# Patient Record
Sex: MALE | Race: ASIAN | NOT HISPANIC OR LATINO | Employment: UNEMPLOYED | URBAN - METROPOLITAN AREA
[De-identification: names, ages, dates, MRNs, and addresses within clinical notes are randomized per-mention and may not be internally consistent; named-entity substitution may affect disease eponyms.]

---

## 2018-08-30 LAB
CREAT SERPL-MCNC: 0.86 MG/DL (ref 0.7–1.3)
GFR SERPL CREATININE-BSD FRML MDRD: >60 ML/MIN
GLUCOSE SERPL-MCNC: 93 MG/DL (ref 74–106)
HBA1C MFR BLD: 6.3 % (ref 0–5.7)
POTASSIUM SERPL-SCNC: 4 MMOL/L (ref 3.5–5.1)

## 2018-09-06 ENCOUNTER — TRANSFERRED RECORDS (OUTPATIENT)
Dept: HEALTH INFORMATION MANAGEMENT | Facility: CLINIC | Age: 63
End: 2018-09-06

## 2019-07-03 ENCOUNTER — OFFICE VISIT (OUTPATIENT)
Dept: FAMILY MEDICINE | Facility: CLINIC | Age: 64
End: 2019-07-03
Payer: COMMERCIAL

## 2019-07-03 VITALS
SYSTOLIC BLOOD PRESSURE: 111 MMHG | TEMPERATURE: 97.3 F | BODY MASS INDEX: 30.37 KG/M2 | WEIGHT: 193.5 LBS | OXYGEN SATURATION: 97 % | HEART RATE: 79 BPM | DIASTOLIC BLOOD PRESSURE: 66 MMHG | HEIGHT: 67 IN

## 2019-07-03 DIAGNOSIS — M25.562 CHRONIC PAIN OF LEFT KNEE: Primary | ICD-10-CM

## 2019-07-03 DIAGNOSIS — I10 HYPERTENSION, UNSPECIFIED TYPE: ICD-10-CM

## 2019-07-03 DIAGNOSIS — E11.9 TYPE 2 DIABETES MELLITUS WITHOUT COMPLICATION, WITHOUT LONG-TERM CURRENT USE OF INSULIN (H): ICD-10-CM

## 2019-07-03 DIAGNOSIS — N40.1 BENIGN PROSTATIC HYPERPLASIA WITH URINARY OBSTRUCTION: ICD-10-CM

## 2019-07-03 DIAGNOSIS — R07.89 ATYPICAL CHEST PAIN: ICD-10-CM

## 2019-07-03 DIAGNOSIS — N13.8 BENIGN PROSTATIC HYPERPLASIA WITH URINARY OBSTRUCTION: ICD-10-CM

## 2019-07-03 DIAGNOSIS — G89.29 CHRONIC PAIN OF LEFT KNEE: Primary | ICD-10-CM

## 2019-07-03 DIAGNOSIS — E78.5 DYSLIPIDEMIA: ICD-10-CM

## 2019-07-03 DIAGNOSIS — Z12.11 SPECIAL SCREENING FOR MALIGNANT NEOPLASMS, COLON: ICD-10-CM

## 2019-07-03 PROBLEM — M25.572 CHRONIC PAIN OF LEFT ANKLE: Status: ACTIVE | Noted: 2019-07-03

## 2019-07-03 LAB
ALBUMIN SERPL-MCNC: 3.6 G/DL (ref 3.4–5)
ALP SERPL-CCNC: 59 U/L (ref 40–150)
ALT SERPL W P-5'-P-CCNC: 30 U/L (ref 0–70)
ANION GAP SERPL CALCULATED.3IONS-SCNC: 8 MMOL/L (ref 3–14)
AST SERPL W P-5'-P-CCNC: 22 U/L (ref 0–45)
BASOPHILS # BLD AUTO: 0 10E9/L (ref 0–0.2)
BASOPHILS NFR BLD AUTO: 0.5 %
BILIRUB SERPL-MCNC: 0.4 MG/DL (ref 0.2–1.3)
BUN SERPL-MCNC: 15 MG/DL (ref 7–30)
CALCIUM SERPL-MCNC: 8.8 MG/DL (ref 8.5–10.1)
CHLORIDE SERPL-SCNC: 108 MMOL/L (ref 94–109)
CHOLEST SERPL-MCNC: 158 MG/DL
CO2 SERPL-SCNC: 24 MMOL/L (ref 20–32)
CREAT SERPL-MCNC: 0.93 MG/DL (ref 0.66–1.25)
DIFFERENTIAL METHOD BLD: NORMAL
EOSINOPHIL # BLD AUTO: 0.2 10E9/L (ref 0–0.7)
EOSINOPHIL NFR BLD AUTO: 3.5 %
ERYTHROCYTE [DISTWIDTH] IN BLOOD BY AUTOMATED COUNT: 13.7 % (ref 10–15)
GFR SERPL CREATININE-BSD FRML MDRD: 87 ML/MIN/{1.73_M2}
GLUCOSE SERPL-MCNC: 116 MG/DL (ref 70–99)
HBA1C MFR BLD: 6.1 % (ref 0–5.6)
HCT VFR BLD AUTO: 40.1 % (ref 40–53)
HDLC SERPL-MCNC: 61 MG/DL
HGB BLD-MCNC: 13.4 G/DL (ref 13.3–17.7)
LDLC SERPL CALC-MCNC: 82 MG/DL
LYMPHOCYTES # BLD AUTO: 2.5 10E9/L (ref 0.8–5.3)
LYMPHOCYTES NFR BLD AUTO: 37.5 %
MCH RBC QN AUTO: 29.1 PG (ref 26.5–33)
MCHC RBC AUTO-ENTMCNC: 33.4 G/DL (ref 31.5–36.5)
MCV RBC AUTO: 87 FL (ref 78–100)
MONOCYTES # BLD AUTO: 0.6 10E9/L (ref 0–1.3)
MONOCYTES NFR BLD AUTO: 9.3 %
NEUTROPHILS # BLD AUTO: 3.3 10E9/L (ref 1.6–8.3)
NEUTROPHILS NFR BLD AUTO: 49.2 %
NONHDLC SERPL-MCNC: 97 MG/DL
PLATELET # BLD AUTO: 196 10E9/L (ref 150–450)
POTASSIUM SERPL-SCNC: 3.7 MMOL/L (ref 3.4–5.3)
PROT SERPL-MCNC: 7.3 G/DL (ref 6.8–8.8)
RBC # BLD AUTO: 4.6 10E12/L (ref 4.4–5.9)
SODIUM SERPL-SCNC: 140 MMOL/L (ref 133–144)
TRIGL SERPL-MCNC: 75 MG/DL
WBC # BLD AUTO: 6.6 10E9/L (ref 4–11)

## 2019-07-03 PROCEDURE — 99386 PREV VISIT NEW AGE 40-64: CPT | Performed by: INTERNAL MEDICINE

## 2019-07-03 PROCEDURE — 80053 COMPREHEN METABOLIC PANEL: CPT | Performed by: INTERNAL MEDICINE

## 2019-07-03 PROCEDURE — 85025 COMPLETE CBC W/AUTO DIFF WBC: CPT | Performed by: INTERNAL MEDICINE

## 2019-07-03 PROCEDURE — 83036 HEMOGLOBIN GLYCOSYLATED A1C: CPT | Performed by: INTERNAL MEDICINE

## 2019-07-03 PROCEDURE — 84154 ASSAY OF PSA FREE: CPT | Mod: 90 | Performed by: INTERNAL MEDICINE

## 2019-07-03 PROCEDURE — 93000 ELECTROCARDIOGRAM COMPLETE: CPT | Performed by: INTERNAL MEDICINE

## 2019-07-03 PROCEDURE — 99214 OFFICE O/P EST MOD 30 MIN: CPT | Mod: 25 | Performed by: INTERNAL MEDICINE

## 2019-07-03 PROCEDURE — 80061 LIPID PANEL: CPT | Performed by: INTERNAL MEDICINE

## 2019-07-03 PROCEDURE — 84153 ASSAY OF PSA TOTAL: CPT | Mod: 90 | Performed by: INTERNAL MEDICINE

## 2019-07-03 PROCEDURE — 36415 COLL VENOUS BLD VENIPUNCTURE: CPT | Performed by: INTERNAL MEDICINE

## 2019-07-03 PROCEDURE — 99000 SPECIMEN HANDLING OFFICE-LAB: CPT | Performed by: INTERNAL MEDICINE

## 2019-07-03 RX ORDER — GLIMEPIRIDE 1 MG/1
1 TABLET ORAL
COMMUNITY
End: 2021-04-23

## 2019-07-03 RX ORDER — ROSUVASTATIN CALCIUM 20 MG/1
20 TABLET, COATED ORAL DAILY
Qty: 90 TABLET | Refills: 0 | Status: SHIPPED | OUTPATIENT
Start: 2019-07-03 | End: 2019-07-05

## 2019-07-03 RX ORDER — LISINOPRIL 10 MG/1
10 TABLET ORAL DAILY
COMMUNITY
End: 2019-08-30

## 2019-07-03 RX ORDER — TAMSULOSIN HYDROCHLORIDE 0.4 MG/1
0.4 CAPSULE ORAL
COMMUNITY
End: 2021-04-23

## 2019-07-03 SDOH — HEALTH STABILITY: MENTAL HEALTH: HOW OFTEN DO YOU HAVE A DRINK CONTAINING ALCOHOL?: 2-3 TIMES A WEEK

## 2019-07-03 ASSESSMENT — MIFFLIN-ST. JEOR: SCORE: 1631.34

## 2019-07-03 NOTE — PROGRESS NOTES
SUBJECTIVE:   CC: Jordan Miller is an 64 year old male who presents for preventative health visit.     Healthy Habits:    Getting at least 3 servings of Calcium per day:  Yes    Bi-annual eye exam:  Yes    Dental care twice a year:  Yes    Sleep apnea or symptoms of sleep apnea:  None    Diet:  Regular (no restrictions)    Frequency of exercise:  6-7 days/week    Duration of exercise:  Greater than 60 minutes    Taking medications regularly:  Yes    Barriers to taking medications:  None    Medication side effects:  None    PHQ-2 Total Score:    Additional concerns today:  Yes    Patient presenting for a physical and discuss chronic multiple medical problems.  Patient is  physically very active he runs 20 min and swims 6 times for 30 minutes a week.  He describes occasional chest pains upper chest area  between the left upper and right upper chest last for a few minutes at a time can happen with exercise or rest.  He is known to have diabetes seems to be well controlled.  He had an eye exam last seen in 2018.  Which he also complains of some minor knee pain left more than right and left ankle pain  is chronic and intermittent as well he had a colonoscopy 9 years ago in, [place] denies any GI symptoms he is due for repeat.  He has weak void stream, he has been  taking Flomax he still has obstructive lower urinary tract symptoms; intermittency and hesitancy.  He wears glasses ; bifocals.  Denies any sinus issues or allergies.  Denies any shortness of breath.  Denies any back pain or other complaints.  He had a Td vaccine 2 Months Ago he stepped on a nail in [place] at 89,.  His dad is alive 100 years of age and his mom  of old . he has 3 sisters and 3 brothers one sister has diabetes and 2 brothers have diabetes and he was  being seen in [facility] he      Today's PHQ-2 Score:   PHQ-2 (  [employer]) 7/3/2019   Q1: Little interest or pleasure in doing things 0   Q2:  Feeling down, depressed or hopeless 0   PHQ-2 Score 0       Abuse: Current or Past(Physical, Sexual or Emotional)- No  Do you feel safe in your environment? Yes    Social History     Tobacco Use     Smoking status: Never Smoker     Smokeless tobacco: Never Used   Substance Use Topics     Alcohol use: Yes     Frequency: 2-3 times a week     Comment: 2-3 drinks a week hard liquor     If you drink alcohol do you typically have >3 drinks per day or >7 drinks per week? No    Alcohol Use 7/3/2019   Prescreen: >3 drinks/day or >7 drinks/week? No     Last PSA: No results found for: PSA    Reviewed orders with patient. Reviewed health maintenance and updated orders accordingly - Yes  Lab work is in process  Labs reviewed in EPIC  BP Readings from Last 3 Encounters:   07/03/19 111/66   07/03/13 140/70    Wt Readings from Last 3 Encounters:   07/03/19 87.8 kg (193 lb 8 oz)   07/03/13 90.3 kg (199 lb)                  Patient Active Problem List   Diagnosis     Chronic pain of left ankle     Left knee pain     Atypical chest pain     Type 2 diabetes mellitus without complication, without long-term current use of insulin (H)     Hypertension, unspecified type     Benign prostatic hyperplasia with urinary obstruction     Dyslipidemia     Past Surgical History:   Procedure Laterality Date     CHOLECYSTECTOMY      1994     HERNIA REPAIR, UMBILICAL      ?2013       Social History     Tobacco Use     Smoking status: Never Smoker     Smokeless tobacco: Never Used   Substance Use Topics     Alcohol use: Yes     Frequency: 2-3 times a week     Comment: 2-3 drinks a week hard liquor     History reviewed. No pertinent family history.      Current Outpatient Medications   Medication Sig Dispense Refill     ASPIRIN 81 PO        atenolol (TENORMIN) 25 MG tablet Take 25 mg by mouth daily.       Cholecalciferol (VITAMIN D PO) Take 5,000 Units by mouth       glimepiride (AMARYL) 1 MG tablet Take 1 mg by mouth every morning (before breakfast)        lisinopril (PRINIVIL/ZESTRIL) 10 MG tablet Take 10 mg by mouth daily       metFORMIN (GLUCOPHAGE) 1000 MG tablet Take 1,000 mg by mouth 2 times daily (with meals).       rosuvastatin (CRESTOR) 20 MG tablet Take 1 tablet (20 mg) by mouth daily 90 tablet 0     tamsulosin (FLOMAX) 0.4 MG capsule Take 0.4 mg by mouth daily       Allergies   Allergen Reactions     No Known Allergies      Recent Labs   Lab Test 07/03/19  0938   A1C 6.1*   LDL 82   HDL 61   TRIG 75   ALT 30   CR 0.93   GFRESTIMATED 87   GFRESTBLACK >90   POTASSIUM 3.7        Reviewed and updated as needed this visit by clinical staff  Tobacco  Allergies  Meds  Problems  Med Hx  Surg Hx  Fam Hx  Soc Hx          Reviewed and updated as needed this visit by Provider  Tobacco  Allergies  Meds  Problems  Med Hx  Surg Hx  Fam Hx  Soc Hx         History reviewed. No pertinent past medical history.   Past Surgical History:   Procedure Laterality Date     CHOLECYSTECTOMY      1994     HERNIA REPAIR, UMBILICAL      ?2013       Review of Systems  CONSTITUTIONAL: NEGATIVE for fever, chills, change in weight  INTEGUMENTARY/SKIN: NEGATIVE for worrisome rashes, moles or lesions he has a lesion on his left upper chest that he keeps scratching  EYES: NEGATIVE for vision changes or irritation.  Wears bifocals  ENT: NEGATIVE for ear, mouth and throat problems  RESP: NEGATIVE for significant cough or SOB  CV: NEGATIVE for palpitations or peripheral edema; positive for atypical chest symptoms chest pain left upper and right upper chest not related to exertion  GI: NEGATIVE for nausea, abdominal pain, heartburn, or change in bowel habits   male: negative for dysuria, hematuria, ; positive he is maintained on erectile dysfunction, urethral discharge Flomax for obstructive lower urinary tract symptoms including hesitancy and intermittency he has a history of BPH  MUSCULOSKELETAL: NEGATIVE for significant arthralgias or myalgia except for knee pain and left  "ankle pain chronic  NEURO: NEGATIVE for weakness, dizziness or paresthesias  ENDOCRINE: NEGATIVE for temperature intolerance, skin/hair changes  HEME/ALLERGY/IMMUNE: NEGATIVE for bleeding problems  PSYCHIATRIC: NEGATIVE for changes in mood or affect    OBJECTIVE:   /66   Pulse 79   Temp 97.3  F (36.3  C) (Oral)   Ht 1.702 m (5' 7\")   Wt 87.8 kg (193 lb 8 oz)   SpO2 97%   BMI 30.31 kg/m      Physical Exam  GENERAL: healthy, alert and no distress  EYES: Eyes grossly normal to inspection, PERRL and conjunctivae and sclerae normal  HENT: ear canals and TM's normal, nose and mouth without ulcers or lesions  NECK: no adenopathy, no asymmetry, masses, or scars and thyroid normal to palpation  RESP: lungs clear to auscultation - no rales, rhonchi or wheezes  CV: regular rate and rhythm, normal S1 S2, no S3 or S4, no murmur, click or rub, no peripheral edema and peripheral pulses strong  ABDOMEN: soft, nontender, no hepatosplenomegaly, no masses and bowel sounds normal  MS: no gross musculoskeletal defects noted, no edema.  He has crepitus with full flexion extension bilateral knees left more than right some arthritic knee deformities, no joint line tenderness.  Full range of motion of the left ankle  SKIN: no suspicious lesions or rashes, red macule nonscaly none elicited no other symptoms on the left upper chest and pleasant  NEURO: Normal strength and tone, mentation intact and speech normal  PSYCH: mentation appears normal, affect normal/bright    Diagnostic Test Results:  Labs reviewed in Epic    ASSESSMENT/PLAN:   Jordan was seen today for physical.    Diagnoses and all orders for this visit:    Chronic pain of left knee  -     ORTHO  REFERRAL    Atypical chest pain  -     CBC with platelets and differential  -     Hemoglobin A1c    Type 2 diabetes mellitus without complication, without long-term current use of insulin (H)  -     Lipid panel reflex to direct LDL Fasting  -     Comprehensive " "metabolic panel  -     CBC with platelets and differential  -     DIABETES EDUCATOR REFERRAL    Hypertension, unspecified type  -     CARDIOLOGY EVAL ADULT REFERRAL  -     EKG 12-lead complete w/read - Clinics  -     Comprehensive metabolic panel    Benign prostatic hyperplasia with urinary obstruction  -     PSA, total and free  -     UROLOGY ADULT REFERRAL    Dyslipidemia  -     rosuvastatin (CRESTOR) 20 MG tablet; Take 1 tablet (20 mg) by mouth daily    Special screening for malignant neoplasms, colon  -     GASTROENTEROLOGY ADULT REF CONSULT ONLY        COUNSELING:   Reviewed preventive health counseling, as reflected in patient instructions       Regular exercise       Healthy diet/nutrition       Colon cancer screening       Osteoporosis Prevention/Bone Health    Estimated body mass index is 30.31 kg/m  as calculated from the following:    Height as of this encounter: 1.702 m (5' 7\").    Weight as of this encounter: 87.8 kg (193 lb 8 oz).     Weight management plan: Discussed healthy diet and exercise guidelines     reports that he has never smoked. He has never used smokeless tobacco.    Time spent face-to-face was 45 minutes with more than 50% counseling, review of records and addressing multiple health problems as listed in Assessment Plan in addition to physical.  Counseling Resources:  ATP IV Guidelines  Pooled Cohorts Equation Calculator  FRAX Risk Assessment  ICSI Preventive Guidelines  Dietary Guidelines for Americans, 2010  USDA's MyPlate  ASA Prophylaxis  Lung CA Screening    Kristi Wise MD  [facility]  "

## 2019-07-04 PROBLEM — N13.8 BENIGN PROSTATIC HYPERPLASIA WITH URINARY OBSTRUCTION: Status: ACTIVE | Noted: 2019-07-04

## 2019-07-04 PROBLEM — I10 HYPERTENSION, UNSPECIFIED TYPE: Status: ACTIVE | Noted: 2019-07-04

## 2019-07-04 PROBLEM — N40.1 BENIGN PROSTATIC HYPERPLASIA WITH URINARY OBSTRUCTION: Status: ACTIVE | Noted: 2019-07-04

## 2019-07-04 PROBLEM — E78.5 DYSLIPIDEMIA: Status: ACTIVE | Noted: 2019-07-04

## 2019-07-04 PROBLEM — R07.89 ATYPICAL CHEST PAIN: Status: ACTIVE | Noted: 2019-07-04

## 2019-07-04 LAB
PSA FREE MFR SERPL: 29 %
PSA FREE SERPL-MCNC: 0.2 NG/ML
PSA SERPL-MCNC: 0.7 NG/ML (ref 0–4)

## 2019-07-05 DIAGNOSIS — E78.5 DYSLIPIDEMIA: ICD-10-CM

## 2019-07-05 RX ORDER — ROSUVASTATIN CALCIUM 10 MG/1
10 TABLET, COATED ORAL DAILY
Qty: 90 TABLET | Refills: 3 | Status: SHIPPED | OUTPATIENT
Start: 2019-07-05 | End: 2019-08-30

## 2019-07-05 NOTE — RESULT ENCOUNTER NOTE
Labs reviewed please advise advised that PSA level is within normal range.  Advise HbA1c is 6.1 keep on same medications of metformin and glimepiride no change of dose..  Advise to exercise brisk walking 30 minutes 5 times a week, low-fat low carbs diet.  his lipid panel looks fine HDL is 61 which is athero-protective to the heart advise to take only half tablet of the Crestor 20 mg dose that is only 10 mg once daily [half of the 20 mg dose]  Kidney electrolytes liver enzymes and CBC are all normal, please reassure; normal white count, normal hemoglobin and hematocrit; that is no anemia and normal platelet count.  Any further questions I am happy to address.  Thank you for follow-up

## 2019-07-05 NOTE — LETTER
[facility]  [address] [facility]  [address]  [place]  [address]  Tel: [phone]    July 5, 2019    Jordan Paul  [address] [facility] [address]        Dear Mesfin Miller,    The results of your recent labs were within normal limits.     Labs reviewed:  PSA level is within normal range. HbA1c is 6.1 keep on same medications of metformin and glimepiride and no change of dose. Exercise brisk walking 30 minutes 5 times a week, low-fat low carbs diet.     Lipid panel looks fine HDL is 61 which is athero-protective to the heart advise to take only half tablet of the Crestor 20 mg dose that is only 10 mg once daily [half of the 20 mg dose]- This has been sent to your pharmacy ([facility]).     Kidney electrolytes liver enzymes and CBC are all normal. Normal white count, normal hemoglobin and hematocrit; that is no anemia and normal platelet count.     Any further questions I am happy to address.  Thank you for follow-up    If you have any further questions or problems, please contact our office.      Sincerely,    Kristi Wise MD/Ariana BUCK RN            Enclosure: Lab Results    Results for orders placed or performed in visit on 07/03/19   Lipid panel reflex to direct LDL Fasting   Result Value Ref Range    Cholesterol 158 <200 mg/dL    Triglycerides 75 <150 mg/dL    HDL Cholesterol 61 >39 mg/dL    LDL Cholesterol Calculated 82 <100 mg/dL    Non HDL Cholesterol 97 <130 mg/dL   Comprehensive metabolic panel   Result Value Ref Range    Sodium 140 133 - 144 mmol/L    Potassium 3.7 3.4 - 5.3 mmol/L    Chloride 108 94 - 109 mmol/L    Carbon Dioxide 24 20 - 32 mmol/L    Anion Gap 8 3 - 14 mmol/L    Glucose 116 (H) 70 - 99 mg/dL    Urea Nitrogen 15 7 - 30 mg/dL    Creatinine 0.93 0.66 - 1.25 mg/dL    GFR Estimate 87 >60 mL/min/[1.73_m2]    GFR Estimate If Black >90 >60 mL/min/[1.73_m2]    Calcium 8.8 8.5 - 10.1 mg/dL    Bilirubin Total 0.4 0.2 - 1.3 mg/dL    Albumin 3.6 3.4 - 5.0 g/dL    Protein  Total 7.3 6.8 - 8.8 g/dL    Alkaline Phosphatase 59 40 - 150 U/L    ALT 30 0 - 70 U/L    AST 22 0 - 45 U/L   CBC with platelets and differential   Result Value Ref Range    WBC 6.6 4.0 - 11.0 10e9/L    RBC Count 4.60 4.4 - 5.9 10e12/L    Hemoglobin 13.4 13.3 - 17.7 g/dL    Hematocrit 40.1 40.0 - 53.0 %    MCV 87 78 - 100 fl    MCH 29.1 26.5 - 33.0 pg    MCHC 33.4 31.5 - 36.5 g/dL    RDW 13.7 10.0 - 15.0 %    Platelet Count 196 150 - 450 10e9/L    % Neutrophils 49.2 %    % Lymphocytes 37.5 %    % Monocytes 9.3 %    % Eosinophils 3.5 %    % Basophils 0.5 %    Absolute Neutrophil 3.3 1.6 - 8.3 10e9/L    Absolute Lymphocytes 2.5 0.8 - 5.3 10e9/L    Absolute Monocytes 0.6 0.0 - 1.3 10e9/L    Absolute Eosinophils 0.2 0.0 - 0.7 10e9/L    Absolute Basophils 0.0 0.0 - 0.2 10e9/L    Diff Method Automated Method    PSA, total and free   Result Value Ref Range    PSA Free 0.2 ng/mL    Prostate Specific Antigen 0.7 0.0 - 4.0 ng/mL    PSAPCT 29 %   Hemoglobin A1c   Result Value Ref Range    Hemoglobin A1C 6.1 (H) 0 - 5.6 %

## 2019-07-05 NOTE — TELEPHONE ENCOUNTER
Called patient: No answer, left VM to return call to clinic    To PCP:   Spoke with pharmacy- pt has not picked up rosuvastatin 20mg tabs. Can we send new RX for 10mg? Pended.     Sent Letter to Patient as pharmacy will advise that dose reduction has occurred (note on RX to explain to pt on )     Notes recorded by Kristi Wise MD on 7/4/2019 at 10:15 PM CDT  Labs reviewed please advise advised that PSA level is within normal range.  Advise HbA1c is 6.1 keep on same medications of metformin and glimepiride no change of dose..  Advise to exercise brisk walking 30 minutes 5 times a week, low-fat low carbs diet.  his lipid panel looks fine HDL is 61 which is athero-protective to the heart advise to take only half tablet of the Crestor 20 mg dose that is only 10 mg once daily [half of the 20 mg dose]  Kidney electrolytes liver enzymes and CBC are all normal, please reassure; normal white count, normal hemoglobin and hematocrit; that is no anemia and normal platelet count.  Any further questions I am happy to address.  Thank you for follow-up

## 2019-07-05 NOTE — TELEPHONE ENCOUNTER
Spoke to patient and he is out of town of town until Monday; agrees/understands to begin 10 mg daily Crestor.    To PCP to sign.  Thank you,  Shelli Schwartz RN

## 2019-07-08 ENCOUNTER — TELEPHONE (OUTPATIENT)
Dept: FAMILY MEDICINE | Facility: CLINIC | Age: 64
End: 2019-07-08

## 2019-07-08 NOTE — TELEPHONE ENCOUNTER
Diabetes Education Scheduling Outreach #1:    Call to patient to schedule. Patient declined to schedule. Patient stated that he knows everything and doesn't want to waste his time or ours.    Emily Carter OnCall  Diabetes and Nutrition Scheduling

## 2019-07-08 NOTE — PROGRESS NOTES
[facility]   Clinic Visit s Jul 15, 2019    PCP: Kristi Wise [place]bernard      Jordan is a 64 year old male who is seen as self referral for   Chief Complaint   Patient presents with     Left Knee - Pain     Right Knee - Pain     Ankle Pain       Injury: Reports insidious onset without acute precipitating event.     Location of Pain: bilateral knee anterior , nonradiating   Duration of Pain: 1 month(s)  Rating of Pain at worst: 6/10  Rating of Pain Currently: 1/10  Pain is better with: activity avoidance   Pain is worse with: stair climbing  Treatment so far consists of: no treatment tried to date  Associated symptoms: no distal numbness or tingling; denies swelling or warmth  Recent imaging completed: No recent imaging completed.  Prior History of related problems: none    The also complains of diffuse left ankle pain. He denies any injury or mechanism to problem. He has not tried any treatment. He notices pain worsen with prolonged walking and better with.     Social History: retired       Patient Active Problem List    Diagnosis Date Noted     Atypical chest pain 07/04/2019     Priority: Medium     Type 2 diabetes mellitus without complication, without long-term current use of insulin (H) 07/04/2019     Priority: Medium     Hypertension, unspecified type 07/04/2019     Priority: Medium     Benign prostatic hyperplasia with urinary obstruction 07/04/2019     Priority: Medium     Dyslipidemia 07/04/2019     Priority: Medium     Chronic pain of left ankle 07/03/2019     Priority: Medium     Left knee pain 07/03/2019     Priority: Medium     FMHX denies sig illnesses.      Social History     Socioeconomic History     Marital status:      Spouse name: Not on file     Number of children: Not on file     Years of education: Not on file     Highest education level: Not on file   Occupational History     Not on file   Social Needs     Financial resource strain: Not on file     Food  "insecurity:     Worry: Not on file     Inability: Not on file     Transportation needs:     Medical: Not on file     Non-medical: Not on file   Tobacco Use     Smoking status: Never Smoker     Smokeless tobacco: Never Used   Substance and Sexual Activity     Alcohol use: Yes     Frequency: 2-3 times a week     Comment: 2-3 drinks a week hard liquor       Past Surgical History:   Procedure Laterality Date     CHOLECYSTECTOMY      1994     HERNIA REPAIR, UMBILICAL      ?2013           Review of Systems   Musculoskeletal: Positive for joint pain.   All other systems reviewed and are negative.        Physical Exam   Musculoskeletal:        Right knee: He exhibits no effusion.        Left knee: He exhibits no effusion.     /62   Ht 1.702 m (5' 7\")   Wt 87.5 kg (193 lb)   BMI 30.23 kg/m    Constitutional:well-developed, well-nourished, and in no distress.   Cardiovascular: Intact distal pulses.    Neurological: alert. Gait Normal:   Gait, station, stance, and balance appear normal for age  Skin: Skin is warm and dry.   Psychiatric: Mood and affect normal.   Respiratory: unlabored, speaks in full sentences  Lymph: no LAD, no lymphangitis          Left Ankle Exam     Tenderness   Left ankle tenderness location: Distal calcaneal region at Achilles insertion.     Range of Motion   Dorsiflexion: 10   Plantar flexion: normal   Eversion: normal   Inversion: normal     Muscle Strength   Dorsiflexion:  5/5   Plantar flexion:  5/5   Anterior tibial:  5/5   Posterior tibial:  5/5  Gastrocsoleus:  5/5  Peroneal muscle:  5/5    Other   Erythema: absent  Scars: absent  Sensation: normal  Pulse: present    Comments:  Achilles intact      Right Knee Exam     Tenderness   The patient is experiencing tenderness in the medial joint line.    Range of Motion   Extension: normal   Flexion: normal     Tests   Gary:  Medial - negative Lateral - negative  Varus: negative Valgus: negative  Drawer:  Anterior - negative    Posterior - " negative  Patellar apprehension: negative    Other   Erythema: absent  Scars: absent  Sensation: normal  Pulse: present  Swelling: none  Effusion: no effusion present    Comments:  Crepitus with range of motion      Left Knee Exam     Tenderness   The patient is experiencing tenderness in the medial joint line.    Range of Motion   Extension: normal   Flexion: normal     Tests   Gary:  Medial - negative Lateral - negative  Varus: negative Valgus: negative  Drawer:  Anterior - negative     Posterior - negative  Patellar apprehension: negative    Other   Erythema: absent  Scars: absent  Sensation: normal  Pulse: present  Swelling: none  Effusion: no effusion present    Comments:  Crepitus with range of motion               X-ray images Ordered and independently reviewed by me in the office today with the patient. X-ray shows:     Recent Results (from the past 744 hour(s))   XR Knee Bilateral 3 Views    Narrative    7/15/2019    Degenerative changes of the bilateral knees are noted.  There is tibial   spine spurring bilaterally, mild medial compartment narrowing bilaterally,   marked patellofemoral joint space narrowing and lateral patellar   osteophyte formation and corresponding femoral bony overgrowth.  Traction   spurs noted at the superior aspect of the patella at the region of the   quadriceps tendon insertion bilaterally as well.   XR Ankle Left G/E 3 Views    Narrative    7/15/2019    Mild diffuse joint space narrowing of the mortise, no unusual widening.    Posterior calcaneal and plantar calcaneal spurring noted.  No acute   fractures.       ASSESSMENT/PLAN    ICD-10-CM    1. Chronic pain of left ankle M25.572 XR Ankle Left G/E 3 Views    G89.29    2. Chronic pain of both knees M25.561 XR Knee Bilateral 3 Views    M25.562     G89.29    3. Primary osteoarthritis of both knees M17.0    4. Achilles tendinosis of left lower extremity M76.62 order for DME     Reviewed nature of degenerative arthritis,  discussed options including joint protection strategies and symptom management, including NSAIDS,  acetaminophen on a scheduled and/or as needed basis, joint injection with cortisone or hyaluronic acid derivatives, bracing, glucosamine, maintenance of overall knee stability through strengthening through physical therapy.  Pt opts for  symptom treatment and activity modification/rest    Insertional Achilles tendinopathy, will treat with gentle stretching and use of a heel lift for daily use.  Physical therapy is an option if desired though he declined today.

## 2019-07-15 ENCOUNTER — ANCILLARY PROCEDURE (OUTPATIENT)
Dept: GENERAL RADIOLOGY | Facility: CLINIC | Age: 64
End: 2019-07-15
Attending: FAMILY MEDICINE
Payer: COMMERCIAL

## 2019-07-15 ENCOUNTER — OFFICE VISIT (OUTPATIENT)
Dept: ORTHOPEDICS | Facility: CLINIC | Age: 64
End: 2019-07-15
Payer: COMMERCIAL

## 2019-07-15 VITALS
WEIGHT: 193 LBS | BODY MASS INDEX: 30.29 KG/M2 | DIASTOLIC BLOOD PRESSURE: 62 MMHG | SYSTOLIC BLOOD PRESSURE: 105 MMHG | HEIGHT: 67 IN

## 2019-07-15 DIAGNOSIS — M25.572 CHRONIC PAIN OF LEFT ANKLE: Primary | ICD-10-CM

## 2019-07-15 DIAGNOSIS — M25.561 CHRONIC PAIN OF BOTH KNEES: ICD-10-CM

## 2019-07-15 DIAGNOSIS — G89.29 CHRONIC PAIN OF BOTH KNEES: ICD-10-CM

## 2019-07-15 DIAGNOSIS — M17.0 PRIMARY OSTEOARTHRITIS OF BOTH KNEES: ICD-10-CM

## 2019-07-15 DIAGNOSIS — G89.29 CHRONIC PAIN OF LEFT ANKLE: ICD-10-CM

## 2019-07-15 DIAGNOSIS — G89.29 CHRONIC PAIN OF LEFT ANKLE: Primary | ICD-10-CM

## 2019-07-15 DIAGNOSIS — M25.562 CHRONIC PAIN OF BOTH KNEES: ICD-10-CM

## 2019-07-15 DIAGNOSIS — M67.88 ACHILLES TENDINOSIS OF LEFT LOWER EXTREMITY: ICD-10-CM

## 2019-07-15 DIAGNOSIS — M25.572 CHRONIC PAIN OF LEFT ANKLE: ICD-10-CM

## 2019-07-15 PROCEDURE — 73610 X-RAY EXAM OF ANKLE: CPT | Mod: LT | Performed by: FAMILY MEDICINE

## 2019-07-15 PROCEDURE — 99204 OFFICE O/P NEW MOD 45 MIN: CPT | Performed by: FAMILY MEDICINE

## 2019-07-15 PROCEDURE — 73562 X-RAY EXAM OF KNEE 3: CPT | Mod: RT | Performed by: FAMILY MEDICINE

## 2019-07-15 ASSESSMENT — MIFFLIN-ST. JEOR: SCORE: 1629.07

## 2019-07-15 NOTE — LETTER
7/15/2019         RE: Jordan Miller  [address] [facility] [place], [place] [address]        Dear Colleague,    Thank you for referring your patient, Jordan Miller, to the  SPORTS MEDICINE. Please see a copy of my visit note below.    [facility]   Clinic Visit s Jul 15, 2019    PCP: Kristi Wise      Jordan is a 64 year old male who is seen as self referral for   Chief Complaint   Patient presents with     Left Knee - Pain     Right Knee - Pain     Ankle Pain       Injury: Reports insidious onset without acute precipitating event.     Location of Pain: bilateral knee anterior , nonradiating   Duration of Pain: 1 month(s)  Rating of Pain at worst: 6/10  Rating of Pain Currently: 1/10  Pain is better with: activity avoidance   Pain is worse with: stair climbing  Treatment so far consists of: no treatment tried to date  Associated symptoms: no distal numbness or tingling; denies swelling or warmth  Recent imaging completed: No recent imaging completed.  Prior History of related problems: none    The also complains of diffuse left ankle pain. He denies any injury or mechanism to problem. He has not tried any treatment. He notices pain worsen with prolonged walking and better with.     Social History: retired       Patient Active Problem List    Diagnosis Date Noted     Atypical chest pain 07/04/2019     Priority: Medium     Type 2 diabetes mellitus without complication, without long-term current use of insulin (H) 07/04/2019     Priority: Medium     Hypertension, unspecified type 07/04/2019     Priority: Medium     Benign prostatic hyperplasia with urinary obstruction 07/04/2019     Priority: Medium     Dyslipidemia 07/04/2019     Priority: Medium     Chronic pain of left ankle 07/03/2019     Priority: Medium     Left knee pain 07/03/2019     Priority: Medium     FMHX denies sig illnesses.      Social History     Socioeconomic History     Marital status:      Spouse name: Not  "on file     Number of children: Not on file     Years of education: Not on file     Highest education level: Not on file   Occupational History     Not on file   Social Needs     Financial resource strain: Not on file     Food insecurity:     Worry: Not on file     Inability: Not on file     Transportation needs:     Medical: Not on file     Non-medical: Not on file   Tobacco Use     Smoking status: Never Smoker     Smokeless tobacco: Never Used   Substance and Sexual Activity     Alcohol use: Yes     Frequency: 2-3 times a week     Comment: 2-3 drinks a week hard liquor       Past Surgical History:   Procedure Laterality Date     CHOLECYSTECTOMY      1994     HERNIA REPAIR, UMBILICAL      ?2013           Review of Systems   Musculoskeletal: Positive for joint pain.   All other systems reviewed and are negative.        Physical Exam   Musculoskeletal:        Right knee: He exhibits no effusion.        Left knee: He exhibits no effusion.     /62   Ht 1.702 m (5' 7\")   Wt 87.5 kg (193 lb)   BMI 30.23 kg/m     Constitutional:well-developed, well-nourished, and in no distress.   Cardiovascular: Intact distal pulses.    Neurological: alert. Gait Normal:   Gait, station, stance, and balance appear normal for age  Skin: Skin is warm and dry.   Psychiatric: Mood and affect normal.   Respiratory: unlabored, speaks in full sentences  Lymph: no LAD, no lymphangitis          Left Ankle Exam     Tenderness   Left ankle tenderness location: Distal calcaneal region at Achilles insertion.     Range of Motion   Dorsiflexion: 10   Plantar flexion: normal   Eversion: normal   Inversion: normal     Muscle Strength   Dorsiflexion:  5/5   Plantar flexion:  5/5   Anterior tibial:  5/5   Posterior tibial:  5/5  Gastrocsoleus:  5/5  Peroneal muscle:  5/5    Other   Erythema: absent  Scars: absent  Sensation: normal  Pulse: present    Comments:  Achilles intact      Right Knee Exam     Tenderness   The patient is experiencing " tenderness in the medial joint line.    Range of Motion   Extension: normal   Flexion: normal     Tests   Gary:  Medial - negative Lateral - negative  Varus: negative Valgus: negative  Drawer:  Anterior - negative    Posterior - negative  Patellar apprehension: negative    Other   Erythema: absent  Scars: absent  Sensation: normal  Pulse: present  Swelling: none  Effusion: no effusion present    Comments:  Crepitus with range of motion      Left Knee Exam     Tenderness   The patient is experiencing tenderness in the medial joint line.    Range of Motion   Extension: normal   Flexion: normal     Tests   Gary:  Medial - negative Lateral - negative  Varus: negative Valgus: negative  Drawer:  Anterior - negative     Posterior - negative  Patellar apprehension: negative    Other   Erythema: absent  Scars: absent  Sensation: normal  Pulse: present  Swelling: none  Effusion: no effusion present    Comments:  Crepitus with range of motion               X-ray images Ordered and independently reviewed by me in the office today with the patient. X-ray shows:     Recent Results (from the past 744 hour(s))   XR Knee Bilateral 3 Views    Narrative    7/15/2019    Degenerative changes of the bilateral knees are noted.  There is tibial   spine spurring bilaterally, mild medial compartment narrowing bilaterally,   marked patellofemoral joint space narrowing and lateral patellar   osteophyte formation and corresponding femoral bony overgrowth.  Traction   spurs noted at the superior aspect of the patella at the region of the   quadriceps tendon insertion bilaterally as well.   XR Ankle Left G/E 3 Views    Narrative    7/15/2019    Mild diffuse joint space narrowing of the mortise, no unusual widening.    Posterior calcaneal and plantar calcaneal spurring noted.  No acute   fractures.       ASSESSMENT/PLAN    ICD-10-CM    1. Chronic pain of left ankle M25.572 XR Ankle Left G/E 3 Views    G89.29    2. Chronic pain of both  knees M25.561 XR Knee Bilateral 3 Views    M25.562     G89.29    3. Primary osteoarthritis of both knees M17.0    4. Achilles tendinosis of left lower extremity M76.62 order for DME     Reviewed nature of degenerative arthritis, discussed options including joint protection strategies and symptom management, including NSAIDS,  acetaminophen on a scheduled and/or as needed basis, joint injection with cortisone or hyaluronic acid derivatives, bracing, glucosamine, maintenance of overall knee stability through strengthening through physical therapy.  Pt opts for  symptom treatment and activity modification/rest    Insertional Achilles tendinopathy, will treat with gentle stretching and use of a heel lift for daily use.  Physical therapy is an option if desired though he declined today.      Again, thank you for allowing me to participate in the care of your patient.        Sincerely,        Masood Benitez MD

## 2019-07-26 ENCOUNTER — TRANSFERRED RECORDS (OUTPATIENT)
Dept: HEALTH INFORMATION MANAGEMENT | Facility: CLINIC | Age: 64
End: 2019-07-26

## 2019-07-31 ENCOUNTER — TELEPHONE (OUTPATIENT)
Dept: FAMILY MEDICINE | Facility: CLINIC | Age: 64
End: 2019-07-31

## 2019-07-31 NOTE — TELEPHONE ENCOUNTER
Reason for Call:  Form, our goal is to have forms completed with 72 hours, however, some forms may require a visit or additional information.    Type of letter, form or note:  DMV    Who is the form from?: Patient    Where did the form come from: Patient or family brought in       What clinic location was the form placed at?: [facility]    Where the form was placed: physicians folder Box/Folder    What number is listed as a contact on the form?: [phone]       Additional comments: please call patient to let him know when this has been sent    Call taken on 7/31/2019 at 10:39 AM by Rehana Segura

## 2019-08-06 ENCOUNTER — TELEPHONE (OUTPATIENT)
Dept: FAMILY MEDICINE | Facility: CLINIC | Age: 64
End: 2019-08-06

## 2019-08-06 NOTE — TELEPHONE ENCOUNTER
We received a document from Disability Parking Certificate.  Please fill out the requested form, sign and place in the braulio bin.     Miranda Kern MA

## 2019-08-06 NOTE — TELEPHONE ENCOUNTER
Reason for Call:  Form, our goal is to have forms completed with 72 hours, however, some forms may require a visit or additional information.    Type of letter, form or note:  disability  Parking form    Who is the form from?: Patient    Where did the form come from: Patient or family brought in       What clinic location was the form placed at?: [facility]    Where the form was placed: [employer] Forms bin    What number is listed as a contact on the form?: [phone]       Additional comments: Please call the patient to come and  the form   [phone] (home)       Call taken on 8/6/2019 at 9:48 AM by Shruthi Moran

## 2019-08-06 NOTE — TELEPHONE ENCOUNTER
"From my assessment of patient he does not qualify for disability parking ,     As per my notes \"Patient is  physically very active he runs 20 min and swims 6 times for 30 minutes a week.\"    He will need to get the Form filled by ortho if they recommend him to get a disability parking         "

## 2019-08-07 ENCOUNTER — TELEPHONE (OUTPATIENT)
Dept: FAMILY MEDICINE | Facility: CLINIC | Age: 64
End: 2019-08-07

## 2019-08-07 NOTE — TELEPHONE ENCOUNTER
Informed patient that Dr. Benitez will not be filling the form out form.     Patient would like to  form at the .     Form is available at the [place] 1st floor .

## 2019-08-07 NOTE — TELEPHONE ENCOUNTER
Reason for Call:  Form, our goal is to have forms completed with 72 hours, however, some forms may require a visit or additional information.    Type of letter, form or note:  Department of Public Safety    Who is the form from?: Patient    Where did the form come from: Patient or family brought in       What clinic location was the form placed at?: [facility]    Where the form was placed: Dr Benitez Mail box    What number is listed as a contact on the form?: [phone]       Additional comments: Dr Wise wants Dr Benitez to fill out the form since he saw the patient for pain     Then call the patient to  the form    Call taken on 8/7/2019 at 8:18 AM by Shruthi Moran

## 2019-08-30 ENCOUNTER — OFFICE VISIT (OUTPATIENT)
Dept: CARDIOLOGY | Facility: CLINIC | Age: 64
End: 2019-08-30
Attending: INTERNAL MEDICINE
Payer: COMMERCIAL

## 2019-08-30 VITALS
DIASTOLIC BLOOD PRESSURE: 66 MMHG | WEIGHT: 199.6 LBS | BODY MASS INDEX: 31.33 KG/M2 | HEIGHT: 67 IN | SYSTOLIC BLOOD PRESSURE: 120 MMHG | HEART RATE: 68 BPM

## 2019-08-30 DIAGNOSIS — R07.89 OTHER CHEST PAIN: ICD-10-CM

## 2019-08-30 DIAGNOSIS — I10 HYPERTENSION, UNSPECIFIED TYPE: Primary | ICD-10-CM

## 2019-08-30 DIAGNOSIS — E78.5 DYSLIPIDEMIA: ICD-10-CM

## 2019-08-30 PROCEDURE — 99204 OFFICE O/P NEW MOD 45 MIN: CPT | Performed by: INTERNAL MEDICINE

## 2019-08-30 RX ORDER — ASPIRIN 81 MG/1
81 TABLET ORAL DAILY
Qty: 90 TABLET | Refills: 3 | Status: SHIPPED | OUTPATIENT
Start: 2019-08-30 | End: 2021-06-17

## 2019-08-30 RX ORDER — ROSUVASTATIN CALCIUM 10 MG/1
10 TABLET, COATED ORAL DAILY
Qty: 90 TABLET | Refills: 3 | Status: SHIPPED | OUTPATIENT
Start: 2019-08-30 | End: 2021-04-23

## 2019-08-30 RX ORDER — LISINOPRIL 10 MG/1
10 TABLET ORAL DAILY
Qty: 90 TABLET | Refills: 3 | Status: SHIPPED | OUTPATIENT
Start: 2019-08-30 | End: 2021-04-23

## 2019-08-30 RX ORDER — ATENOLOL 25 MG/1
25 TABLET ORAL DAILY
Qty: 90 TABLET | Refills: 3 | Status: SHIPPED | OUTPATIENT
Start: 2019-08-30 | End: 2021-04-23

## 2019-08-30 RX ORDER — ASPIRIN 81 MG/1
81 TABLET ORAL DAILY
COMMUNITY
End: 2019-08-30

## 2019-08-30 ASSESSMENT — MIFFLIN-ST. JEOR: SCORE: 1659.01

## 2019-08-30 NOTE — LETTER
8/30/2019    Kristi Wise MD  [address]  [facility] [address]    RE: Jordan Miller       Dear Colleague,    I had the pleasure of seeing Jordan Miller in the [facility].    HPI and Plan:   See dictation    Orders Placed This Encounter   Procedures     NM Exercise stress test (nuc card)       Orders Placed This Encounter   Medications     atenolol (TENORMIN) 25 MG tablet     Sig: Take 1 tablet (25 mg) by mouth daily     Dispense:  90 tablet     Refill:  3     lisinopril (PRINIVIL/ZESTRIL) 10 MG tablet     Sig: Take 1 tablet (10 mg) by mouth daily     Dispense:  90 tablet     Refill:  3     rosuvastatin (CRESTOR) 10 MG tablet     Sig: Take 1 tablet (10 mg) by mouth daily     Dispense:  90 tablet     Refill:  3     DISCONTD: aspirin 81 MG EC tablet     Sig: Take 81 mg by mouth daily     aspirin 81 MG EC tablet     Sig: Take 1 tablet (81 mg) by mouth daily     Dispense:  90 tablet     Refill:  3       Encounter Diagnoses   Name Primary?     Hypertension, unspecified type Yes     Dyslipidemia      Other chest pain        CURRENT MEDICATIONS:  Current Outpatient Medications   Medication Sig Dispense Refill     aspirin 81 MG EC tablet Take 1 tablet (81 mg) by mouth daily 90 tablet 3     atenolol (TENORMIN) 25 MG tablet Take 1 tablet (25 mg) by mouth daily 90 tablet 3     Cholecalciferol (VITAMIN D PO) Take 5,000 Units by mouth       glimepiride (AMARYL) 1 MG tablet Take 1 mg by mouth every morning (before breakfast)       lisinopril (PRINIVIL/ZESTRIL) 10 MG tablet Take 1 tablet (10 mg) by mouth daily 90 tablet 3     metFORMIN (GLUCOPHAGE) 1000 MG tablet Take 1,000 mg by mouth 2 times daily (with meals).       order for DME Equipment being ordered: heel lift, md 1 Device 0     rosuvastatin (CRESTOR) 10 MG tablet Take 1 tablet (10 mg) by mouth daily 90 tablet 3     tamsulosin (FLOMAX) 0.4 MG capsule Take 0.4 mg by mouth 2 times daily          ALLERGIES     Allergies   Allergen  Reactions     No Known Allergies        PAST MEDICAL HISTORY:  History reviewed. No pertinent past medical history.    PAST SURGICAL HISTORY:  Past Surgical History:   Procedure Laterality Date     CHOLECYSTECTOMY      1994     HERNIA REPAIR, UMBILICAL      ?2013       FAMILY HISTORY:  History reviewed. No pertinent family history.    SOCIAL HISTORY:  Social History     Socioeconomic History     Marital status:      Spouse name: None     Number of children: None     Years of education: None     Highest education level: None   Occupational History     None   Social Needs     Financial resource strain: None     Food insecurity:     Worry: None     Inability: None     Transportation needs:     Medical: None     Non-medical: None   Tobacco Use     Smoking status: Never Smoker     Smokeless tobacco: Never Used   Substance and Sexual Activity     Alcohol use: Yes     Frequency: 2-3 times a week     Comment: 2-3 drinks a week hard liquor     Drug use: Never     Sexual activity: None   Lifestyle     Physical activity:     Days per week: None     Minutes per session: None     Stress: None   Relationships     Social connections:     Talks on phone: None     Gets together: None     Attends [employer] service: None     Active member of club or organization: None     Attends meetings of clubs or organizations: None     Relationship status: None     Intimate partner violence:     Fear of current or ex partner: None     Emotionally abused: None     Physically abused: None     Forced sexual activity: None   Other Topics Concern     None   Social History Narrative     None       Review of Systems:  Skin:  Negative     Eyes:  Positive for glasses  ENT:  Negative    Respiratory:  Negative    Cardiovascular:    chest pain;Positive for  Gastroenterology: Negative    Genitourinary:  Negative    Musculoskeletal:  Negative    Neurologic:  Positive for numbness or tingling of hands  Psychiatric:  Negative    Heme/Lymph/Imm:  Negative   "  Endocrine:  Positive for diabetes    Physical Exam:  Vitals: /66   Pulse 68   Ht 1.702 m (5' 7\")   Wt 90.5 kg (199 lb 9.6 oz)   BMI 31.26 kg/m       Constitutional:  cooperative, alert and oriented, well developed, well nourished, in no acute distress        Skin:  warm and dry to the touch, no apparent skin lesions or masses noted          Head:  normocephalic, no masses or lesions        Eyes:  pupils equal and round, conjunctivae and lids unremarkable, sclera white, no xanthalasma, EOMS intact, no nystagmus        Lymph:No Cervical lymphadenopathy present     ENT:  no pallor or cyanosis, dentition good        Neck:  carotid pulses are full and equal bilaterally, JVP normal, no carotid bruit        Respiratory:  normal breath sounds, clear to auscultation, normal A-P diameter, normal symmetry, normal respiratory excursion, no use of accessory muscles         Cardiac: regular rhythm, normal S1/S2, no S3 or S4, apical impulse not displaced, no murmurs, gallops or rubs                pulses full and equal, no bruits auscultated                                        GI:  abdomen soft, non-tender, BS normoactive, no mass, no HSM, no bruits        Extremities and Muscular Skeletal:  no deformities, clubbing, cyanosis, erythema observed              Neurological:  no gross motor deficits        Psych:  Alert and Oriented x 3        Recent Lab Results:  LIPID RESULTS:  Lab Results   Component Value Date    CHOL 158 07/03/2019    HDL 61 07/03/2019    LDL 82 07/03/2019    TRIG 75 07/03/2019       LIVER ENZYME RESULTS:  Lab Results   Component Value Date    AST 22 07/03/2019    ALT 30 07/03/2019       CBC RESULTS:  Lab Results   Component Value Date    WBC 6.6 07/03/2019    RBC 4.60 07/03/2019    HGB 13.4 07/03/2019    HCT 40.1 07/03/2019    MCV 87 07/03/2019    MCH 29.1 07/03/2019    MCHC 33.4 07/03/2019    RDW 13.7 07/03/2019     07/03/2019       BMP RESULTS:  Lab Results   Component Value Date    NA " 140 07/03/2019    POTASSIUM 3.7 07/03/2019    CHLORIDE 108 07/03/2019    CO2 24 07/03/2019    ANIONGAP 8 07/03/2019     (H) 07/03/2019    BUN 15 07/03/2019    CR 0.93 07/03/2019    GFRESTIMATED 87 07/03/2019    GFRESTBLACK >90 07/03/2019    BRENDAN 8.8 07/03/2019        A1C RESULTS:  Lab Results   Component Value Date    A1C 6.1 (H) 07/03/2019       INR RESULTS:  No results found for: INR        CC  Kristi Wise MD  [address]  [place] [address]                  Thank you for allowing me to participate in the care of your patient.      Sincerely,     DR KIERA LARA MD     [facility]    cc:   Kristi Wise MD  [address]  [place] [address]

## 2019-08-30 NOTE — PROGRESS NOTES
HPI and Plan:   See dictation    Orders Placed This Encounter   Procedures     NM Exercise stress test (nuc card)       Orders Placed This Encounter   Medications     atenolol (TENORMIN) 25 MG tablet     Sig: Take 1 tablet (25 mg) by mouth daily     Dispense:  90 tablet     Refill:  3     lisinopril (PRINIVIL/ZESTRIL) 10 MG tablet     Sig: Take 1 tablet (10 mg) by mouth daily     Dispense:  90 tablet     Refill:  3     rosuvastatin (CRESTOR) 10 MG tablet     Sig: Take 1 tablet (10 mg) by mouth daily     Dispense:  90 tablet     Refill:  3     DISCONTD: aspirin 81 MG EC tablet     Sig: Take 81 mg by mouth daily     aspirin 81 MG EC tablet     Sig: Take 1 tablet (81 mg) by mouth daily     Dispense:  90 tablet     Refill:  3       Encounter Diagnoses   Name Primary?     Hypertension, unspecified type Yes     Dyslipidemia      Other chest pain        CURRENT MEDICATIONS:  Current Outpatient Medications   Medication Sig Dispense Refill     aspirin 81 MG EC tablet Take 1 tablet (81 mg) by mouth daily 90 tablet 3     atenolol (TENORMIN) 25 MG tablet Take 1 tablet (25 mg) by mouth daily 90 tablet 3     Cholecalciferol (VITAMIN D PO) Take 5,000 Units by mouth       glimepiride (AMARYL) 1 MG tablet Take 1 mg by mouth every morning (before breakfast)       lisinopril (PRINIVIL/ZESTRIL) 10 MG tablet Take 1 tablet (10 mg) by mouth daily 90 tablet 3     metFORMIN (GLUCOPHAGE) 1000 MG tablet Take 1,000 mg by mouth 2 times daily (with meals).       order for DME Equipment being ordered: heel lift, md 1 Device 0     rosuvastatin (CRESTOR) 10 MG tablet Take 1 tablet (10 mg) by mouth daily 90 tablet 3     tamsulosin (FLOMAX) 0.4 MG capsule Take 0.4 mg by mouth 2 times daily          ALLERGIES     Allergies   Allergen Reactions     No Known Allergies        PAST MEDICAL HISTORY:  History reviewed. No pertinent past medical history.    PAST SURGICAL HISTORY:  Past Surgical History:   Procedure Laterality Date     CHOLECYSTECTOMY       "1994     HERNIA REPAIR, UMBILICAL      ?2013       FAMILY HISTORY:  History reviewed. No pertinent family history.    SOCIAL HISTORY:  Social History     Socioeconomic History     Marital status:      Spouse name: None     Number of children: None     Years of education: None     Highest education level: None   Occupational History     None   Social Needs     Financial resource strain: None     Food insecurity:     Worry: None     Inability: None     Transportation needs:     Medical: None     Non-medical: None   Tobacco Use     Smoking status: Never Smoker     Smokeless tobacco: Never Used   Substance and Sexual Activity     Alcohol use: Yes     Frequency: 2-3 times a week     Comment: 2-3 drinks a week hard liquor     Drug use: Never     Sexual activity: None   Lifestyle     Physical activity:     Days per week: None     Minutes per session: None     Stress: None   Relationships     Social connections:     Talks on phone: None     Gets together: None     Attends Mandaen service: None     Active member of club or organization: None     Attends meetings of clubs or organizations: None     Relationship status: None     Intimate partner violence:     Fear of current or ex partner: None     Emotionally abused: None     Physically abused: None     Forced sexual activity: None   Other Topics Concern     None   Social History Narrative     None       Review of Systems:  Skin:  Negative     Eyes:  Positive for glasses  ENT:  Negative    Respiratory:  Negative    Cardiovascular:    chest pain;Positive for  Gastroenterology: Negative    Genitourinary:  Negative    Musculoskeletal:  Negative    Neurologic:  Positive for numbness or tingling of hands  Psychiatric:  Negative    Heme/Lymph/Imm:  Negative    Endocrine:  Positive for diabetes    Physical Exam:  Vitals: /66   Pulse 68   Ht 1.702 m (5' 7\")   Wt 90.5 kg (199 lb 9.6 oz)   BMI 31.26 kg/m      Constitutional:  cooperative, alert and oriented, well " developed, well nourished, in no acute distress        Skin:  warm and dry to the touch, no apparent skin lesions or masses noted          Head:  normocephalic, no masses or lesions        Eyes:  pupils equal and round, conjunctivae and lids unremarkable, sclera white, no xanthalasma, EOMS intact, no nystagmus        Lymph:No Cervical lymphadenopathy present     ENT:  no pallor or cyanosis, dentition good        Neck:  carotid pulses are full and equal bilaterally, JVP normal, no carotid bruit        Respiratory:  normal breath sounds, clear to auscultation, normal A-P diameter, normal symmetry, normal respiratory excursion, no use of accessory muscles         Cardiac: regular rhythm, normal S1/S2, no S3 or S4, apical impulse not displaced, no murmurs, gallops or rubs                pulses full and equal, no bruits auscultated                                        GI:  abdomen soft, non-tender, BS normoactive, no mass, no HSM, no bruits        Extremities and Muscular Skeletal:  no deformities, clubbing, cyanosis, erythema observed              Neurological:  no gross motor deficits        Psych:  Alert and Oriented x 3        Recent Lab Results:  LIPID RESULTS:  Lab Results   Component Value Date    CHOL 158 07/03/2019    HDL 61 07/03/2019    LDL 82 07/03/2019    TRIG 75 07/03/2019       LIVER ENZYME RESULTS:  Lab Results   Component Value Date    AST 22 07/03/2019    ALT 30 07/03/2019       CBC RESULTS:  Lab Results   Component Value Date    WBC 6.6 07/03/2019    RBC 4.60 07/03/2019    HGB 13.4 07/03/2019    HCT 40.1 07/03/2019    MCV 87 07/03/2019    MCH 29.1 07/03/2019    MCHC 33.4 07/03/2019    RDW 13.7 07/03/2019     07/03/2019       BMP RESULTS:  Lab Results   Component Value Date     07/03/2019    POTASSIUM 3.7 07/03/2019    CHLORIDE 108 07/03/2019    CO2 24 07/03/2019    ANIONGAP 8 07/03/2019     (H) 07/03/2019    BUN 15 07/03/2019    CR 0.93 07/03/2019    GFRESTIMATED 87 07/03/2019     GFRESTBLACK >90 07/03/2019    BRENDAN 8.8 07/03/2019        A1C RESULTS:  Lab Results   Component Value Date    A1C 6.1 (H) 07/03/2019       INR RESULTS:  No results found for: INR        CC  Kristi Wise MD  [address] [place] [address]  [place] [address]

## 2019-08-30 NOTE — LETTER
8/30/2019      Kristi Wise MD  [address] [facility] [address]  [facility] [address]      RE: Jordan Miller       Dear Colleague,    I had the pleasure of seeing Jordan Miller in the [facility].    Service Date: 08/30/2019      HISTORY OF PRESENT ILLNESS:  I had the opportunity of seeing Mr. Miller today at the request of his primary physician, Dr. Kristi Wise.  I believe Dr. Wise referred him for management of hypertension.  However, the patient was quite normotensive in our offices today, and I do see from his blood pressure readings of 105/62 and 111/66, both in 07/2019, that he is normotensive.  He is tolerating atenolol and lisinopril well.  I do think his blood pressure is well controlled for somebody who is a diabetic.  I will defer further management of this condition in the capable hands of Dr. Wise.      This patient tells me he wants an angiogram.  When he lived in [place], [place], he had an angiogram.  He tells me that he did not have chest pains at the time.  He had a stress test, and after that his cardiologist recommended coronary angiography.  After this procedure, he was told he did not have any significant obstructive coronary artery disease.  He does not smoke, drink or abuse drugs.  He tells me his family history is negative for premature atherosclerotic disease.  He does have chest pains.  He tells me that on occasion, it is on the left side of his chest and on occasion on the right side of his chest.  He does not know any specific provoking factors.  He does not know how long the pain lasts for.  He denies associated nausea, vomiting, diaphoresis or shortness of breath.  He runs 20 minutes a few times a week on a treadmill, and he swims as well.  He does not get such pains during these activities.  His cardiac examination is unremarkable.  His baseline ECG from July showed no changes of ischemia.      IMPRESSION:   1.  Well-controlled hypertension.   2.  Dyslipidemia.   He is on rosuvastatin 10 mg, and the numbers are very good with LDL in the 80s and HDL in the 60s.   3.  Long-standing diabetes mellitus of 20 years' duration.  The patient denies complications.   4.  Atypical chest discomfort.  Unlikely cardiac in origin.      This gentleman has very atypical chest discomfort without EKG changes.  I do not think an angiogram is justified at this time, but I think a stress test is definitely just fine.  I will request one.  I am not sure what sort of abnormality he had years ago.  I will personally review this test and will consider angiography if necessary based on his results.  Conversely, if it is normal, I do not think he needs further cardiac workup or followup.         KIERA LARA MD, [facility]             D: 2019   T: 2019   MT: joo      Name:     KINZA LOZANO   MRN:      [id number]        Account:      [id number]   :      1955           Service Date: 2019      Document: [id number]         Outpatient Encounter Medications as of 2019   Medication Sig Dispense Refill     aspirin 81 MG EC tablet Take 1 tablet (81 mg) by mouth daily 90 tablet 3     atenolol (TENORMIN) 25 MG tablet Take 1 tablet (25 mg) by mouth daily 90 tablet 3     Cholecalciferol (VITAMIN D PO) Take 5,000 Units by mouth       glimepiride (AMARYL) 1 MG tablet Take 1 mg by mouth every morning (before breakfast)       lisinopril (PRINIVIL/ZESTRIL) 10 MG tablet Take 1 tablet (10 mg) by mouth daily 90 tablet 3     metFORMIN (GLUCOPHAGE) 1000 MG tablet Take 1,000 mg by mouth 2 times daily (with meals).       order for DME Equipment being ordered: heel lift, md 1 Device 0     rosuvastatin (CRESTOR) 10 MG tablet Take 1 tablet (10 mg) by mouth daily 90 tablet 3     tamsulosin (FLOMAX) 0.4 MG capsule Take 0.4 mg by mouth 2 times daily        [DISCONTINUED] aspirin 81 MG EC tablet Take 81 mg by mouth daily       [DISCONTINUED] ASPIRIN 81 PO        [DISCONTINUED] atenolol (TENORMIN) 25 MG  tablet Take 25 mg by mouth daily.       [DISCONTINUED] lisinopril (PRINIVIL/ZESTRIL) 10 MG tablet Take 10 mg by mouth daily       [DISCONTINUED] rosuvastatin (CRESTOR) 10 MG tablet Take 1 tablet (10 mg) by mouth daily 90 tablet 3     No facility-administered encounter medications on file as of 8/30/2019.        Again, thank you for allowing me to participate in the care of your patient.      Sincerely,    DR KIERA LARA MD     [facility]

## 2019-08-30 NOTE — PROGRESS NOTES
Service Date: 08/30/2019      HISTORY OF PRESENT ILLNESS:  I had the opportunity of seeing Mr. Miller today at the request of his primary physician, Dr. Kristi Wise.  I believe Dr. Wise referred him for management of hypertension.  However, the patient was quite normotensive in our offices today, and I do see from his blood pressure readings of 105/62 and 111/66, both in 07/2019, that he is normotensive.  He is tolerating atenolol and lisinopril well.  I do think his blood pressure is well controlled for somebody who is a diabetic.  I will defer further management of this condition in the capable hands of Dr. Wise.      This patient tells me he wants an angiogram.  When he lived in [place], [place], he had an angiogram.  He tells me that he did not have chest pains at the time.  He had a stress test, and after that his cardiologist recommended coronary angiography.  After this procedure, he was told he did not have any significant obstructive coronary artery disease.  He does not smoke, drink or abuse drugs.  He tells me his family history is negative for premature atherosclerotic disease.  He does have chest pains.  He tells me that on occasion, it is on the left side of his chest and on occasion on the right side of his chest.  He does not know any specific provoking factors.  He does not know how long the pain lasts for.  He denies associated nausea, vomiting, diaphoresis or shortness of breath.  He runs 20 minutes a few times a week on a treadmill, and he swims as well.  He does not get such pains during these activities.  His cardiac examination is unremarkable.  His baseline ECG from July showed no changes of ischemia.      IMPRESSION:   1.  Well-controlled hypertension.   2.  Dyslipidemia.  He is on rosuvastatin 10 mg, and the numbers are very good with LDL in the 80s and HDL in the 60s.   3.  Long-standing diabetes mellitus of 20 years' duration.  The patient denies complications.   4.  Atypical chest  discomfort.  Unlikely cardiac in origin.      This gentleman has very atypical chest discomfort without EKG changes.  I do not think an angiogram is justified at this time, but I think a stress test is definitely just fine.  I will request one.  I am not sure what sort of abnormality he had years ago.  I will personally review this test and will consider angiography if necessary based on his results.  Conversely, if it is normal, I do not think he needs further cardiac workup or followup.         KIERA LARA MD, [facility]             D: 2019   T: 2019   MT: joo      Name:     KINZA LOZANO   MRN:      [id number]        Account:      [id number]   :      1955           Service Date: 2019      Document: [id number]

## 2019-09-03 ENCOUNTER — TELEPHONE (OUTPATIENT)
Dept: CARDIOLOGY | Facility: CLINIC | Age: 64
End: 2019-09-03

## 2019-09-03 ENCOUNTER — HOSPITAL ENCOUNTER (OUTPATIENT)
Dept: CARDIOLOGY | Facility: CLINIC | Age: 64
Discharge: HOME | End: 2019-09-03
Attending: INTERNAL MEDICINE | Admitting: INTERNAL MEDICINE
Payer: COMMERCIAL

## 2019-09-03 DIAGNOSIS — E78.5 DYSLIPIDEMIA: ICD-10-CM

## 2019-09-03 DIAGNOSIS — R07.89 OTHER CHEST PAIN: ICD-10-CM

## 2019-09-03 DIAGNOSIS — I10 HYPERTENSION, UNSPECIFIED TYPE: ICD-10-CM

## 2019-09-03 PROCEDURE — 78452 HT MUSCLE IMAGE SPECT MULT: CPT | Mod: 26 | Performed by: INTERNAL MEDICINE

## 2019-09-03 PROCEDURE — 34300033 ZZH RX 343: Performed by: INTERNAL MEDICINE

## 2019-09-03 PROCEDURE — A9502 TC99M TETROFOSMIN: HCPCS | Performed by: INTERNAL MEDICINE

## 2019-09-03 PROCEDURE — 93018 CV STRESS TEST I&R ONLY: CPT | Performed by: INTERNAL MEDICINE

## 2019-09-03 PROCEDURE — 78452 HT MUSCLE IMAGE SPECT MULT: CPT

## 2019-09-03 PROCEDURE — 93016 CV STRESS TEST SUPVJ ONLY: CPT | Performed by: INTERNAL MEDICINE

## 2019-09-03 RX ADMIN — TETROFOSMIN 8.99 MCI.: 1.38 INJECTION, POWDER, LYOPHILIZED, FOR SOLUTION INTRAVENOUS at 14:13

## 2019-09-03 RX ADMIN — TETROFOSMIN 3.4 MCI.: 1.38 INJECTION, POWDER, LYOPHILIZED, FOR SOLUTION INTRAVENOUS at 12:17

## 2019-09-03 NOTE — TELEPHONE ENCOUNTER
Call from check in patient stopped in and is requesting a refill of his diabetic medications.  He states the nurse who checked him in the exam room copied all his medications and stated that  would refill his diabetic medications.  He went to the pharmacy today and the diabetic medications were not there to be picked up.    Reviewed his cardiac medications and informed patient that Dr. Montano can only refill his cardiac meds.  He needs to contact Dr. Wise his primary for his other medications.  Vitamin D, Metformin etc.  He voiced his frustration as to why the nurse would say that if it isn't true?  Reviewed our policy for only refilling cardiac medications and reiterated that he needed to contact his PMD.  Patient verbalized understanding and agreed to plan of care.

## 2019-11-11 ENCOUNTER — OFFICE VISIT (OUTPATIENT)
Dept: ORTHOPEDICS | Facility: CLINIC | Age: 64
End: 2019-11-11
Payer: COMMERCIAL

## 2019-11-11 VITALS
BODY MASS INDEX: 31.23 KG/M2 | HEIGHT: 67 IN | SYSTOLIC BLOOD PRESSURE: 135 MMHG | WEIGHT: 199 LBS | DIASTOLIC BLOOD PRESSURE: 73 MMHG

## 2019-11-11 DIAGNOSIS — M67.88 ACHILLES TENDINOSIS OF LEFT LOWER EXTREMITY: Primary | ICD-10-CM

## 2019-11-11 PROCEDURE — 99213 OFFICE O/P EST LOW 20 MIN: CPT | Performed by: FAMILY MEDICINE

## 2019-11-11 ASSESSMENT — MIFFLIN-ST. JEOR: SCORE: 1656.29

## 2019-11-11 NOTE — LETTER
11/11/2019         RE: Jordan Miller  [address] [facility] [address]        Dear Colleague,    Thank you for referring your patient, Jordan Miller, to the  SPORTS MEDICINE. Please see a copy of my visit note below.    [facility]   Follow-up Visit s Nov 11, 2019    PCP: Kristi Wise      Subjective:  Jordan is a 64 year old male who is seen in follow up for evaluation of No chief complaint on file.    His last visit was on 7/15/2019.  Since that time, symptoms have been better than before. Jordan Miller is accompanied today by self.     Patient reports 25% improvement since last visit.  Patient has noticed improved symptoms with heel lift treatment.  More supportive shoes are helpful, but acknowledges not having been able to proceed with stretching or physical therapy.    Pain is located left heel, waxing and waning.  Patient is heel lift.    Patient denies any new injuries.    Patient's past medical, surgical, social and family histories are reviewed today.      Social History: retired       Patient Active Problem List    Diagnosis Date Noted     Primary osteoarthritis of both knees 07/15/2019     Priority: Medium     Achilles tendinosis of left lower extremity 07/15/2019     Priority: Medium     Atypical chest pain 07/04/2019     Priority: Medium     Type 2 diabetes mellitus without complication, without long-term current use of insulin (H) 07/04/2019     Priority: Medium     Hypertension, unspecified type 07/04/2019     Priority: Medium     Benign prostatic hyperplasia with urinary obstruction 07/04/2019     Priority: Medium     Dyslipidemia 07/04/2019     Priority: Medium     Chronic pain of left ankle 07/03/2019     Priority: Medium     Left knee pain 07/03/2019     Priority: Medium     FMHX denies sig illnesses.      Social History     Socioeconomic History     Marital status:      Spouse name: Not on file     Number of children: Not on file     Years of  "education: Not on file     Highest education level: Not on file   Occupational History     Not on file   Social Needs     Financial resource strain: Not on file     Food insecurity:     Worry: Not on file     Inability: Not on file     Transportation needs:     Medical: Not on file     Non-medical: Not on file   Tobacco Use     Smoking status: Never Smoker     Smokeless tobacco: Never Used   Substance and Sexual Activity     Alcohol use: Yes     Frequency: 2-3 times a week     Comment: 2-3 drinks a week hard liquor       Past Surgical History:   Procedure Laterality Date     CHOLECYSTECTOMY      1994     HERNIA REPAIR, UMBILICAL      ?2013           Review of Systems   Musculoskeletal: Positive for joint pain.   All other systems reviewed and are negative.        Physical Exam  /73   Ht 1.702 m (5' 7\")   Wt 90.3 kg (199 lb)   BMI 31.17 kg/m     Constitutional:well-developed, well-nourished, and in no distress.   Cardiovascular: Intact distal pulses.    Neurological: alert. Gait Normal:   Gait, station, stance, and balance appear normal for age  Skin: Skin is warm and dry.   Psychiatric: Mood and affect normal.   Respiratory: unlabored, speaks in full sentences  Lymph: no LAD, no lymphangitis      Left Ankle Exam     Tenderness   Left ankle tenderness location: Distal calcaneal region at Achilles insertion.     Range of Motion   Dorsiflexion: 10   Plantar flexion: normal   Eversion: normal   Inversion: normal     Muscle Strength   Dorsiflexion:  5/5   Plantar flexion:  5/5   Anterior tibial:  5/5   Posterior tibial:  5/5  Gastrocsoleus:  5/5  Peroneal muscle:  5/5    Other   Erythema: absent  Scars: absent  Sensation: normal  Pulse: present    Comments:  Achilles intact               ASSESSMENT/PLAN    ICD-10-CM    1. Achilles tendinosis of left lower extremity M67.88 [facility] REFERRAL     order for DME       Persistent insertional Achilles tendinopathy, partially improved with quarter " inch heel lift.  Will replace with a more substantial silicone heel wedge, and he is instructed on bend and straighten the calf stretching.  Additionally, will send to physical therapy for additional measures.      Again, thank you for allowing me to participate in the care of your patient.        Sincerely,        Masood Benitez MD

## 2019-11-11 NOTE — PROGRESS NOTES
[facility]   Follow-up Visit s Nov 11, 2019    PCP: Kristi Wise      Subjective:  Jordan is a 64 year old male who is seen in follow up for evaluation of No chief complaint on file.    His last visit was on 7/15/2019.  Since that time, symptoms have been better than before. Jordan Miller is accompanied today by self.     Patient reports 25% improvement since last visit.  Patient has noticed improved symptoms with heel lift treatment.  More supportive shoes are helpful, but acknowledges not having been able to proceed with stretching or physical therapy.    Pain is located left heel, waxing and waning.  Patient is heel lift.    Patient denies any new injuries.    Patient's past medical, surgical, social and family histories are reviewed today.      Social History: retired       Patient Active Problem List    Diagnosis Date Noted     Primary osteoarthritis of both knees 07/15/2019     Priority: Medium     Achilles tendinosis of left lower extremity 07/15/2019     Priority: Medium     Atypical chest pain 07/04/2019     Priority: Medium     Type 2 diabetes mellitus without complication, without long-term current use of insulin (H) 07/04/2019     Priority: Medium     Hypertension, unspecified type 07/04/2019     Priority: Medium     Benign prostatic hyperplasia with urinary obstruction 07/04/2019     Priority: Medium     Dyslipidemia 07/04/2019     Priority: Medium     Chronic pain of left ankle 07/03/2019     Priority: Medium     Left knee pain 07/03/2019     Priority: Medium     FMHX denies sig illnesses.      Social History     Socioeconomic History     Marital status:      Spouse name: Not on file     Number of children: Not on file     Years of education: Not on file     Highest education level: Not on file   Occupational History     Not on file   Social Needs     Financial resource strain: Not on file     Food insecurity:     Worry: Not on file     Inability: Not on file  "    Transportation needs:     Medical: Not on file     Non-medical: Not on file   Tobacco Use     Smoking status: Never Smoker     Smokeless tobacco: Never Used   Substance and Sexual Activity     Alcohol use: Yes     Frequency: 2-3 times a week     Comment: 2-3 drinks a week hard liquor       Past Surgical History:   Procedure Laterality Date     CHOLECYSTECTOMY      1994     HERNIA REPAIR, UMBILICAL      ?2013           Review of Systems   Musculoskeletal: Positive for joint pain.   All other systems reviewed and are negative.        Physical Exam  /73   Ht 1.702 m (5' 7\")   Wt 90.3 kg (199 lb)   BMI 31.17 kg/m    Constitutional:well-developed, well-nourished, and in no distress.   Cardiovascular: Intact distal pulses.    Neurological: alert. Gait Normal:   Gait, station, stance, and balance appear normal for age  Skin: Skin is warm and dry.   Psychiatric: Mood and affect normal.   Respiratory: unlabored, speaks in full sentences  Lymph: no LAD, no lymphangitis      Left Ankle Exam     Tenderness   Left ankle tenderness location: Distal calcaneal region at Achilles insertion.     Range of Motion   Dorsiflexion: 10   Plantar flexion: normal   Eversion: normal   Inversion: normal     Muscle Strength   Dorsiflexion:  5/5   Plantar flexion:  5/5   Anterior tibial:  5/5   Posterior tibial:  5/5  Gastrocsoleus:  5/5  Peroneal muscle:  5/5    Other   Erythema: absent  Scars: absent  Sensation: normal  Pulse: present    Comments:  Achilles intact               ASSESSMENT/PLAN    ICD-10-CM    1. Achilles tendinosis of left lower extremity M67.88 [facility], [facility]RA[facility] REFERRAL     order for DME       Persistent insertional Achilles tendinopathy, partially improved with quarter inch heel lift.  Will replace with a more substantial silicone heel wedge, and he is instructed on bend and straighten the calf stretching.  Additionally, will send to physical therapy for additional measures.  "

## 2019-11-13 ENCOUNTER — THERAPY VISIT (OUTPATIENT)
Dept: PHYSICAL THERAPY | Facility: CLINIC | Age: 64
End: 2019-11-13
Attending: FAMILY MEDICINE
Payer: COMMERCIAL

## 2019-11-13 DIAGNOSIS — M67.88 ACHILLES TENDINOSIS OF LEFT LOWER EXTREMITY: ICD-10-CM

## 2019-11-13 DIAGNOSIS — M25.572 CHRONIC PAIN OF LEFT ANKLE: Primary | ICD-10-CM

## 2019-11-13 DIAGNOSIS — G89.29 CHRONIC PAIN OF LEFT ANKLE: Primary | ICD-10-CM

## 2019-11-13 PROCEDURE — 97110 THERAPEUTIC EXERCISES: CPT | Mod: GP | Performed by: PHYSICAL THERAPIST

## 2019-11-13 PROCEDURE — 97161 PT EVAL LOW COMPLEX 20 MIN: CPT | Mod: GP | Performed by: PHYSICAL THERAPIST

## 2019-11-13 NOTE — PROGRESS NOTES
[facility] Initial Evaluation  Subjective:  Pt is a pleasant 65 y/o male presenting to PT with left achilles pain. Pt reports that reports that his achilles on the left side has been hurting for 6 months now without known cause. [place] that [place] in [place] first 6 months or the year and wore older shoes while walking more frequently. He recently started wearing newer shoes and was given a heel lift which is reducing his pain. His chief complaint is pain with prolonged walking and certain weight bearing activity. He denies any vague symptoms. He went to the doctor and was diagnosed with achilles tendonitis. He works out regularly and would like to decrease the pain in the achilles.     The history is provided by the patient. No  was used.   Jordan Miller being seen for Left achilles pain.   Date of Onset: 6 months ago. Where condition occurred: for unknown reasons.Problem occurred: unsure, walking  and reported as 4/10 on pain scale.  Pertinent medical history includes:  High blood pressure, diabetes and overweight.   Other medical allergies details: none.  Surgeries include:  Orthopedic surgery. Other surgery history details: gallbladder.  Current medications:  High blood pressure medication.     Pain is described as aching and is intermittent. Pain timing: not consistent. Since onset symptoms are gradually improving. Special tests:  X-ray (negative).     Patient is self employed . Restrictions include:  Working in normal job without restrictions.    Barriers include:  None as reported by patient.  Red flags:  None as reported by patient.  Type of problem:  Left ankle (left achilles region)   Condition occurred with:  Repetition/overuse and insidious onset. This is a chronic condition    Patient reports pain:  Posterior (achilles tendon). Radiates to:  No radiation. Associated symptoms:  Loss of motion/stiffness. Symptoms are exacerbated by walking, weight bearing,  certain positions, activity, ascending stairs, descending stairs, running and bending/squatting Relieved by: shoe change, orthotics.                      Objective:  Standing Alignment:                Ankle/Foot:  Pes planus L and pes planus R    Gait:  Slight limp    Gait Type:  Antalgic   Assistive Devices:  None  Deviations:  General Deviations:  Toe in L    Flexibility/Screens:   Negative screens: Hip or Knee     Lower Extremity:  Decreased left lower extremity flexibility:Gastroc and Soleus            Ankle/Foot Evaluation  ROM:    AROM:    Dorsiflexion:  Left:   4 tight  Right:   8 tight  Plantarflexion:  Left:  40 slight pain    Right:  40  Inversion:  Left:  25     Right:  25  Eversion:  15     Right:  15  Great toe flexion:  Left:  Full     Right:  Full  Great Toe Extension:  Left:  Full     Right: full  PROM:    Dorsiflexion:  Left:    8 tight     Right:   10   Plantarflexion:  Left:    45     Right:  45   Inversion:  Left:      30     Right:   30  Eversion: Left:   20     Right:  20  Great Toe Flexion:  Left:  Full     Right:  Full    Great Toe Extension:  Left:    Full     Right: full      Strength:    Dorsiflexion:  Left: 5/5   Strong/pain free    Pain:   Right: 5/5   Strong/pain free  Pain:  Plantarflexion: Left:  4+/5   Strong/painful  Pain:+   Right: 5/5  Strong/pain free  Pain:  Inversion:Left: 5-/5  Strong/pain free  Pain:     Right: 5/5  Strong/pain free  Pain:  Eversion:Left: 5/5  Strong/pain free  Pain:  Right: 5/5  Strong/pain free  Pain:  Flexion Great Toe:Left: 5/5  Strong/pain free  Pain:  Right: 5/5   Strong/pain free  Pain:  Extension Great Toe:Left: 5/5  Strong/pain free  Pain:  Right: 5/5  Strong/pain free  Pain:              LIGAMENT TESTING: normal  Anterior Drawer (ATF) Left: neg   Anterior Drawer (ATF) Right: neg  Posterior Drawer (PTF) Left: neg   Posterior Drawer (PTF) Right: neg  Varus Stress (Calc Fib) Left: neg    Varus Stress (Calc Fib) Right: neg  Valgus Stress (Deltoid) Left:  neg    Valgus Stress (Deltoid) Right: neg  Rotation (Deltoid) Left: neg    Rotation (Deltoid) Right: neg  External Rotation (High Ankle) Left: neg     External Rotation (High Ankle) Right: neg  SPECIAL TESTS: normal    Left ankle negative for the following special tests:  Alicea sign    Right ankle negative for the following special tests:  Alicea sign  PALPATION:   Left ankle tenderness present at:  gastroc/soleus and achilles tendon  Left ankle tenderness not present at:   anterior tibialis; posterior tibialis; deltoid ligament; plantar fascia; navicular; medial calcaneal; peroneals; anterior talofibular ligament; posterior talofibular ligament; calcaneofibular ligament; medial malleolus or lateral malleolus    Right ankle tenderness not present at:  gastroc/soleus; achilles tendon; anterior tibialis; posterior tibialis; deltoid ligament; plantar fascia; medial calcaneal; anterior talofibular ligament; posterior talofibular ligament; calcaneofibular ligament; medial malleolus or lateral malleolus  EDEMA: Edema ankle: noticable thickening of left achilles tendon compared to the right.          MOBILITY TESTING:   Tib-Fib Proximal Left: normal    Tib-Fib Proximal Right: normal  Tib-Fib Distal Left: normal    Tib-Fib Distal Right: normal  Talocrural Left: hypomobile    Talocrural Right: normal  Subtalar Left: hypomobile    Subtalar Right: normal  Midtarsal Left: normal    Midtarsal Right: normal  First Ray Left: normal    First Ray Right: normal  FUNCTIONAL TESTS: Functional test ankle: fair functional squat, good SLS aryan.                                                   Hip Evaluation  HIP AROM:  AROM:    Left Hip:     Normal    Right Hip:   Normal                               Knee Evaluation:  ROM:  AROM: normal                              General     ROS    Assessment/Plan:    Patient is a 64 year old male with left side ankle complaints.    Patient has the following significant findings with corresponding  treatment plan.                Diagnosis 1:  Left achilles/ankle pain, achilles tendonopathy  Pain -  manual therapy, education and home program  Decreased ROM/flexibility - manual therapy and therapeutic exercise  Decreased joint mobility - manual therapy and therapeutic exercise  Decreased strength - therapeutic exercise and therapeutic activities  Impaired gait - gait training  Impaired muscle performance - neuro re-education  Decreased function - therapeutic activities  Impaired posture - neuro re-education    Therapy Evaluation Codes:   1) History comprised of:   Personal factors that impact the plan of care:      Past/current experiences and Time since onset of symptoms.    Comorbidity factors that impact the plan of care are:      Diabetes, High blood pressure and Overweight.     Medications impacting care: High blood pressure.  2) Examination of Body Systems comprised of:   Body structures and functions that impact the plan of care:      Ankle.   Activity limitations that impact the plan of care are:      Jumping, Running, Squatting/kneeling, Stairs, Standing and Walking.  3) Clinical presentation characteristics are:   Stable/Uncomplicated.  4) Decision-Making    Low complexity using standardized patient assessment instrument and/or measureable assessment of functional outcome.  Cumulative Therapy Evaluation is: Low complexity.    Previous and current functional limitations:  (See Goal Flow Sheet for this information)    Short term and Long term goals: (See Goal Flow Sheet for this information)     Communication ability:  Patient appears to be able to clearly communicate and understand verbal and written communication and follow directions correctly.  Treatment Explanation - The following has been discussed with the patient:   RX ordered/plan of care  Anticipated outcomes  Possible risks and side effects  This patient would benefit from PT intervention to resume normal activities.   Rehab potential is  excellent.    Frequency:  1 X week, once daily  Duration:  for 6 weeks  Discharge Plan:  Achieve all LTG.  Independent in home treatment program.  Reach maximal therapeutic benefit.    Please refer to the daily flowsheet for treatment today, total treatment time and time spent performing 1:1 timed codes.

## 2019-11-20 ENCOUNTER — THERAPY VISIT (OUTPATIENT)
Dept: PHYSICAL THERAPY | Facility: CLINIC | Age: 64
End: 2019-11-20
Payer: COMMERCIAL

## 2019-11-20 DIAGNOSIS — M67.88 ACHILLES TENDINOSIS OF LEFT LOWER EXTREMITY: Primary | ICD-10-CM

## 2019-11-20 DIAGNOSIS — M25.572 CHRONIC PAIN OF LEFT ANKLE: ICD-10-CM

## 2019-11-20 DIAGNOSIS — G89.29 CHRONIC PAIN OF LEFT ANKLE: ICD-10-CM

## 2019-11-20 PROCEDURE — 97140 MANUAL THERAPY 1/> REGIONS: CPT | Mod: GP | Performed by: PHYSICAL THERAPIST

## 2019-11-20 PROCEDURE — 97110 THERAPEUTIC EXERCISES: CPT | Mod: GP | Performed by: PHYSICAL THERAPIST

## 2019-11-27 ENCOUNTER — THERAPY VISIT (OUTPATIENT)
Dept: PHYSICAL THERAPY | Facility: CLINIC | Age: 64
End: 2019-11-27
Payer: COMMERCIAL

## 2019-11-27 DIAGNOSIS — M25.572 CHRONIC PAIN OF LEFT ANKLE: ICD-10-CM

## 2019-11-27 DIAGNOSIS — M67.88 ACHILLES TENDINOSIS OF LEFT LOWER EXTREMITY: Primary | ICD-10-CM

## 2019-11-27 DIAGNOSIS — G89.29 CHRONIC PAIN OF LEFT ANKLE: ICD-10-CM

## 2019-11-27 PROCEDURE — 97140 MANUAL THERAPY 1/> REGIONS: CPT | Mod: GP | Performed by: PHYSICAL THERAPIST

## 2019-11-27 PROCEDURE — 97110 THERAPEUTIC EXERCISES: CPT | Mod: GP | Performed by: PHYSICAL THERAPIST

## 2019-12-05 ENCOUNTER — THERAPY VISIT (OUTPATIENT)
Dept: PHYSICAL THERAPY | Facility: CLINIC | Age: 64
End: 2019-12-05
Payer: COMMERCIAL

## 2019-12-05 DIAGNOSIS — M67.88 ACHILLES TENDINOSIS OF LEFT LOWER EXTREMITY: Primary | ICD-10-CM

## 2019-12-05 DIAGNOSIS — M25.572 CHRONIC PAIN OF LEFT ANKLE: ICD-10-CM

## 2019-12-05 DIAGNOSIS — G89.29 CHRONIC PAIN OF LEFT ANKLE: ICD-10-CM

## 2019-12-05 PROCEDURE — 97110 THERAPEUTIC EXERCISES: CPT | Mod: GP | Performed by: PHYSICAL THERAPIST

## 2019-12-05 PROCEDURE — 97140 MANUAL THERAPY 1/> REGIONS: CPT | Mod: GP | Performed by: PHYSICAL THERAPIST

## 2020-01-09 ENCOUNTER — THERAPY VISIT (OUTPATIENT)
Dept: PHYSICAL THERAPY | Facility: CLINIC | Age: 65
End: 2020-01-09
Payer: COMMERCIAL

## 2020-01-09 DIAGNOSIS — G89.29 CHRONIC PAIN OF LEFT ANKLE: Primary | ICD-10-CM

## 2020-01-09 DIAGNOSIS — M25.572 CHRONIC PAIN OF LEFT ANKLE: Primary | ICD-10-CM

## 2020-01-09 PROCEDURE — 97140 MANUAL THERAPY 1/> REGIONS: CPT | Mod: GP | Performed by: PHYSICAL THERAPIST

## 2020-01-09 PROCEDURE — 97110 THERAPEUTIC EXERCISES: CPT | Mod: GP | Performed by: PHYSICAL THERAPIST

## 2020-01-13 ENCOUNTER — THERAPY VISIT (OUTPATIENT)
Dept: PHYSICAL THERAPY | Facility: CLINIC | Age: 65
End: 2020-01-13
Payer: COMMERCIAL

## 2020-01-13 DIAGNOSIS — M25.572 PAIN IN JOINT, ANKLE AND FOOT, LEFT: Primary | ICD-10-CM

## 2020-01-13 PROCEDURE — 97140 MANUAL THERAPY 1/> REGIONS: CPT | Mod: GP | Performed by: PHYSICAL THERAPIST

## 2020-01-13 PROCEDURE — 97110 THERAPEUTIC EXERCISES: CPT | Mod: GP | Performed by: PHYSICAL THERAPIST

## 2020-01-16 ENCOUNTER — THERAPY VISIT (OUTPATIENT)
Dept: PHYSICAL THERAPY | Facility: CLINIC | Age: 65
End: 2020-01-16
Payer: COMMERCIAL

## 2020-01-16 DIAGNOSIS — M67.88 ACHILLES TENDINOSIS OF LEFT LOWER EXTREMITY: ICD-10-CM

## 2020-01-16 DIAGNOSIS — M25.572 CHRONIC PAIN OF LEFT ANKLE: Primary | ICD-10-CM

## 2020-01-16 DIAGNOSIS — G89.29 CHRONIC PAIN OF LEFT ANKLE: Primary | ICD-10-CM

## 2020-01-16 PROCEDURE — 97110 THERAPEUTIC EXERCISES: CPT | Mod: GP | Performed by: PHYSICAL THERAPIST

## 2020-01-16 PROCEDURE — 97140 MANUAL THERAPY 1/> REGIONS: CPT | Mod: GP | Performed by: PHYSICAL THERAPIST

## 2020-01-22 ENCOUNTER — THERAPY VISIT (OUTPATIENT)
Dept: PHYSICAL THERAPY | Facility: CLINIC | Age: 65
End: 2020-01-22
Payer: COMMERCIAL

## 2020-01-22 DIAGNOSIS — S86.012A RUPTURE OF ACHILLES TENDON, LEFT, INITIAL ENCOUNTER: Primary | ICD-10-CM

## 2020-01-22 PROCEDURE — 97140 MANUAL THERAPY 1/> REGIONS: CPT | Mod: GP | Performed by: PHYSICAL THERAPIST

## 2020-01-22 PROCEDURE — 97110 THERAPEUTIC EXERCISES: CPT | Mod: GP | Performed by: PHYSICAL THERAPIST

## 2020-01-22 NOTE — PROGRESS NOTES
Subjective:  HPI                    Objective:  System    Physical Exam    General     ROS    Assessment/Plan:    DISCHARGE REPORT    Progress reporting period is from start of care to 1/22/2020.       SUBJECTIVE  Subjective changes noted by patient:   Feeling better in general achilles and LB.  Preparing for travel to [place] next week.     Current Pain level: 1/10.     Initial Pain level: 4/10.   Changes in function:  Yes (See Goal flowsheet attached for changes in current functional level)  Adverse reaction to treatment or activity: None    OBJECTIVE  Changes noted in objective findings:  The objective findings below are from DOS 1/22/2020.  Objective: Continues to improve in lumbar ROM with new HEP. Ankle AROM WNL B.  Lumbar extension and B rotation mildly limited. Good control with bridges and SLS.     ASSESSMENT/PLAN  STG/LTGs have been met or progress has been made towards goals:  Yes (See Goal flow sheet completed today.)  Assessment of Progress: The patient's condition is improving.  Self Management Plans:  Patient is independent in a home treatment program.  Patient is independent in self management of symptoms.    Recommendations:  This patient is ready to be discharged from therapy and continue their home treatment program.    Please refer to the daily flowsheet for treatment today, total treatment time and time spent performing 1:1 timed codes.

## 2020-01-27 ENCOUNTER — THERAPY VISIT (OUTPATIENT)
Dept: PHYSICAL THERAPY | Facility: CLINIC | Age: 65
End: 2020-01-27
Payer: COMMERCIAL

## 2020-01-27 DIAGNOSIS — M67.88 ACHILLES TENDINOSIS OF LEFT LOWER EXTREMITY: Primary | ICD-10-CM

## 2020-01-27 PROCEDURE — 97110 THERAPEUTIC EXERCISES: CPT | Mod: GP | Performed by: PHYSICAL THERAPIST

## 2020-01-27 PROCEDURE — 97140 MANUAL THERAPY 1/> REGIONS: CPT | Mod: GP | Performed by: PHYSICAL THERAPIST

## 2020-01-27 NOTE — PROGRESS NOTES
DISCHARGE REPORT    Progress reporting period is from 11/13/19 to 1/27/20 (9 visits).       SUBJECTIVE  Subjective changes noted by patient:  Notes that B gastroc/Achilles and low back are now doing well.  He did want to have this last visit before he traveled to [facility] for the next few months. .       Current pain level is 0/10  .     Initial Pain level: 4/10.   Changes in function:  Yes (See Goal flowsheet attached for changes in current functional level)  Adverse reaction to treatment or activity: None    OBJECTIVE  Changes noted in objective findings:  Yes,   No pain on DL concentric or eccentric calf raise.   SLS for 30 sec level eyes open.   No notable pain/tenderness with palpation in gastroc/achilles.  Mild tightness R medial head gastroc yet.         ASSESSMENT/PLAN  Updated problem list and treatment plan: Diagnosis 1:  L achilles tendinopathy    Will continue HEP independently for any remaining deficits.   STG/LTGs have been met or progress has been made towards goals:  Yes (See Goal flow sheet completed today.)  Assessment of Progress: The patient's condition is improving.  Self Management Plans:  Patient has been instructed in a home treatment program.  I have re-evaluated this patient and find that the nature, scope, duration and intensity of the therapy is appropriate for the medical condition of the patient.  Jordan continues to require the following intervention to meet STG and LTG's:  PT intervention is no longer required to meet STG/LTG.    Recommendations:  This patient is ready to be discharged from therapy and continue their home treatment program.    Please refer to the daily flowsheet for treatment today, total treatment time and time spent performing 1:1 timed codes.

## 2021-04-21 ENCOUNTER — TRANSFERRED RECORDS (OUTPATIENT)
Dept: MULTI SPECIALTY CLINIC | Facility: CLINIC | Age: 66
End: 2021-04-21

## 2021-04-21 LAB — RETINOPATHY: NORMAL

## 2021-04-22 ENCOUNTER — TRANSFERRED RECORDS (OUTPATIENT)
Dept: HEALTH INFORMATION MANAGEMENT | Facility: CLINIC | Age: 66
End: 2021-04-22

## 2021-04-22 LAB — RETINOPATHY: NORMAL

## 2021-04-23 ENCOUNTER — ANCILLARY PROCEDURE (OUTPATIENT)
Dept: GENERAL RADIOLOGY | Facility: CLINIC | Age: 66
End: 2021-04-23
Attending: INTERNAL MEDICINE
Payer: MEDICAID

## 2021-04-23 ENCOUNTER — TELEPHONE (OUTPATIENT)
Dept: FAMILY MEDICINE | Facility: CLINIC | Age: 66
End: 2021-04-23

## 2021-04-23 ENCOUNTER — OFFICE VISIT (OUTPATIENT)
Dept: FAMILY MEDICINE | Facility: CLINIC | Age: 66
End: 2021-04-23
Payer: MEDICARE

## 2021-04-23 ENCOUNTER — NURSE TRIAGE (OUTPATIENT)
Dept: NURSING | Facility: CLINIC | Age: 66
End: 2021-04-23

## 2021-04-23 VITALS
HEART RATE: 96 BPM | SYSTOLIC BLOOD PRESSURE: 130 MMHG | BODY MASS INDEX: 26.37 KG/M2 | OXYGEN SATURATION: 99 % | WEIGHT: 168 LBS | DIASTOLIC BLOOD PRESSURE: 78 MMHG | TEMPERATURE: 98 F | HEIGHT: 67 IN

## 2021-04-23 DIAGNOSIS — H91.92 HEARING LOSS OF LEFT EAR, UNSPECIFIED HEARING LOSS TYPE: ICD-10-CM

## 2021-04-23 DIAGNOSIS — E11.9 TYPE 2 DIABETES MELLITUS WITHOUT COMPLICATION, WITHOUT LONG-TERM CURRENT USE OF INSULIN (H): ICD-10-CM

## 2021-04-23 DIAGNOSIS — E78.5 DYSLIPIDEMIA: ICD-10-CM

## 2021-04-23 DIAGNOSIS — R05.3 CHRONIC COUGH: Primary | ICD-10-CM

## 2021-04-23 DIAGNOSIS — I10 HYPERTENSION, UNSPECIFIED TYPE: ICD-10-CM

## 2021-04-23 DIAGNOSIS — Z12.5 SCREENING FOR PROSTATE CANCER: ICD-10-CM
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
## 2021-04-23 DIAGNOSIS — N13.8 BENIGN PROSTATIC HYPERPLASIA WITH URINARY OBSTRUCTION: ICD-10-CM

## 2021-04-23 DIAGNOSIS — R07.89 OTHER CHEST PAIN: ICD-10-CM

## 2021-04-23 DIAGNOSIS — N40.1 BENIGN PROSTATIC HYPERPLASIA WITH URINARY OBSTRUCTION: ICD-10-CM

## 2021-04-23 DIAGNOSIS — Z12.11 COLON CANCER SCREENING: ICD-10-CM

## 2021-04-23 DIAGNOSIS — R00.0 TACHYCARDIA: ICD-10-CM

## 2021-04-23 DIAGNOSIS — R05.3 CHRONIC COUGH: ICD-10-CM

## 2021-04-23 LAB
ALBUMIN SERPL-MCNC: 3.5 G/DL (ref 3.4–5)
ALP SERPL-CCNC: 53 U/L (ref 40–150)
ALT SERPL W P-5'-P-CCNC: 24 U/L (ref 0–70)
ANION GAP SERPL CALCULATED.3IONS-SCNC: 4 MMOL/L (ref 3–14)
AST SERPL W P-5'-P-CCNC: 12 U/L (ref 0–45)
BILIRUB SERPL-MCNC: 0.6 MG/DL (ref 0.2–1.3)
BUN SERPL-MCNC: 20 MG/DL (ref 7–30)
CALCIUM SERPL-MCNC: 8.7 MG/DL (ref 8.5–10.1)
CHLORIDE SERPL-SCNC: 106 MMOL/L (ref 94–109)
CHOLEST SERPL-MCNC: 188 MG/DL
CO2 SERPL-SCNC: 25 MMOL/L (ref 20–32)
CREAT SERPL-MCNC: 0.92 MG/DL (ref 0.66–1.25)
ERYTHROCYTE [DISTWIDTH] IN BLOOD BY AUTOMATED COUNT: 14.1 % (ref 10–15)
GFR SERPL CREATININE-BSD FRML MDRD: 87 ML/MIN/{1.73_M2}
GLUCOSE SERPL-MCNC: 111 MG/DL (ref 70–99)
HBA1C MFR BLD: 6.5 % (ref 0–5.6)
HCT VFR BLD AUTO: 38.9 % (ref 40–53)
HDLC SERPL-MCNC: 71 MG/DL
HGB BLD-MCNC: 13.3 G/DL (ref 13.3–17.7)
LDLC SERPL CALC-MCNC: 106 MG/DL
MCH RBC QN AUTO: 29.8 PG (ref 26.5–33)
MCHC RBC AUTO-ENTMCNC: 34.2 G/DL (ref 31.5–36.5)
MCV RBC AUTO: 87 FL (ref 78–100)
NONHDLC SERPL-MCNC: 117 MG/DL
PLATELET # BLD AUTO: 207 10E9/L (ref 150–450)
POTASSIUM SERPL-SCNC: 3.8 MMOL/L (ref 3.4–5.3)
PROT SERPL-MCNC: 7.5 G/DL (ref 6.8–8.8)
RBC # BLD AUTO: 4.46 10E12/L (ref 4.4–5.9)
SODIUM SERPL-SCNC: 135 MMOL/L (ref 133–144)
TRIGL SERPL-MCNC: 56 MG/DL
WBC # BLD AUTO: 6.1 10E9/L (ref 4–11)

## 2021-04-23 PROCEDURE — 80053 COMPREHEN METABOLIC PANEL: CPT | Performed by: INTERNAL MEDICINE

## 2021-04-23 PROCEDURE — 36415 COLL VENOUS BLD VENIPUNCTURE: CPT | Performed by: INTERNAL MEDICINE

## 2021-04-23 PROCEDURE — 93000 ELECTROCARDIOGRAM COMPLETE: CPT | Performed by: INTERNAL MEDICINE

## 2021-04-23 PROCEDURE — 99214 OFFICE O/P EST MOD 30 MIN: CPT | Performed by: INTERNAL MEDICINE

## 2021-04-23 PROCEDURE — 80061 LIPID PANEL: CPT | Performed by: INTERNAL MEDICINE

## 2021-04-23 PROCEDURE — 82043 UR ALBUMIN QUANTITATIVE: CPT | Performed by: INTERNAL MEDICINE

## 2021-04-23 PROCEDURE — 85027 COMPLETE CBC AUTOMATED: CPT | Performed by: INTERNAL MEDICINE

## 2021-04-23 PROCEDURE — G0103 PSA SCREENING: HCPCS | Performed by: INTERNAL MEDICINE

## 2021-04-23 PROCEDURE — 71046 X-RAY EXAM CHEST 2 VIEWS: CPT | Performed by: RADIOLOGY

## 2021-04-23 PROCEDURE — 83036 HEMOGLOBIN GLYCOSYLATED A1C: CPT | Performed by: INTERNAL MEDICINE

## 2021-04-23 RX ORDER — ROSUVASTATIN CALCIUM 10 MG/1
10 TABLET, COATED ORAL DAILY
Qty: 90 TABLET | Refills: 3 | Status: SHIPPED | OUTPATIENT
Start: 2021-04-23 | End: 2021-06-17

## 2021-04-23 RX ORDER — BLOOD-GLUCOSE METER
EACH MISCELLANEOUS
Qty: 1 KIT | Refills: 0 | Status: SHIPPED | OUTPATIENT
Start: 2021-04-23 | End: 2021-05-20

## 2021-04-23 RX ORDER — GUAIFENESIN 600 MG/1
1200 TABLET, EXTENDED RELEASE ORAL 2 TIMES DAILY
Qty: 28 TABLET | Refills: 0 | Status: SHIPPED | OUTPATIENT
Start: 2021-04-23 | End: 2021-05-20

## 2021-04-23 RX ORDER — TAMSULOSIN HYDROCHLORIDE 0.4 MG/1
0.4 CAPSULE ORAL
Qty: 180 CAPSULE | Refills: 0 | Status: SHIPPED | OUTPATIENT
Start: 2021-04-23 | End: 2021-12-22

## 2021-04-23 RX ORDER — LISINOPRIL 10 MG/1
10 TABLET ORAL DAILY
Qty: 90 TABLET | Refills: 1 | Status: SHIPPED | OUTPATIENT
Start: 2021-04-23 | End: 2022-05-12

## 2021-04-23 RX ORDER — GLIMEPIRIDE 1 MG/1
1 TABLET ORAL
Qty: 90 TABLET | Refills: 0 | Status: SHIPPED | OUTPATIENT
Start: 2021-04-23 | End: 2022-12-13

## 2021-04-23 RX ORDER — BENZONATATE 100 MG/1
100 CAPSULE ORAL 3 TIMES DAILY PRN
Qty: 60 CAPSULE | Refills: 0 | Status: SHIPPED | OUTPATIENT
Start: 2021-04-23 | End: 2021-06-17

## 2021-04-23 RX ORDER — ATENOLOL 25 MG/1
25 TABLET ORAL DAILY
Qty: 90 TABLET | Refills: 1 | Status: SHIPPED | OUTPATIENT
Start: 2021-04-23 | End: 2021-12-22

## 2021-04-23 ASSESSMENT — MIFFLIN-ST. JEOR: SCORE: 1505.67

## 2021-04-23 NOTE — TELEPHONE ENCOUNTER
Reason for Call:  Medication or medication refill:    Do you use a [facility]?  Name of the pharmacy and phone number for the current request:  [facility] [place], [place] - [address] [address]    Name of the medication requested: alt to: benzonatate (TESSALON) 100 MG capsule     Other request: Pharma called stating Rx was not covered by insurance and Pt does not want to pay for it    Can we leave a detailed message on this number? YES    Phone number pharma can be reached at:  [phone]    Phone number patient can be reached at: [phone]    Best Time: any    Call taken on 4/23/2021 at 11:09 AM by Carley Dudley

## 2021-04-23 NOTE — Clinical Note
Please abstract the following data from this visit with this patient into the appropriate field in Epic:    Tests that can be patient reported without a hard copy:    Eye exam with ophthalmology on this date: 4- at [facility] in [place].

## 2021-04-23 NOTE — LETTER
April 26, 2021      Jordan Miller  [address] [facility] [address]        Dear ,    We are writing to inform you of your test results.           Jordan, please be reassured your chest x-ray is normal, heart size is normal.   Dr. Wise          Resulted Orders   XR Chest 2 Views    Narrative    CHEST TWO VIEWS  4/23/2021 9:57 AM     HISTORY: 66-year-old patient with chronic cough.    COMPARISON: None.       Impression    IMPRESSION: Heart size is normal. No pleural effusion, pneumothorax,  or abnormal area of consolidation.     KIERA BRAND MD       If you have any questions or concerns, please call the clinic at the number listed above.       Sincerely,      Kristi Wise MD

## 2021-04-23 NOTE — LETTER
April 26, 2021      Jordan Paul  [address] [facility] [address]        Dear ,    We are writing to inform you of your test results.    PSA which is prostate-specific antigen is 0.5 which is within normal   Urine microalbumin is normal which means there is no spilling protein in the urine   Chemistry shows normal electrolytes, normal kidney function test, Normal calcium level, normal total protein albumin,  and normal liver enzymes.   Your lipid panel shows HDL which is good cholesterol at goal, LDL slightly increased at 106 and normal triglyceride 56.   Continue with lifestyle changes, low-fat low-cholesterol diet, increase exercise activity as tolerated; brisk walking 30 minutes 5 times a week.   Please continue on rosuvastatin cholesterol medication 10 mg 1 tablet once daily at bedtime   Test for diabetes HbA1c slight increase to 6.5, please continue on Metformin you may consider increasing the dose to 1 tablet twice a day with meals, are you currently taking Metformin half tablet twice a day?  Please if you could clarify, please continue with low carbs diet.     Please reassure EKG shows normal sinus rhythm, EKG looks fine for age.  Heart rate slightly elevated at 95 bpm   Any further questions please let me know   Dr. Wise     Resulted Orders   Lipid panel reflex to direct LDL Fasting   Result Value Ref Range    Cholesterol 188 <200 mg/dL    Triglycerides 56 <150 mg/dL      Comment:      Fasting specimen    HDL Cholesterol 71 >39 mg/dL    LDL Cholesterol Calculated 106 (H) <100 mg/dL      Comment:      Above desirable:  100-129 mg/dl  Borderline High:  130-159 mg/dL  High:             160-189 mg/dL  Very high:       >189 mg/dl      Non HDL Cholesterol 117 <130 mg/dL   Comprehensive metabolic panel (BMP + Alb, Alk Phos, ALT, AST, Total. Bili, TP)   Result Value Ref Range    Sodium 135 133 - 144 mmol/L    Potassium 3.8 3.4 - 5.3 mmol/L    Chloride 106 94 - 109 mmol/L    Carbon Dioxide 25 20 -  32 mmol/L    Anion Gap 4 3 - 14 mmol/L    Glucose 111 (H) 70 - 99 mg/dL      Comment:      Fasting specimen    Urea Nitrogen 20 7 - 30 mg/dL    Creatinine 0.92 0.66 - 1.25 mg/dL    GFR Estimate 87 >60 mL/min/[1.73_m2]      Comment:      Non  GFR Calc  Starting 12/18/2018, serum creatinine based estimated GFR (eGFR) will be   calculated using the Chronic Kidney Disease Epidemiology Collaboration   (CKD-EPI) equation.      GFR Estimate If Black >90 >60 mL/min/[1.73_m2]      Comment:       GFR Calc  Starting 12/18/2018, serum creatinine based estimated GFR (eGFR) will be   calculated using the Chronic Kidney Disease Epidemiology Collaboration   (CKD-EPI) equation.      Calcium 8.7 8.5 - 10.1 mg/dL    Bilirubin Total 0.6 0.2 - 1.3 mg/dL    Albumin 3.5 3.4 - 5.0 g/dL    Protein Total 7.5 6.8 - 8.8 g/dL    Alkaline Phosphatase 53 40 - 150 U/L    ALT 24 0 - 70 U/L    AST 12 0 - 45 U/L   Hemoglobin A1c   Result Value Ref Range    Hemoglobin A1C 6.5 (H) 0 - 5.6 %      Comment:      Normal <5.7% Prediabetes 5.7-6.4%  Diabetes 6.5% or higher - adopted from ADA   consensus guidelines.     CBC with platelets   Result Value Ref Range    WBC 6.1 4.0 - 11.0 10e9/L    RBC Count 4.46 4.4 - 5.9 10e12/L    Hemoglobin 13.3 13.3 - 17.7 g/dL    Hematocrit 38.9 (L) 40.0 - 53.0 %    MCV 87 78 - 100 fl    MCH 29.8 26.5 - 33.0 pg    MCHC 34.2 31.5 - 36.5 g/dL    RDW 14.1 10.0 - 15.0 %    Platelet Count 207 150 - 450 10e9/L   PSA, screen   Result Value Ref Range    PSA 0.50 0 - 4 ug/L      Comment:      Assay Method:  Chemiluminescence using Siemens Vista analyzer   Albumin Random Urine Quantitative with Creat Ratio   Result Value Ref Range    Creatinine Urine 163 mg/dL    Albumin Urine mg/L 17 mg/L    Albumin Urine mg/g Cr 10.43 0 - 17 mg/g Cr       If you have any questions or concerns, please call the clinic at the number listed above.       Sincerely,      Kristi Wise MD

## 2021-04-23 NOTE — PROGRESS NOTES
Assessment & Plan   Problem List Items Addressed This Visit        Endocrine    Type 2 diabetes mellitus without complication, without long-term current use of insulin (H)    Relevant Medications    blood glucose monitoring (ACCU-CHEK ANAMARIA PLUS) meter device kit    glimepiride (AMARYL) 1 MG tablet    metFORMIN (GLUCOPHAGE) 1000 MG tablet    Other Relevant Orders    Hemoglobin A1c    EYE ADULT REFERRAL    Albumin Random Urine Quantitative with Creat Ratio    Dyslipidemia    Relevant Medications    rosuvastatin (CRESTOR) 10 MG tablet    Other Relevant Orders    Lipid panel reflex to direct LDL Fasting       Circulatory    Hypertension, unspecified type    Relevant Medications    atenolol (TENORMIN) 25 MG tablet    lisinopril (ZESTRIL) 10 MG tablet    Other Relevant Orders    Comprehensive metabolic panel (BMP + Alb, Alk Phos, ALT, AST, Total. Bili, TP)       Urinary    Benign prostatic hyperplasia with urinary obstruction    Relevant Medications    tamsulosin (FLOMAX) 0.4 MG capsule      Other Visit Diagnoses     Chronic cough    -  Primary    Relevant Medications    benzonatate (TESSALON) 100 MG capsule    Other Relevant Orders    CBC with platelets    COVID-19 Vimal RBD Jasmin & Titer Reflex    XR Chest 2 Views    Colon cancer screening        Relevant Orders    Fecal colorectal cancer screen (FIT)    Screening for prostate cancer        Relevant Orders    PSA, screen    Other chest pain        Relevant Medications    lisinopril (ZESTRIL) 10 MG tablet    Tachycardia        Relevant Medications    atenolol (TENORMIN) 25 MG tablet    Other Relevant Orders    EKG 12-lead complete w/read - Clinics (Completed)    Fall, initial encounter        soreness at right buttoock, will observe, take prn tylenol,          Will start on tesalosan pearls 1 capsule 3 times a day for cough.  His pulse ox is 99% on room air.  He is not in any distress.  Slightly tachycardic advised him to restart atenolol in addition to lisinopril we  "will check labs first we will check an EKG baseline.  He could have had Covid anemia and is why he has persistent cough or could be a reaction to the lisinopril.  We can check a chest x-ray for him today also.  We will do basic labs lipid HbA1c screen refill of his medications.  As far as the pain in his buttocks area he has full range of motion of the head slight tenderness on deep palpation of the right buttocks.  We will continue closely monitor can take Tylenol as needed.  Continue on the same rest of medications on Crestor glimepiride Metformin baby aspirin daily.  Also he is on Flomax twice a day for BPH.  We will check a PSA level.  Advised him to do the fit test.  He states he had done a colonoscopy back in [place] 10 years ago and was normal as per patient no records available.  He has been doing a lot of walking also continue with lifestyle changes walking brisk walking 30 minutes 5 times a week and as tolerated.  We will check I will send to lab eye exam for diabetic eye exam we will send a glucometer kit also.  If the diabetes number is uncontrolled or elevated will refer him to diabetic educator.  All questions answered.         BMI:   Estimated body mass index is 26.31 kg/m  as calculated from the following:    Height as of this encounter: 1.702 m (5' 7\").    Weight as of this encounter: 76.2 kg (168 lb).   Weight management plan: Discussed healthy diet and exercise guidelines    CONSULTATION/REFERRAL to ophthalmology for diabetic eye exam.  Work on weight loss  Regular exercise  See Patient Instructions    No follow-ups on file.    Kristi Wise MD  [facility] [place] is a 66 year old who presents for the following health issues   HPI       Dry cough for 2 months, tested for COVID In April  15. Was negative    Has subsided,     Had COVID vaccine on 4/21    No SOB, no CP, no fever or chills,     No tingling or numbness    Had fallen from chair recently on his buttocks, " "no radiculopathy    Review of Systems   Constitutional, HEENT, cardiovascular, pulmonary, gi and gu systems are negative, except as otherwise noted.      Objective    /78   Pulse 96   Temp 98  F (36.7  C) (Temporal)   Ht 1.702 m (5' 7\")   Wt 76.2 kg (168 lb)   SpO2 99%   BMI 26.31 kg/m    Body mass index is 26.31 kg/m .  Physical Exam   GENERAL: healthy, alert and no distress  EYES: Eyes grossly normal to inspection, PERRL and conjunctivae and sclerae normal  NECK: no adenopathy, no asymmetry, masses, or scars and thyroid normal to palpation  RESP: lungs clear to auscultation - no rales, rhonchi or wheezes  CV: regular rate and rhythm, normal S1 S2, no S3 or S4, no murmur, click or rub, no peripheral edema and peripheral pulses strong  ABDOMEN: soft, nontender, no hepatosplenomegaly, no masses and bowel sounds normal  MS: no gross musculoskeletal defects noted, no edema  SKIN: no suspicious lesions or rashes  NEURO: Normal strength and tone, mentation intact and speech normal  PSYCH: mentation appears normal, affect normal/bright    Office Visit on 07/03/2019   Component Date Value Ref Range Status     Cholesterol 07/03/2019 158  <200 mg/dL Final     Triglycerides 07/03/2019 75  <150 mg/dL Final     HDL Cholesterol 07/03/2019 61  >39 mg/dL Final     LDL Cholesterol Calculated 07/03/2019 82  <100 mg/dL Final    Desirable:       <100 mg/dl     Non HDL Cholesterol 07/03/2019 97  <130 mg/dL Final     Sodium 07/03/2019 140  133 - 144 mmol/L Final     Potassium 07/03/2019 3.7  3.4 - 5.3 mmol/L Final     Chloride 07/03/2019 108  94 - 109 mmol/L Final     Carbon Dioxide 07/03/2019 24  20 - 32 mmol/L Final     Anion Gap 07/03/2019 8  3 - 14 mmol/L Final     Glucose 07/03/2019 116* 70 - 99 mg/dL Final     Urea Nitrogen 07/03/2019 15  7 - 30 mg/dL Final     Creatinine 07/03/2019 0.93  0.66 - 1.25 mg/dL Final     GFR Estimate 07/03/2019 87  >60 mL/min/[1.73_m2] Final    Comment: Non  GFR " Calc  Starting 12/18/2018, serum creatinine based estimated GFR (eGFR) will be   calculated using the Chronic Kidney Disease Epidemiology Collaboration   (CKD-EPI) equation.       GFR Estimate If Black 07/03/2019 >90  >60 mL/min/[1.73_m2] Final    Comment:  GFR Calc  Starting 12/18/2018, serum creatinine based estimated GFR (eGFR) will be   calculated using the Chronic Kidney Disease Epidemiology Collaboration   (CKD-EPI) equation.       Calcium 07/03/2019 8.8  8.5 - 10.1 mg/dL Final     Bilirubin Total 07/03/2019 0.4  0.2 - 1.3 mg/dL Final     Albumin 07/03/2019 3.6  3.4 - 5.0 g/dL Final     Protein Total 07/03/2019 7.3  6.8 - 8.8 g/dL Final     Alkaline Phosphatase 07/03/2019 59  40 - 150 U/L Final     ALT 07/03/2019 30  0 - 70 U/L Final     AST 07/03/2019 22  0 - 45 U/L Final     WBC 07/03/2019 6.6  4.0 - 11.0 10e9/L Final     RBC Count 07/03/2019 4.60  4.4 - 5.9 10e12/L Final     Hemoglobin 07/03/2019 13.4  13.3 - 17.7 g/dL Final     Hematocrit 07/03/2019 40.1  40.0 - 53.0 % Final     MCV 07/03/2019 87  78 - 100 fl Final     MCH 07/03/2019 29.1  26.5 - 33.0 pg Final     MCHC 07/03/2019 33.4  31.5 - 36.5 g/dL Final     RDW 07/03/2019 13.7  10.0 - 15.0 % Final     Platelet Count 07/03/2019 196  150 - 450 10e9/L Final     % Neutrophils 07/03/2019 49.2  % Final     % Lymphocytes 07/03/2019 37.5  % Final     % Monocytes 07/03/2019 9.3  % Final     % Eosinophils 07/03/2019 3.5  % Final     % Basophils 07/03/2019 0.5  % Final     Absolute Neutrophil 07/03/2019 3.3  1.6 - 8.3 10e9/L Final     Absolute Lymphocytes 07/03/2019 2.5  0.8 - 5.3 10e9/L Final     Absolute Monocytes 07/03/2019 0.6  0.0 - 1.3 10e9/L Final     Absolute Eosinophils 07/03/2019 0.2  0.0 - 0.7 10e9/L Final     Absolute Basophils 07/03/2019 0.0  0.0 - 0.2 10e9/L Final     Diff Method 07/03/2019 Automated Method   Final     PSA Free 07/03/2019 0.2  ng/mL Final     Prostate Specific Antigen 07/03/2019 0.7  0.0 - 4.0 ng/mL Final     Comment: (Note)  INTERPRETIVE INFORMATION: Prostate Specific Antigen  The Roche PSA electrochemiluminescent immunoassay was used.   Results obtained with different test methods or kits cannot   be used interchangeably. The Roche PSA method is approved   for use as an aid in the detection of prostate cancer when   used in conjunction with a digital rectal exam in men age   50 and older. The Roche PSA method is also indicated for   the serial measurement of PSA to aid in the prognosis and   management of prostate cancer patients. Elevated PSA   concentrations can only suggest the presence of prostate   cancer until biopsy is performed. PSA concentrations can   also be elevated in benign prostatic hyperplasia or   inflammatory conditions of the prostate. PSA is generally   not elevated in healthy men or men with non-prostatic   carcinoma.       PSAPCT 07/03/2019 29  % Final    Comment: (Note)  INTERPRETIVE INFORMATION: Prostate Specific Antigen, Free   Percentage  [facility] uses the Roche Free PSA electrochemiluminescent   immunoassay method in conjunction with the Roche PSA   electrochemiluminescent immunoassay method to determine the   free PSA percentage. Values obtained with different assay   methods should not be used interchangeably. The free PSA   percentage is an aid in distinguishing prostate cancer from   benign prostatic conditions in men age 50 and older with a   total PSA between 3 and 10 ng/mL and negative digital   rectal examination findings. Prostatic biopsy is required   for the diagnosis of cancer.  In patients with total PSA concentrations of 4-10 ng/mL,   the probability of finding prostate cancer on needle biopsy   by age in years is:  %fPSA               50-59    60-69    70 or older  0  - 10%            49%      58%      65%  11 - 18%            27%      34%      41%  19 - 25%            18%      24%      30%  Greater than 25%     9%      12%                                 16%  Other factors may  help determine the actual risk of   prostate cancer in individual patients.  Performed by [facility],  [address] [place] [address] [phone]  [email or web address], Jaden Izaguirre MD, Lab. Director       Hemoglobin A1C 07/03/2019 6.1* 0 - 5.6 % Final    Comment: Normal <5.7% Prediabetes 5.7-6.4%  Diabetes 6.5% or higher - adopted from ADA   consensus guidelines.

## 2021-04-23 NOTE — TELEPHONE ENCOUNTER
Patient is calling again requesting an alternative to the Tessalon 100 mg capsule. Patient states his insurance did not cover the medication prescribed. Preferred pharmacy: [facility] [place], [place] - [address] [facility].    Alejandrina Alves, RN/[facility]

## 2021-04-23 NOTE — TELEPHONE ENCOUNTER
Patient calling again regarding his medication request from earlier today. See previous telephone encounter.     Alejandrina Alves RN/[facility]

## 2021-04-24 DIAGNOSIS — M25.562 CHRONIC PAIN OF LEFT KNEE: Primary | ICD-10-CM

## 2021-04-24 DIAGNOSIS — G89.29 CHRONIC PAIN OF LEFT KNEE: Primary | ICD-10-CM

## 2021-04-24 LAB
CREAT UR-MCNC: 163 MG/DL
MICROALBUMIN UR-MCNC: 17 MG/L
MICROALBUMIN/CREAT UR: 10.43 MG/G CR (ref 0–17)
PSA SERPL-ACNC: 0.5 UG/L (ref 0–4)

## 2021-04-25 NOTE — RESULT ENCOUNTER NOTE
Jordan, reviewed your labs  PSA which is prostate-specific antigen is 0.5 which is within normal  Urine microalbumin is normal which means there is no spilling protein in the urine  Chemistry shows normal electrolytes, normal kidney function test, Normal calcium level, normal total protein albumin,  and normal liver enzymes.  Your lipid panel shows HDL which is good cholesterol at goal, LDL slightly increased at 106 and normal triglyceride 56.  Continue with lifestyle changes, low-fat low-cholesterol diet, increase exercise activity as tolerated; brisk walking 30 minutes 5 times a week.  Please continue on rosuvastatin cholesterol medication 10 mg 1 tablet once daily at bedtime  Test for diabetes HbA1c slight increase to 6.5, please continue on Metformin you may consider increasing the dose to 1 tablet twice a day with meals, are you currently taking Metformin half tablet twice a day?  Please if you could clarify, please continue with low carbs diet.  Any further questions please let me know  Dr. Wise

## 2021-04-26 ENCOUNTER — TELEPHONE (OUTPATIENT)
Dept: FAMILY MEDICINE | Facility: CLINIC | Age: 66
End: 2021-04-26

## 2021-04-26 ENCOUNTER — TRANSFERRED RECORDS (OUTPATIENT)
Dept: HEALTH INFORMATION MANAGEMENT | Facility: CLINIC | Age: 66
End: 2021-04-26

## 2021-04-26 DIAGNOSIS — Z12.11 COLON CANCER SCREENING: ICD-10-CM

## 2021-04-26 DIAGNOSIS — M17.0 PRIMARY OSTEOARTHRITIS OF BOTH KNEES: Primary | ICD-10-CM

## 2021-04-26 PROCEDURE — 82274 ASSAY TEST FOR BLOOD FECAL: CPT | Performed by: INTERNAL MEDICINE

## 2021-04-26 RX ORDER — CHOLECALCIFEROL (VITAMIN D3) 125 MCG
5000 CAPSULE ORAL
OUTPATIENT
Start: 2021-04-26

## 2021-04-26 RX ORDER — ACETAMINOPHEN 500 MG
500-1000 TABLET ORAL EVERY 6 HOURS PRN
Status: CANCELLED | OUTPATIENT
Start: 2021-04-26

## 2021-04-26 RX ORDER — ACETAMINOPHEN 325 MG/1
TABLET ORAL
Qty: 90 TABLET | Refills: 0 | Status: SHIPPED | OUTPATIENT
Start: 2021-04-26 | End: 2021-05-20

## 2021-04-26 RX ORDER — CHOLECALCIFEROL (VITAMIN D3) 50 MCG
1 TABLET ORAL DAILY
Qty: 90 TABLET | Refills: 1 | Status: SHIPPED | OUTPATIENT
Start: 2021-04-26 | End: 2021-06-15

## 2021-04-26 NOTE — TELEPHONE ENCOUNTER
Routing refill request to provider for review/approval because:  Drug not active on patient's medication list  Deepa Garces RN

## 2021-04-26 NOTE — TELEPHONE ENCOUNTER
During conversation with patient, he requested refill of medications.     Patient requests to have both rx for tylenol 650 mg and 500 mg. Routing to CS refill to review.

## 2021-04-26 NOTE — TELEPHONE ENCOUNTER
Patient Contact    Patient called back. RN relayed results below. He confirms he is taking metformin 500 mg BID. States they come in 500 mg tablets, not 1000 mg tablets.     Patient requests letter be sent to home or  in clinic. Per chart review, it was already sent to his home. Patient okay with this plan.

## 2021-04-26 NOTE — RESULT ENCOUNTER NOTE
Please reassure EKG shows normal sinus rhythm, EKG looks fine for age.  Heart rate slightly elevated at 95 bpm  Dr. Wise

## 2021-04-26 NOTE — TELEPHONE ENCOUNTER
Dr. Wise refilled Vit D at 2000  International unit(s) instead of 5000 international unit(s).   Pt to call clinic if has questions.     Acetaminophen also sent in.

## 2021-04-26 NOTE — TELEPHONE ENCOUNTER
----- Message from Kristi Wise MD sent at 4/24/2021 11:56 PM CDT -----  Jordan, reviewed your labs  PSA which is prostate-specific antigen is 0.5 which is within normal  Urine microalbumin is normal which means there is no spilling protein in the urine  Chemistry shows normal electrolytes, normal kidney function test, Normal calcium level, normal total protein albumin,  and normal liver enzymes.  Your lipid panel shows HDL which is good cholesterol at goal, LDL slightly increased at 106 and normal triglyceride 56.  Continue with lifestyle changes, low-fat low-cholesterol diet, increase exercise activity as tolerated; brisk walking 30 minutes 5 times a week.  Please continue on rosuvastatin cholesterol medication 10 mg 1 tablet once daily at bedtime  Test for diabetes HbA1c slight increase to 6.5, please continue on Metformin you may consider increasing the dose to 1 tablet twice a day with meals, are you currently taking Metformin half tablet twice a day?  Please if you could clarify, please continue with low carbs diet.  Any further questions please let me know  Dr. Wise    Please reassure EKG shows normal sinus rhythm, EKG looks fine for age.  Heart rate slightly elevated at 95 bpm   Dr. Wise     **Result letter has been mailed to patient**           Jordan, please be reassured your chest x-ray is normal, heart size is normal.   Dr. Wise

## 2021-04-26 NOTE — TELEPHONE ENCOUNTER
Patient Contact    Attempt # 1    Was call answered?  No - Left message asking patient to call back     Emily WILCOX RN

## 2021-04-26 NOTE — TELEPHONE ENCOUNTER
Maintenance dose of vitamin D3 is 1000 to 2000 units daily.  Vitamin D3 5000 units dose is not indicated unless patient has a vitamin D deficiency, we do not have any labs for vitamin D level.  If patient requestS higher dose of vitamin D3, he will need to do labs for vitamin D level and he would need to check with insurance if lab is covered.  High vitamin D dose can cause vitamin D toxicity, so high-dose vitamin D3 is not indicated unless vitamin D deficiency is documented by lab

## 2021-04-26 NOTE — LETTER
April 29, 2021      Jordan Miller  [address] [facility] [address]        Dear ,    We are writing to inform you of your test results.    Stool FIT test is negative, means no blood in stools, please reassure, recommendation for repeat in one year.   Dr Wise     Resulted Orders   Fecal colorectal cancer screen (FIT)   Result Value Ref Range    Occult Blood Scn FIT Negative NEG^Negative       If you have any questions or concerns, please call the clinic at the number listed above.       Sincerely,      Kristi Wise MD

## 2021-04-28 LAB — HEMOCCULT STL QL IA: NEGATIVE

## 2021-04-29 NOTE — RESULT ENCOUNTER NOTE
Stool FIT test is negative, means no blood in stools, please reassure, recommendation for repeat in one year.   Dr Wise

## 2021-05-20 ENCOUNTER — TRANSFERRED RECORDS (OUTPATIENT)
Dept: HEALTH INFORMATION MANAGEMENT | Facility: CLINIC | Age: 66
End: 2021-05-20

## 2021-05-20 LAB — RETINOPATHY: NEGATIVE

## 2021-05-27 ENCOUNTER — TELEPHONE (OUTPATIENT)
Dept: FAMILY MEDICINE | Facility: CLINIC | Age: 66
End: 2021-05-27

## 2021-06-11 ENCOUNTER — OFFICE VISIT (OUTPATIENT)
Dept: FAMILY MEDICINE | Facility: CLINIC | Age: 66
End: 2021-06-11
Payer: MEDICARE

## 2021-06-11 VITALS
HEART RATE: 69 BPM | TEMPERATURE: 97.4 F | SYSTOLIC BLOOD PRESSURE: 113 MMHG | DIASTOLIC BLOOD PRESSURE: 62 MMHG | HEIGHT: 67 IN | OXYGEN SATURATION: 96 % | WEIGHT: 175 LBS | BODY MASS INDEX: 27.47 KG/M2

## 2021-06-11 DIAGNOSIS — E55.9 VITAMIN D DEFICIENCY: ICD-10-CM

## 2021-06-11 DIAGNOSIS — L29.9 SCALP ITCH: ICD-10-CM

## 2021-06-11 DIAGNOSIS — I10 HYPERTENSION, UNSPECIFIED TYPE: ICD-10-CM

## 2021-06-11 DIAGNOSIS — R41.3 MEMORY DIFFICULTIES: Primary | ICD-10-CM

## 2021-06-11 LAB
HBA1C MFR BLD: 6.4 % (ref 0–5.6)
TSH SERPL DL<=0.005 MIU/L-ACNC: 0.77 MU/L (ref 0.4–4)
VIT B12 SERPL-MCNC: 247 PG/ML (ref 193–986)

## 2021-06-11 PROCEDURE — 83036 HEMOGLOBIN GLYCOSYLATED A1C: CPT | Performed by: INTERNAL MEDICINE

## 2021-06-11 PROCEDURE — 82607 VITAMIN B-12: CPT | Performed by: INTERNAL MEDICINE

## 2021-06-11 PROCEDURE — 36415 COLL VENOUS BLD VENIPUNCTURE: CPT | Performed by: INTERNAL MEDICINE

## 2021-06-11 PROCEDURE — 99214 OFFICE O/P EST MOD 30 MIN: CPT | Performed by: INTERNAL MEDICINE

## 2021-06-11 PROCEDURE — 84443 ASSAY THYROID STIM HORMONE: CPT | Performed by: INTERNAL MEDICINE

## 2021-06-11 PROCEDURE — 82306 VITAMIN D 25 HYDROXY: CPT | Performed by: INTERNAL MEDICINE

## 2021-06-11 ASSESSMENT — MIFFLIN-ST. JEOR: SCORE: 1537.42

## 2021-06-11 NOTE — PROGRESS NOTES
"    Assessment & Plan   Problem List Items Addressed This Visit        Circulatory    Hypertension, unspecified type    Relevant Orders    Home Blood Pressure Monitor Order for DME - ONLY FOR DME      Other Visit Diagnoses     Memory difficulties    -  Primary    Relevant Orders    Vitamin B12 (Completed)    Hemoglobin A1c (Completed)    TSH with free T4 reflex (Completed)    MEMORY CLINIC REFERRAL    Scalp itch        Relevant Orders    ADULT DERMATOLOGY REFERRAL    Vitamin D deficiency        Relevant Orders    Vitamin D Deficiency (Completed)      Jordan has multiple concerns.  He has some scalp itching, advised to rinse his hair after he washes very well with water, use minimal shampoo change shampoo he is using.    He describes that he cannot sweat much especially if he exercises.  We will check thyroid test.    He has some memory lapse, especially when he puts the phone or TV he forgets where he putts. Reports such memory lapses has become a problem for him, we will have him see memory clinic.  We will check TSH, B12 level ,advised him on measures to do; keep a log of where he puts things,  \"increase exercise activities, make sure getting adequate sleep hygiene, Eat Less Added Sugar.,Try a Fish Oil Supplement. .,Make Time for Meditation. ...Maintain a Healthy Weight. Practice Mindfulness. Train Your Brain, cut on alcohol intake..\"    He describes some snoring and nocturia twice denies feeling tired next day or somnolent.  Not known to have JAIRO in the past.    He also describes that he occasionally has some dizziness spells ,advised him to monitor his blood pressure; gave DME prescription for blood pressure machine    We will check B12 and vitamin D level.    Patient in agreement to see memory clinic and dermatology.    Patient states he uses Flomax 2 tablets; 1 in the morning 1 at night; advised that can cause orthostatic dizziness as well.  He wants to stay on same dose for now.    He follows with " "endocrinology for diabetes management.  He reports his sugars are in the 120s.  Denies any low sugar readings.    He does a lot of walking 4000-- 5000 steps a day.  Denies any dyspnea or chest pain at rest or with exertion.  Wants to know if he can do physical exertion above average, advised him to try to increase his activities as much as tolerated.         CONSULTATION/REFERRAL to patient dermatology and memory clinic.    Work on weight loss  Regular exercise  See Patient Instructions    No follow-ups on file.    Kristi Wise MD  [facility] [place] is a 66 year old who presents for the following health issues   HPI     Patient presenting to discuss multiple concerns mainly memory lapses over the last couple months.  Also scalp itching, inability to sweat when he exercises  Occasional dizziness,      Review of Systems   Constitutional, HEENT, cardiovascular, pulmonary, gi and gu systems are negative, except as otherwise noted.      Objective    /62   Pulse 69   Temp 97.4  F (36.3  C) (Temporal)   Ht 1.702 m (5' 7\")   Wt 79.4 kg (175 lb)   SpO2 96%   BMI 27.41 kg/m    Body mass index is 27.41 kg/m .  Physical Exam   General appearance not in acute distress, alert oriented x3, intact cranial nerves  Mucosa pink and moist  Neck supple, no mass  Lung clear to auscultation  Heart regular rate and rhythm, no murmur  Abdomen, soft  Neurologic grossly intact, no focal deficit, no tremor  Negative Romberg test.      Orders Only on 04/26/2021   Component Date Value Ref Range Status     Occult Blood Scn FIT 04/26/2021 Negative  NEG^Negative Final               "

## 2021-06-13 NOTE — RESULT ENCOUNTER NOTE
Jordan, reviewed your labs  Vitamin B12 is 247 on the low side.  Please take vitamin B12 over-the-counter 1000 mcg 1 tablet or capsule every other day.  Also advised that you take fish oil omega-3 fatty acid 1 to 2 capsules daily that may help with memory lapses.  Thyroid test called TSH is normal.  Diabetes test called HbA1c 6.4 stable.  Continue follow-up with endocrinology.  Vitamin D level is still pending  Please schedule follow-up with memory specialist as advised in the clinic.  Dr. Wise

## 2021-06-14 ENCOUNTER — TELEPHONE (OUTPATIENT)
Dept: FAMILY MEDICINE | Facility: CLINIC | Age: 66
End: 2021-06-14

## 2021-06-14 LAB — DEPRECATED CALCIDIOL+CALCIFEROL SERPL-MC: 37 UG/L (ref 20–75)

## 2021-06-14 NOTE — TELEPHONE ENCOUNTER
Kristi Wise MD  [facility]             Jordan, reviewed your labs   Vitamin B12 is 247 on the low side.  Please take vitamin B12 over-the-counter 1000 mcg 1 tablet or capsule every other day.   Also advised that you take fish oil omega-3 fatty acid 1 to 2 capsules daily that may help with memory lapses.   Thyroid test called TSH is normal.   Diabetes test called HbA1c 6.4 stable.  Continue follow-up with endocrinology.   Vitamin D level is still pending   Please schedule follow-up with memory specialist as advised in the clinic.   Dr. Wise

## 2021-06-15 ENCOUNTER — TELEPHONE (OUTPATIENT)
Dept: FAMILY MEDICINE | Facility: CLINIC | Age: 66
End: 2021-06-15

## 2021-06-15 DIAGNOSIS — E11.9 TYPE 2 DIABETES MELLITUS WITHOUT COMPLICATION, WITHOUT LONG-TERM CURRENT USE OF INSULIN (H): Primary | ICD-10-CM

## 2021-06-15 DIAGNOSIS — M17.0 PRIMARY OSTEOARTHRITIS OF BOTH KNEES: ICD-10-CM

## 2021-06-15 DIAGNOSIS — R79.89 LOW VITAMIN D LEVEL: ICD-10-CM

## 2021-06-15 DIAGNOSIS — E78.5 DYSLIPIDEMIA: ICD-10-CM

## 2021-06-15 DIAGNOSIS — R79.89 LOW VITAMIN B12 LEVEL: ICD-10-CM

## 2021-06-15 RX ORDER — METAPROTERENOL SULFATE 10 MG
500-1000 TABLET ORAL DAILY
Qty: 180 CAPSULE | Refills: 1 | Status: SHIPPED | OUTPATIENT
Start: 2021-06-15 | End: 2022-05-12

## 2021-06-15 RX ORDER — LANOLIN ALCOHOL/MO/W.PET/CERES
1000 CREAM (GRAM) TOPICAL EVERY OTHER DAY
Qty: 90 TABLET | Refills: 0 | Status: SHIPPED | OUTPATIENT
Start: 2021-06-15 | End: 2021-09-24

## 2021-06-15 RX ORDER — CHOLECALCIFEROL (VITAMIN D3) 50 MCG
2 TABLET ORAL DAILY
Qty: 180 TABLET | Refills: 0 | Status: SHIPPED | OUTPATIENT
Start: 2021-06-15 | End: 2021-11-19

## 2021-06-15 NOTE — TELEPHONE ENCOUNTER
Printed patients requested lab. I placed lab results in to be mail bin.       Dutch Yin CMA on 6/15/2021 at 2:18 PM

## 2021-06-15 NOTE — TELEPHONE ENCOUNTER
Patient called and requested that a copy of his 6/11/21 lab results be mailed to his home address. Verified that home address is current. Please mail.

## 2021-06-15 NOTE — TELEPHONE ENCOUNTER
Called patient and discussed lab results     Pt asking if PCP can order the recommended supplements to pharmacy (pended)     Also states he checks the blood sugars and it seems to be inconsistent/all over the place - can he meet with diabetes educator to make sure he is checking correctly? Would need referral   Checked now 54  Recheck 79, 152   Recheck 80, 144      [facility] in [place] is pharmacy     Okay to leave a detailed message if no answer on call back     Kristi Wise MD   6/15/2021  9:17 AM CDT      Please notify patient of the following lab results   Jordan,  your vitamin D level is on the low side at 37, please take vitamin D 3 supplements over-the-counter 4000 units once daily, that is 2 of the 2000 units capsules.  Kristi Velez MD   6/13/2021  5:14 PM CDT      Jordan, reviewed your labs  Vitamin B12 is 247 on the low side.  Please take vitamin B12 over-the-counter 1000 mcg 1 tablet or capsule every other day.  Also advised that you take fish oil omega-3 fatty acid 1 to 2 capsules daily that may help with memory lapses.  Thyroid test called TSH is normal.  Diabetes test called HbA1c 6.4 stable.  Continue follow-up with endocrinology.  Vitamin D level is still pending  Please schedule follow-up with memory specialist as advised in the clinic.  Dr. Wise

## 2021-06-15 NOTE — RESULT ENCOUNTER NOTE
Please notify patient of the following lab results   Jordan,  your vitamin D level is on the low side at 37, please take vitamin D 3 supplements over-the-counter 4000 units once daily, that is 2 of the 2000 units capsules.  Dr. Wise

## 2021-06-17 ENCOUNTER — TELEPHONE (OUTPATIENT)
Dept: FAMILY MEDICINE | Facility: CLINIC | Age: 66
End: 2021-06-17

## 2021-06-17 ENCOUNTER — RECORDS - HEALTHEAST (OUTPATIENT)
Dept: ADMINISTRATIVE | Facility: OTHER | Age: 66
End: 2021-06-17

## 2021-06-17 ENCOUNTER — VIRTUAL VISIT (OUTPATIENT)
Dept: FAMILY MEDICINE | Facility: CLINIC | Age: 66
End: 2021-06-17
Payer: MEDICAID

## 2021-06-17 DIAGNOSIS — R41.3 MEMORY LOSS: Primary | ICD-10-CM

## 2021-06-17 PROCEDURE — 99443 PR PHYSICIAN TELEPHONE EVALUATION 21-30 MIN: CPT | Performed by: INTERNAL MEDICINE

## 2021-06-17 NOTE — PROGRESS NOTES
"Jordan is a 66 year old who is being evaluated via a billable telephone visit.      What phone number would you like to be contacted at? [phone]  How would you like to obtain your AVS? Mail a copy; completed    MEMORY EVALUATION CLINIC - INITIAL EVALUATION    HPI: Mr. Jordan Miller is a 66yoM participating in this telephone initial consultation independently today. He was \"stuck\" in [place] during COVID-19 pandemic and just returned to [place] in late April. Was with his 105 yo father during this time as wife remained in [place]. When he returned his daughter voiced concerns with his memory which he'd thought was otherwise maybe d/t normal changes with aging. He agreed to investigate this further. He describes memory loss of misplacing things, forgetting people's names, difficulty remembering passwords. He does remember that PCP diagnosed borderline-low Vitamin B12 recently and he hasn't yet picked up supplement as it was just prescribed. We talk about further work up with cognitive testing and MRI and he doesn't feel he needs family to accompany him as \"its just the early stages\".    He self-stopped his aspirin and Crestor as feels given A1c under control and his exercise routine that he wouldn't need them. I suggested he discuss with PCP. He is walking several miles per day but sweating minimally even in hot days (does sweat some though) and doesn't know why. Also prides himself in stretches and yoga daily.      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 Not currently working during pandemic. Exercises daily with walks up to 6 miles and yoga/stretching.  PMH: Reviewed. Most notable for: DM2, HTN, HLP  Is there a h/o delirium, CVA/TIA, TBI, substances, parkinsonism?: None  Family Hx: None; father still living at 105 years old! Mother " lived til she was 95 years old.  Advanced directive: Not assessed today    ADLs: Independent  Driving: Denies difficulty  Finances: Independent  Shopping/housekeeping/meal prep: Not assessed today  Medications: Independent  Home situation: With family  Support: Family  Behaviors/Hallucinations/Delusions: Not assessed today      INVESTIGATIONS: Normal TSH; recently slightly low B12 at 247 on Metformin therapy and Rx B12 supplement. No head imaging on file.    PRIOR TESTING: None    COGNITIVE MEDICATIONS: None      ASSESSMENT/PLAN: Mr. Jordan Miller is a 66yoM with some mild memory changes concerning to him and his family willing to have further evaluation. Discussed starting with brain MRI (he says had MRI in the distant past (?location) but no known claustrophobia or metal/shrapnel) and occupational therapy cognitive evaluation followed by in person clinic visit to further discuss results and next steps. He will also be starting B12 supplement. He was in agreement with plan and AVS mailed to him per his request.    Patient Instructions   1) It was nice speaking with you on the phone today    2) Please call to schedule your MRI of the brain at [facility]: [phone]    3) Please call to schedule baseline cognitive testing through [facility] occupational therapy department (several metro locations):  [phone]     4) Please follow up with me after the above is completed to review.  Call to schedule your follow up appointment: # [phone]  [facility]  [address]  [address]  [place] [address]      5) Please start the vitamin supplements Dr. Wise recommended. I did take the aspirin and Crestor off of your medication list since you had discontinued them but please do discuss with Dr. Wise at your next visit as he may want you to consider continuing to take these medications.      Phone call duration: 36 minutes (1:28 PM - 2:04 PM)      Thank you for this  consultation!      47 minutes spent on the date of the encounter doing chart review, history, counseling, test ordering, documentation and further activities as noted above      Marline Plummer [facility]

## 2021-06-17 NOTE — Clinical Note
NOMI, thanks for the referral! Unfortunately he stopped taking his ASA and Crestor and I urged him to reconsider but he seemed set on not taking them. I told him to discuss with you in further follow up.

## 2021-06-17 NOTE — PATIENT INSTRUCTIONS
1) It was nice speaking with you on the phone today    2) Please call to schedule your MRI of the brain at [facility]: [phone]    3) Please call to schedule baseline cognitive testing through [facility] occupational therapy department ([facility]):  [phone]     4) Please follow up with me after the above is completed to review.  Call to schedule your follow up appointment: # [phone]  [facility]  [address]  [address]  [place] [address]      5) Please start the vitamin supplements Dr. Wise recommended. I did take the aspirin and Crestor off of your medication list since you had discontinued them but please do discuss with Dr. Wise at your next visit as he may want you to consider continuing to take these medications.

## 2021-06-20 ENCOUNTER — TELEPHONE (OUTPATIENT)
Dept: FAMILY MEDICINE | Facility: CLINIC | Age: 66
End: 2021-06-20

## 2021-06-20 DIAGNOSIS — E78.5 DYSLIPIDEMIA: Primary | ICD-10-CM

## 2021-06-20 NOTE — TELEPHONE ENCOUNTER
Preet Wise, patient has been given a prescription for fish oil 500mg bid. Patient's insurance only covers 1000mg capsules. Patient is requesting a new rx to get his medication covered. Thank you!    Michael Newman  Pharmacy Technician   [facility]  Phone Number: [phone]

## 2021-06-21 RX ORDER — OMEGA-3S/DHA/EPA/FISH OIL/D3 300MG-1000
1 CAPSULE ORAL 2 TIMES DAILY
Qty: 180 CAPSULE | Refills: 0 | Status: SHIPPED | OUTPATIENT
Start: 2021-06-21 | End: 2022-12-13

## 2021-06-28 ENCOUNTER — HOSPITAL ENCOUNTER (OUTPATIENT)
Dept: MRI IMAGING | Facility: CLINIC | Age: 66
Discharge: HOME | End: 2021-06-28
Payer: MEDICAID

## 2021-06-28 DIAGNOSIS — R41.3 MEMORY LOSS: ICD-10-CM

## 2021-07-08 ENCOUNTER — PATIENT OUTREACH (OUTPATIENT)
Dept: EDUCATION SERVICES | Facility: CLINIC | Age: 66
End: 2021-07-08
Attending: INTERNAL MEDICINE
Payer: MEDICAID

## 2021-07-08 ENCOUNTER — HOSPITAL ENCOUNTER (OUTPATIENT)
Dept: OCCUPATIONAL THERAPY | Facility: CLINIC | Age: 66
Setting detail: THERAPIES SERIES
End: 2021-07-08
Attending: INTERNAL MEDICINE
Payer: MEDICARE

## 2021-07-08 DIAGNOSIS — R41.3 MEMORY LOSS: ICD-10-CM

## 2021-07-08 DIAGNOSIS — E11.9 TYPE 2 DIABETES MELLITUS WITHOUT COMPLICATION, WITHOUT LONG-TERM CURRENT USE OF INSULIN (H): ICD-10-CM

## 2021-07-08 PROCEDURE — 98967 PH1 ASSMT&MGMT NQHP 11-20: CPT | Mod: 95

## 2021-07-08 PROCEDURE — 96125 COGNITIVE TEST BY HC PRO: CPT | Mod: GO | Performed by: OCCUPATIONAL THERAPIST

## 2021-07-08 NOTE — PROGRESS NOTES
"Standardized Cognitive Performance Testing  Cognitive Performance Test and MOCA    SUMMARY OF TEST:    The Cognitive Performance Test (CPT) is a standardized performance-based assessment to measure working memory/executive function processing capacities that underlie functional performance. Subtasks include common basic and instrumental activities of daily living (ADL/IADL) which are rated based on the manner in which patients respond to task demands of varying complexity. The total CPT score describes a level of functioning that indicates how information is processed, implications for functional activities, potential safety risks and a recommended level of supervision or assist based on cognitive function. The highest total score on this test is 5.6.    DATE OF TESTIN21    Occupational Profile (including diagnosis and medical history):Pt is a 67 yo man referred by Marline Plummer DO for eval and treat  In OT and for MOCA +/- CPT on 21. He presents with changes in memory as noted by daughter when he returned from [facility] where he was visiting his 105 year father and was \"stuck\" during the Covid 19 pandemic for 1 year and 4 months.Per chart: \"He describes memory loss of misplacing things, forgetting people's names, difficulty remembering passwords.\" MRI was ordered by Marline Plummer and per review of results : \"Mild generalized atrophy without convincing evidence of a dementia-specific volume loss pattern.\"  PMHx: DM, HTN, OA of knees.  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 Not currently working during pandemic. Exercises daily with walks up to 6 miles and yoga/stretching.\"    Wears glasses  Previous cognitive screens completed in OT or by Physician and score: none seen in chart    Functional History " "Interview:    Informants: self; drove self to therapy today. Pt notes he speaks english as a second language and is concerned that he may not always fully understand things as he leanred \"[place] english while in [place] growing up.\"     Client s perception of memory/ thinking or functional difficulties: I may put my watch somewhere and then not find it until quite a while later. Notes he forgets names of his niece for example.      Living situation: Lives with dtr, wife and son-in-law and grandchild     Home maintenance activities: mows lawn     Meal preparation and grocery shopping: wife and dtr do meals and at times pt does cook also.  Sometimes in [place] on 2-3 occasions he noted he didn't shut off the stove when heating water and leaving the home.     Finances and paying bills: Does finances and denies problems     Medication management: two times per day meds and denies problems with remembering these, reports he uses a pillbox     Driving and transportation: Drives and notes no problems and denies accidents     Leisure and routine activities: denies hobbies but does exercise, walking     ADL/Mobility/Physical Functioning: Ind per report; no problems with falls per pt    Fall Risk Screen:   Has the patient fallen 2 or more times in the last year? No      Has the patient fallen and had an injury in the past year? No       Timed Up and Go Score: not indicated    Is the patient a fall risk? No    Acworth Cognitive Assessment (MOCA): 18/30  (only area of cultural bias may have been with rhinocerous that pt states he would not know the name of-stated \"bull\" which would give score of 18/29) Visuospatial/Executive: errors with trailmaking putting all numbers on clock and labeling hands on clock; Attention: error on reverse 3 digit recall stating it forward first; 1 error with serial 7 subtraction; Language: 0/2 on sentence repetition; Abstraction: 0/2; Delayed recall 2/5     RESULTS OF TESTING:                          "                                                                    CPT Subtest Results    MEDBOX: 4.5/6 SHOP/GLOVES: 4.0/6 PHONE: 4.0/6   WASH:  4.5/5 TRAVEL: 6.0/6 TOAST: 4.0/5   DRESS: 5.0/5   TOTAL CPT SCORE:  32/39     Average CPT Score  4.6/5.6    INTERPRETATION OF TEST RESULTS:    Based on the Cognitive Performance Test, this patient scored at CPT Level 4.5.  See CPT Levels reference below.    Summary of functional cognitive status:   Pt demonstrating episodic and semantic memory impairments limiting executive control. There may have been some language bias but directions were provided slowly, at times in more simplified language to accommodate this, but despite this pt did demonstrate challenges with processing multi-step directives, attending to visual cues (ie asking for toaster when it was in front of him on Toast subtest; needing gloves in pile pointed out despite verbal cue to direct pt to look at other gloves of shop Subtest). He needed visual cue to actually go to the sink to wash his hands after repetition of instructions x 2 to do so on Wash subtest.    Factors affecting performance:  Possible cultural/language barrier    Recommendations:    Consider behind the wheel assessment as CPT scores of less than 4.7 are correlated with higher driving related crash incidences  Supervision for ADL/IADL:  Meal preparation, Shopping, Finances and Medication management  Supervision in living setting:  Daily checks                                                     Pt did sign GOLDY for therapist to speak with his son or dtr should they call with questions about the CPT or MOCA assessments completed today/      TIME ADMINISTERING TEST: 65 min    TIME FOR INTERPRETATION AND PREPARATION OF REPORT: 30 min    TOTAL TIME: 95 min      CPT Levels Reference:    Patient's Average CPT Score:  4.6                                                                                                                                     "              Individual scores range along a continuum as outlined below.  In addition to cognitive status, other factors may affect safety in a home environment.  Please refer to specific recommendations for this patient.    ___5.6-5.8  Normal functioning (absence of cognitive-functional disability).  Independent in managing personal affairs, monitors and directs own behavior.  Uses complex information to carry out daily activities with safety and accuracy.    Proficient with instrumental activities of daily living (IADL) and learning new activity.  Problems are anticipated, errors are avoided, and consequences of actions are considered.      ___5.0   Mild cognitive-functional disability; deficits in working memory and executive thought processes. Difficulty using complex information. Problems may be observed with recent memory, judgment, reasoning and planning ahead. May be impulsive or have difficulty anticipating consequences.  Safety:  May require assistance to plan ahead; or to manage complex medication schedules, appointments or finances.  Hazardous activities may need to be monitored or limited.  ADL:  Mild functional decline.  Able to complete basic self-care and routine household tasks.  May have difficulty with complex daily tasks such as reading, writing, meal preparation, shopping or driving.   Learns through hands on teaching. Self-centered behavior or difficulty considering the needs of others may be seen related to trouble seeing the  whole picture\". Can appear disorganized or uninhibited.    __X_4.5  Mild to moderate cognitive-functional disability. Significant deficits in working memory and executive thought processes. Judgment, reasoning and planning show obvious impairment.  Distractible with inability to shift attention/actions given competing stimuli.  Difficulty with problem solving and managing details. Complex daily tasks performed with inconsistency, difficulty, or error.     Safety:  " Medications should be monitored, stove use may require supervision, and driving ability may be affected.  Impaired safety awareness with inability to anticipate potential problems.  May not recognize or respond to emergent situations. Requires frequent check-in support.   ADL:  Mild difficulty with simple everyday self-care tasks. Benefits from structured, routine activity.  Will likely need reminders to complete tasks outside of the routine. Requires assistance with planning and IADL tasks like shopping and finances. Learns concrete tasks through repetition, but performance may not generalize. Tends to be impulsive with poor insight. Self centered behavior or inability to consider the needs of others is common.    ___4.0  Moderate cognitive-functional disability; abstract to concrete thought processes. Working memory and executive function impairments are obvious. Difficulty with planning and problem solving.  Behavior is goal-directed, but unable to follow multi-step directions, is easily distracted, and may not recognize mistakes.  Inability to anticipate hazards or understand precautions.  Safety:  Recommend 24-hour supervision for safety. Supervision needed for medication management and for hazardous activities. May not be able to follow a restricted diet. Can get lost in unfamiliar surroundings. Generally, persons functioning at level 4 should not be driving.   ADL:  Some decline in quality or frequency of ADL.  [place] enhanced by use of a routine, simple concrete directions, and caregiver set-up of needed items. Complex tasks such as money or home management typically requires assistance.  Relies heavily on vision to guide behavior; will ignore objects/hazards not in plain sight and can be distracted by irrelevant objects. Often has poor insight.  Able to carry out social conversation and may verbally  cover  for deficits leading caregivers to believe they are capable of functioning independently.        ___3.5  Moderate cognitive-functional disability; increased cues needed for task completion. Aware of concrete task steps but needs prompting or cues to initiate and complete simple tasks. Attention span is limited, simple directions may need to be repeated, and re-focus to a topic or task may be required.  Safety:  24-hour supervision required for safety and for assistance with daily tasks. Assistance required with medications, and access to medication should be limited. Meals, nutrition and dietary restrictions need to be monitored.  All hazardous activities should be restricted or supervised. Should not drive. Prone to wandering and can become lost.  ADL:  Moderate functional decline. Familiar tasks usually requires set-up of supplies and directions to complete steps. May need objects handed to them for task initiation. Function best with a set schedule in familiar surroundings with familiar people. All complex tasks must be done by others. Vocabulary is diminished and speech often unfocused.

## 2021-07-08 NOTE — PROGRESS NOTES
"    Diabetes Self-Management Education & Support    Presents for: Individual review  Type of Service: Telephone Visit    How would patient like to obtain AVS? Not needed today        SUBJECTIVE/OBJECTIVE:  Presents for: Individual review  Accompanied by: Self  Diabetes education in the past 24mo: No   Focus of Visit: Patient Unsure  Diabetes type: Type 2  Date of diagnosis: 16-17 years  Disease course: Stable  How confident are you filling out medical forms by yourself:: Not Assessed  Diabetes management related comments/concerns: I know how to eat and manages himself. BP is low when he takes the glimepiride.  Difficulty affording diabetes medication?: No  Difficulty affording diabetes testing supplies?: No  Other concerns:: English as a second language  Cultural Influences/Ethnic Background:  Not  or     Diabetes Symptoms & Complications:  Weight trend: Decreasing(intentionally last couple years)  Complications assessed today?: Yes  Autonomic neuropathy: No  CVA: No  Heart disease: No  Nephropathy: No  Peripheral neuropathy: No  Peripheral Vascular Disease: No  Retinopathy: No  Sexual dysfunction: No    Patient Problem List and Family Medical History reviewed for relevant medical history, current medical status, and diabetes risk factors.    Vitals:  There were no vitals taken for this visit.  Estimated body mass index is 27.41 kg/m  as calculated from the following:    Height as of 6/11/21: 1.702 m (5' 7\").    Weight as of 6/11/21: 79.4 kg (175 lb).   Last 3 BP:   BP Readings from Last 3 Encounters:   06/11/21 113/62   04/23/21 130/78   11/11/19 135/73       History   Smoking Status     Never Smoker   Smokeless Tobacco     Never Used       Labs:  Lab Results   Component Value Date    A1C 6.4 06/11/2021     Lab Results   Component Value Date     04/23/2021     Lab Results   Component Value Date     04/23/2021     HDL Cholesterol   Date Value Ref Range Status   04/23/2021 71 >39 mg/dL " Final   ]  GFR Estimate   Date Value Ref Range Status   04/23/2021 87 >60 mL/min/[1.73_m2] Final     Comment:     Non  GFR Calc  Starting 12/18/2018, serum creatinine based estimated GFR (eGFR) will be   calculated using the Chronic Kidney Disease Epidemiology Collaboration   (CKD-EPI) equation.       GFR Estimate If Black   Date Value Ref Range Status   04/23/2021 >90 >60 mL/min/[1.73_m2] Final     Comment:      GFR Calc  Starting 12/18/2018, serum creatinine based estimated GFR (eGFR) will be   calculated using the Chronic Kidney Disease Epidemiology Collaboration   (CKD-EPI) equation.       Lab Results   Component Value Date    CR 0.92 04/23/2021     No results found for: MICROALBUMIN    Healthy Eating:  Healthy Eating Assessed Today: Yes  Meals include: Breakfast, Lunch, Dinner  Beverages: Water, Tea(alcohol once in a while)  Has patient met with a dietitian in the past?: No    Fasts for 14 hours overnight. Then eats breakfast (10-15 almonds, eggs). Lunch is yogurt/fruit, vegetables, 1-2 roti. Dinner is often a salad and meat. Sometimes has roti with dinner. Eats fruit often.       Being Active:  Being Active Assessed Today: Yes  Exercise:: Yes  Days per week of moderate to strenuous exercise (like a brisk walk): 7  Barrier to exercise: None    Walking 6 miles per day everyday. Weight is down about 30 lbs now.    Monitoring:  Monitoring Assessed Today: Yes  Did patient bring glucose meter to appointment? : Yes  Blood Glucose Meter: Accu-chek  Times checking blood sugar at home (number): 2  Times checking blood sugar at home (per): Day  Blood glucose trend: No change    Checking blood sugar BID. 110-115 usually. Reports a couple in the 150's 2 days ago because of the 4th of July and having some different foods.    Taking Medications:  Diabetes Medication(s)     Biguanides       metFORMIN (GLUCOPHAGE) 1000 MG tablet    Take 0.5 tablets (500 mg) by mouth 2 times daily (with meals)     Sulfonylureas       glimepiride (AMARYL) 1 MG tablet    Take 1 tablet (1 mg) by mouth every morning (before breakfast)          Taking Medication Assessed Today: Yes  Current Treatments: Oral Medication (taken by mouth), Diet  Problems taking diabetes medications regularly?: No  Diabetes medication side effects?: No    Problem Solving:  Problem Solving Assessed Today: No    Reducing Risks:  Reducing Risks Assessed Today: No  Diabetes Risks: Age over 45 years  CAD Risks: Diabetes Mellitus    Healthy Coping:  Healthy Coping Assessed Today: Yes  Emotional response to diabetes: Ready to learn  Stage of change: ACTION (Actively working towards change)  Patient Activation Measure Survey Score:  No flowsheet data found.    Diabetes knowledge and skills assessment:   Patient is knowledgeable in diabetes management concepts related to: Being Active, Taking Medication, Problem Solving, Reducing Risks and Healthy Coping    Patient needs further education on the following diabetes management concepts: Healthy Eating and Monitoring    Based on learning assessment above, most appropriate setting for further diabetes education would be: Individual setting.      INTERVENTIONS:    Education provided today on:  AADE Self-Care Behaviors:  Healthy Eating: consistency in amount, composition, and timing of food intake and weight reduction  Being Active: relationship to blood glucose  Monitoring: log and interpret results and individual blood glucose targets    Opportunities for ongoing education and support in diabetes-self management were discussed.    Pt verbalized understanding of concepts discussed and recommendations provided today.       Education Materials Provided:  No new materials provided today      ASSESSMENT:  Jordan has made positive diet and lifestyle changes the past couple years and has lost about 30 lbs in that time.  He walks daily for a few hours and also is careful with his diet.     Would benefit from more  consistency with his carb intake with his diet so focused on this today.     His blood sugars are mostly all in target per report and his A1c has remained stable and is at goal.         Patient's most recent   Lab Results   Component Value Date    A1C 6.4 06/11/2021    is meeting goal of <7.0    PLAN  See Patient Instructions for co-developed, patient-stated behavior change goals.  Try to add a little fruit to breakfast and either fruit or 1-2 roti with dinner.   Follow up with CDE as needed.  Continue regular follow up with endo.  AVS printed and provided to patient today. See Follow-Up section for recommended follow-up.    JERRI Mccollum CDE    Time Spent: 15 minutes  Encounter Type: Individual    Any diabetes medication dose changes were made via the CDE Protocol and Collaborative Practice Agreement with the patient's referring provider. A copy of this encounter was shared with the provider.

## 2021-07-13 DIAGNOSIS — E11.9 TYPE 2 DIABETES MELLITUS WITHOUT COMPLICATION, WITHOUT LONG-TERM CURRENT USE OF INSULIN (H): Primary | ICD-10-CM

## 2021-07-13 NOTE — TELEPHONE ENCOUNTER
Patient needs a new prescription for alcohol swabs to use with blood glucose monitor. Please send a new prescription to [facility].    Thank you

## 2021-08-16 ENCOUNTER — OFFICE VISIT (OUTPATIENT)
Dept: DERMATOLOGY | Facility: CLINIC | Age: 66
End: 2021-08-16
Payer: MEDICAID

## 2021-08-16 VITALS — DIASTOLIC BLOOD PRESSURE: 68 MMHG | SYSTOLIC BLOOD PRESSURE: 124 MMHG

## 2021-08-16 DIAGNOSIS — L29.9 LOCALIZED PRURITUS: ICD-10-CM

## 2021-08-16 DIAGNOSIS — L29.9 SCALP ITCH: ICD-10-CM

## 2021-08-16 DIAGNOSIS — B07.8 COMMON WART: ICD-10-CM

## 2021-08-16 DIAGNOSIS — L21.9 DERMATITIS, SEBORRHEIC: Primary | ICD-10-CM

## 2021-08-16 DIAGNOSIS — B35.3 TINEA PEDIS OF LEFT FOOT: ICD-10-CM

## 2021-08-16 DIAGNOSIS — L84 CALLUS: ICD-10-CM

## 2021-08-16 PROCEDURE — 99243 OFF/OP CNSLTJ NEW/EST LOW 30: CPT | Performed by: PHYSICIAN ASSISTANT

## 2021-08-16 RX ORDER — BETAMETHASONE DIPROPIONATE 0.5 MG/ML
LOTION, AUGMENTED TOPICAL 2 TIMES DAILY
Qty: 30 ML | Refills: 1 | Status: SHIPPED | OUTPATIENT
Start: 2021-08-16 | End: 2021-11-22

## 2021-08-16 RX ORDER — KETOCONAZOLE 20 MG/G
CREAM TOPICAL 2 TIMES DAILY
Qty: 60 G | Refills: 3 | Status: SHIPPED | OUTPATIENT
Start: 2021-08-16 | End: 2021-10-27

## 2021-08-16 RX ORDER — KETOCONAZOLE 20 MG/ML
SHAMPOO TOPICAL
Qty: 120 ML | Refills: 11 | Status: SHIPPED | OUTPATIENT
Start: 2021-08-16 | End: 2021-10-27

## 2021-08-16 RX ORDER — FLUOCINONIDE TOPICAL SOLUTION USP, 0.05% 0.5 MG/ML
SOLUTION TOPICAL
Qty: 60 ML | Refills: 3 | Status: SHIPPED | OUTPATIENT
Start: 2021-08-16 | End: 2021-10-27

## 2021-08-16 NOTE — PROGRESS NOTES
HPI:  I was asked to see pt by Dr. Wise. Jordan Miller is a 66 year old male patient here today for itchy scalp, ears and beard .  Patient states this has been present for a while.  Patient reports the following symptoms: itch .  Patient reports the following previous treatments: selsen blue with improvement. Also has dry areas on left foot. Using moisturizer with some improvement.   Patient reports the following modifying factors: none.  Associated symptoms: none.  Patient has no other skin complaints today.  Remainder of the HPI, Meds, PMH, Allergies, FH, and SH was reviewed in chart.      Past Medical History:   Diagnosis Date     BPH (benign prostatic hyperplasia)      Hyperlipidemia      Hypertension      Left ankle pain      Type 2 diabetes mellitus (H)        Past Surgical History:   Procedure Laterality Date     CHOLECYSTECTOMY      1994     HERNIA REPAIR, UMBILICAL      ?2013        History reviewed. No pertinent family history.    Social History     Socioeconomic History     Marital status:      Spouse name: Not on file     Number of children: Not on file     Years of education: Not on file     Highest education level: Not on file   Occupational History     Not on file   Tobacco Use     Smoking status: Never Smoker     Smokeless tobacco: Never Used   Substance and Sexual Activity     Alcohol use: Yes     Comment: 2-3 drinks a week hard liquor     Drug use: Never     Sexual activity: Not on file   Other Topics Concern     Not on file   Social History Narrative     Not on file     Social Determinants of Health     Financial Resource Strain:      Difficulty of Paying Living Expenses:    Food Insecurity:      Worried About Running Out of Food in the Last Year:      Ran Out of Food in the Last Year:    Transportation Needs:      Lack of Transportation (Medical):      Lack of Transportation (Non-Medical):    Physical Activity:      Days of Exercise per Week:      Minutes of Exercise per Session:     Stress:      Feeling of Stress :    Social Connections:      Frequency of Communication with Friends and Family:      Frequency of Social Gatherings with Friends and Family:      Attends [employer] Services:      Active Member of Clubs or Organizations:      Attends Club or Organization Meetings:      Marital Status:    Intimate Partner Violence:      Fear of Current or Ex-Partner:      Emotionally Abused:      Physically Abused:      Sexually Abused:        Outpatient Encounter Medications as of 8/16/2021   Medication Sig Dispense Refill     acetaminophen (TYLENOL) 325 MG tablet 1-2 tablets every 8 hours as needed for pain or fever 90 tablet 0     Alcohol Swabs (ALCOHOL WIPES) 70 % PADS USE AS DIRECTED TO TEST BLOOD SUGARS 100 each 3     atenolol (TENORMIN) 25 MG tablet Take 1 tablet (25 mg) by mouth daily 90 tablet 1     augmented betamethasone dipropionate (DIPROLENE) 0.05 % external lotion Apply topically 2 times daily To affected area on ears and face for 2-3 weeks. Tapering with improvement and restarting with flares. 30 mL 1     blood glucose monitoring (ACCU-CHEK ANAMARIA PLUS) meter device kit Use to test blood sugar 1 times daily or as directed. 1 kit 0     cyanocobalamin (VITAMIN B-12) 1000 MCG tablet Take 1 tablet (1,000 mcg) by mouth every other day 90 tablet 0     fluocinonide (LIDEX) 0.05 % external solution Apply to affected area on scalp BID x 4-6 weeks, tapering with improvement. Do not apply to face or body folds. 60 mL 3     glimepiride (AMARYL) 1 MG tablet Take 1 tablet (1 mg) by mouth every morning (before breakfast) 90 tablet 0     ketoconazole (NIZORAL) 2 % external cream Apply topically 2 times daily To left foot for 4-6 weeks. 60 g 3     ketoconazole (NIZORAL) 2 % external shampoo Wet affected area daily, apply shampoo and lather, let sit for 3-5 minutes and then rinse. 120 mL 11     lisinopril (ZESTRIL) 10 MG tablet Take 1 tablet (10 mg) by mouth daily 90 tablet 1     metFORMIN (GLUCOPHAGE)  1000 MG tablet Take 0.5 tablets (500 mg) by mouth 2 times daily (with meals) 180 tablet 1     Omega-3 Fatty Acids (FISH OIL BURP-LESS) 1000 MG CAPS Take 1 capsule by mouth 2 times daily 180 capsule 0     Omega-3 Fish Oil 500 MG capsule Take 1-2 capsules (500-1,000 mg) by mouth daily 180 capsule 1     tamsulosin (FLOMAX) 0.4 MG capsule Take 1 capsule (0.4 mg) by mouth 2 times daily 180 capsule 0     urea (GORDONS UREA) 40 % external ointment Apply to yellow thick areas on feet 1-2x daily 30 g 3     vitamin D3 (CHOLECALCIFEROL) 50 mcg (2000 units) tablet Take 2 tablets (100 mcg) by mouth daily 180 tablet 0     No facility-administered encounter medications on file as of 8/16/2021.       Review Of Systems:  Skin: self  Eyes: negative  Ears/Nose/Throat: negative  Respiratory: No shortness of breath, dyspnea on exertion, cough, or hemoptysis  Cardiovascular: negative  Gastrointestinal: negative  Genitourinary: negative  Musculoskeletal: negative  Neurologic: negative  Psychiatric: negative  Hematologic/Lymphatic/Immunologic: negative  Endocrine: negative      Objective:     /68   Eyes: Conjunctivae/lids: Normal   ENT: Lips:  Normal  MSK: Normal  Cardiovascular: Peripheral edema none  Pulm: Breathing Normal  Neuro/Psych: Orientation: A/O x 3 Normal; Mood/Affect: Normal, NAD, WDWN  Pt accompanied by: self  Following areas examined: face, neck, scalp, feet, hands. Pt wearing mask.   Crane skin type:iv   Findings:  Flesh-colored verrucous appearing papule on right 3rd PIP  nml appearing scalp, ears, and beard  Generalized flaking of skin of left foot  Yellow thickened skin on left plantar foot, left medial great toe  Assessment and Plan:  1) tinea pedis, callus  Begin ketoconazole 2x a day to feet  Begin urea cream 1-2x a day to thickened skin on feet    2) common wart  Treatment options include Cimetidine, Aldara, Efudex, OTC topicals, wart peel, metaplast tape, candida injection, Bleomycin, cantharidin topical,  cryo therapy, excision, and electrocautery.   Pt defers tx.     3) Seborrheic Dermatitis, localized pruritis  Chronic condition that cannot be cured, but it can be managed.     Scalp:   Wash scalp daily with Nizoral: Wet affected area daily, apply shampoo and lather, let sit for 3-5 minutes and then rinse.   If multiple shampoos discussed begin  two prescription shampoos or one prescription shampoo and one over the counter shampoo  (examples: Head and shoulders, Selsen Blue, Ketoconazole, T-Sal, and/or T-gel.     Lidex: Apply a thin layer to affected area on scalp 2x a day x 4 weeks, tapering with improvement. Do not apply to face or body folds.     Face:   Wash affected area on beard and ears daily in the shower with nizoral shampoo  Apply betamethasone lotion to aa on ears and beard bid x 2-4 weeks.     Side effects of topical steroids including but not limited to atrophy (skin thinning), striae (stretch marks) telangiectasias, steroid acne, and others. Do not apply to normal skin. Do not apply to discolored skin that does not have rash present.   It was a pleasure speaking with Jordan Miller today.   Follow up in 6-8 weeks

## 2021-08-16 NOTE — PATIENT INSTRUCTIONS
Begin ketoconazole 2x a day to feet  Begin urea cream 1-2x a day to thickened skin on feet    Seborrheic Dermatitis  Chronic condition that cannot be cured, but it can be managed.     Scalp:   Wash scalp daily with Nizoral: Wet affected area daily, apply shampoo and lather, let sit for 3-5 minutes and then rinse.   If multiple shampoos discussed begin  two prescription shampoos or one prescription shampoo and one over the counter shampoo  (examples: Head and shoulders, Selsen Blue, Ketoconazole, T-Sal, and/or T-gel.     Lidex: Apply a thin layer to affected area on scalp 2x a day x 4 weeks, tapering with improvement. Do not apply to face or body folds.     Face:   Wash affected area on beard and ears daily in the shower with nizoral shampoo    Side effects of topical steroids including but not limited to atrophy (skin thinning), striae (stretch marks) telangiectasias, steroid acne, and others. Do not apply to normal skin. Do not apply to discolored skin that does not have rash present.     Follow up in 4-6 weeks.

## 2021-08-16 NOTE — LETTER
8/16/2021         RE: Jordan Miller  [address] [place] [address]        Dear Colleague,    Thank you for referring your patient, Jordan Miller, to the [facility]. Please see a copy of my visit note below.    HPI:  I was asked to see pt by Dr. Wise. Jordan Miller is a 66 year old male patient here today for itchy scalp, ears and beard .  Patient states this has been present for a while.  Patient reports the following symptoms: itch .  Patient reports the following previous treatments: selsen blue with improvement. Also has dry areas on left foot. Using moisturizer with some improvement.   Patient reports the following modifying factors: none.  Associated symptoms: none.  Patient has no other skin complaints today.  Remainder of the HPI, Meds, PMH, Allergies, FH, and SH was reviewed in chart.      Past Medical History:   Diagnosis Date     BPH (benign prostatic hyperplasia)      Hyperlipidemia      Hypertension      Left ankle pain      Type 2 diabetes mellitus (H)        Past Surgical History:   Procedure Laterality Date     CHOLECYSTECTOMY      1994     HERNIA REPAIR, UMBILICAL      ?2013        History reviewed. No pertinent family history.    Social History     Socioeconomic History     Marital status:      Spouse name: Not on file     Number of children: Not on file     Years of education: Not on file     Highest education level: Not on file   Occupational History     Not on file   Tobacco Use     Smoking status: Never Smoker     Smokeless tobacco: Never Used   Substance and Sexual Activity     Alcohol use: Yes     Comment: 2-3 drinks a week hard liquor     Drug use: Never     Sexual activity: Not on file   Other Topics Concern     Not on file   Social History Narrative     Not on file     Social Determinants of Health     Financial Resource Strain:      Difficulty of Paying Living Expenses:    Food Insecurity:      Worried About Running Out of Food in the Last  Year:      Ran Out of Food in the Last Year:    Transportation Needs:      Lack of Transportation (Medical):      Lack of Transportation (Non-Medical):    Physical Activity:      Days of Exercise per Week:      Minutes of Exercise per Session:    Stress:      Feeling of Stress :    Social Connections:      Frequency of Communication with Friends and Family:      Frequency of Social Gatherings with Friends and Family:      Attends [employer] orth[employer] Services:      Active Member of Clubs or Organizations:      Attends Club or Organization Meetings:      Marital Status:    Intimate Partner Violence:      Fear of Current or Ex-Partner:      Emotionally Abused:      Physically Abused:      Sexually Abused:        Outpatient Encounter Medications as of 8/16/2021   Medication Sig Dispense Refill     acetaminophen (TYLENOL) 325 MG tablet 1-2 tablets every 8 hours as needed for pain or fever 90 tablet 0     Alcohol Swabs (ALCOHOL WIPES) 70 % PADS USE AS DIRECTED TO TEST BLOOD SUGARS 100 each 3     atenolol (TENORMIN) 25 MG tablet Take 1 tablet (25 mg) by mouth daily 90 tablet 1     augmented betamethasone dipropionate (DIPROLENE) 0.05 % external lotion Apply topically 2 times daily To affected area on ears and face for 2-3 weeks. Tapering with improvement and restarting with flares. 30 mL 1     blood glucose monitoring (ACCU-CHEK ANAMARIA PLUS) meter device kit Use to test blood sugar 1 times daily or as directed. 1 kit 0     cyanocobalamin (VITAMIN B-12) 1000 MCG tablet Take 1 tablet (1,000 mcg) by mouth every other day 90 tablet 0     fluocinonide (LIDEX) 0.05 % external solution Apply to affected area on scalp BID x 4-6 weeks, tapering with improvement. Do not apply to face or body folds. 60 mL 3     glimepiride (AMARYL) 1 MG tablet Take 1 tablet (1 mg) by mouth every morning (before breakfast) 90 tablet 0     ketoconazole (NIZORAL) 2 % external cream Apply topically 2 times daily To left foot for 4-6 weeks. 60 g 3     ketoconazole  (NIZORAL) 2 % external shampoo Wet affected area daily, apply shampoo and lather, let sit for 3-5 minutes and then rinse. 120 mL 11     lisinopril (ZESTRIL) 10 MG tablet Take 1 tablet (10 mg) by mouth daily 90 tablet 1     metFORMIN (GLUCOPHAGE) 1000 MG tablet Take 0.5 tablets (500 mg) by mouth 2 times daily (with meals) 180 tablet 1     Omega-3 Fatty Acids (FISH OIL BURP-LESS) 1000 MG CAPS Take 1 capsule by mouth 2 times daily 180 capsule 0     Omega-3 Fish Oil 500 MG capsule Take 1-2 capsules (500-1,000 mg) by mouth daily 180 capsule 1     tamsulosin (FLOMAX) 0.4 MG capsule Take 1 capsule (0.4 mg) by mouth 2 times daily 180 capsule 0     urea (GORDONS UREA) 40 % external ointment Apply to yellow thick areas on feet 1-2x daily 30 g 3     vitamin D3 (CHOLECALCIFEROL) 50 mcg (2000 units) tablet Take 2 tablets (100 mcg) by mouth daily 180 tablet 0     No facility-administered encounter medications on file as of 8/16/2021.       Review Of Systems:  Skin: self  Eyes: negative  Ears/Nose/Throat: negative  Respiratory: No shortness of breath, dyspnea on exertion, cough, or hemoptysis  Cardiovascular: negative  Gastrointestinal: negative  Genitourinary: negative  Musculoskeletal: negative  Neurologic: negative  Psychiatric: negative  Hematologic/Lymphatic/Immunologic: negative  Endocrine: negative      Objective:     /68   Eyes: Conjunctivae/lids: Normal   ENT: Lips:  Normal  MSK: Normal  Cardiovascular: Peripheral edema none  Pulm: Breathing Normal  Neuro/Psych: Orientation: A/O x 3 Normal; Mood/Affect: Normal, NAD, WDWN  Pt accompanied by: self  Following areas examined: face, neck, scalp, feet, hands. Pt wearing mask.   Crane skin type:iv   Findings:  Flesh-colored verrucous appearing papule on right 3rd PIP  nml appearing scalp, ears, and beard  Generalized flaking of skin of left foot  Yellow thickened skin on left plantar foot, left medial great toe  Assessment and Plan:  1) tinea pedis, callus  Begin  ketoconazole 2x a day to feet  Begin urea cream 1-2x a day to thickened skin on feet    2) common wart  Treatment options include Cimetidine, Aldara, Efudex, OTC topicals, wart peel, metaplast tape, candida injection, Bleomycin, cantharidin topical, cryo therapy, excision, and electrocautery.   Pt defers tx.     3) Seborrheic Dermatitis, localized pruritis  Chronic condition that cannot be cured, but it can be managed.     Scalp:   Wash scalp daily with Nizoral: Wet affected area daily, apply shampoo and lather, let sit for 3-5 minutes and then rinse.   If multiple shampoos discussed begin  two prescription shampoos or one prescription shampoo and one over the counter shampoo  (examples: Head and shoulders, Selsen Blue, Ketoconazole, T-Sal, and/or T-gel.     Lidex: Apply a thin layer to affected area on scalp 2x a day x 4 weeks, tapering with improvement. Do not apply to face or body folds.     Face:   Wash affected area on beard and ears daily in the shower with nizoral shampoo  Apply betamethasone lotion to aa on ears and beard bid x 2-4 weeks.     Side effects of topical steroids including but not limited to atrophy (skin thinning), striae (stretch marks) telangiectasias, steroid acne, and others. Do not apply to normal skin. Do not apply to discolored skin that does not have rash present.   It was a pleasure speaking with Jordan Miller today.   Follow up in 6-8 weeks        Again, thank you for allowing me to participate in the care of your patient.        Sincerely,        Amna Julian PA-C

## 2021-08-18 ENCOUNTER — TELEPHONE (OUTPATIENT)
Dept: FAMILY MEDICINE | Facility: CLINIC | Age: 66
End: 2021-08-18

## 2021-08-18 NOTE — TELEPHONE ENCOUNTER
Begin ketoconazole 2x a day to feet  Begin urea cream 1-2x a day to thickened skin on feet     Seborrheic Dermatitis  Chronic condition that cannot be cured, but it can be managed.      Scalp:   Wash scalp daily with Nizoral: Wet affected area daily, apply shampoo and lather, let sit for 3-5 minutes and then rinse.   If multiple shampoos discussed begin  two prescription shampoos or one prescription shampoo and one over the counter shampoo  (examples: Head and shoulders, Selsen Blue, Ketoconazole, T-Sal, and/or T-gel.      Lidex: Apply a thin layer to affected area on scalp 2x a day x 4 weeks, tapering with improvement. Do not apply to face or body folds.      Face:   Wash affected area on beard and ears daily in the shower with nizoral shampoo     Side effects of topical steroids including but not limited to atrophy (skin thinning), striae (stretch marks) telangiectasias, steroid acne, and others. Do not apply to normal skin. Do not apply to discolored skin that does not have rash present.      Discussed the medication information that is stated above. Patient understood.    SHANE Staton

## 2021-08-18 NOTE — TELEPHONE ENCOUNTER
[facility] Call Center    Phone Message    May a detailed message be left on voicemail: yes     Reason for Call: Medication Question or concern regarding medication   Prescription Clarification  Name of Medication: 5 meds  Prescribing Provider: OLIVERIO Julian   Pharmacy: ?   What on the order needs clarification? The pt needs a nurse to call him today to explain how he is to use all of the meds he was prescribed. Please call today if poss as he wants to start them. Thanks.          Action Taken: Message routed to:  Other: [facility] derm    Travel Screening: Not Applicable

## 2021-08-20 DIAGNOSIS — E11.9 TYPE 2 DIABETES MELLITUS WITHOUT COMPLICATION, WITHOUT LONG-TERM CURRENT USE OF INSULIN (H): ICD-10-CM

## 2021-08-26 ENCOUNTER — TELEPHONE (OUTPATIENT)
Dept: FAMILY MEDICINE | Facility: CLINIC | Age: 66
End: 2021-08-26

## 2021-08-26 DIAGNOSIS — E11.9 TYPE 2 DIABETES MELLITUS WITHOUT COMPLICATION, WITHOUT LONG-TERM CURRENT USE OF INSULIN (H): Primary | ICD-10-CM

## 2021-08-26 DIAGNOSIS — R79.89 LOW VITAMIN B12 LEVEL: ICD-10-CM

## 2021-08-26 DIAGNOSIS — E78.5 DYSLIPIDEMIA: ICD-10-CM

## 2021-08-26 DIAGNOSIS — R79.89 LOW VITAMIN D LEVEL: ICD-10-CM

## 2021-08-26 DIAGNOSIS — I10 HYPERTENSION, UNSPECIFIED TYPE: ICD-10-CM

## 2021-08-26 NOTE — CONFIDENTIAL NOTE
Pt requesting fasting labs, A1C and every normal labs he gets every few months  be put in to get drawn before scheduling an appt with you.   Please advise if appropriate.   Ely Damian

## 2021-08-27 NOTE — TELEPHONE ENCOUNTER
"PCP: [facility] pended labs, patient scheduling lab only appt for next month. Also has OV on 9/2/21 with Dr. Plummer, unsure if labs can be done during this appt.    Called patient notified to schedule next month. Also reviewed that we do not do standing orders for those labs.   Patient has OV on 9/2 with Dr Plummer, requesting labs to be done then. Writer unsure if Dr. Plummer does labs during their appts. No lab availability prior to appt.  Patient requests labs be scheduled at [facility] \"in [place]\". Writer transferred patient to central scheduling to schedule lab only fasting appt..    Jourdan Peoples RN  [facility]    "

## 2021-08-27 NOTE — TELEPHONE ENCOUNTER
Advised patient we can place order for lipid, A1c CMP which I recommend he schedules in the next month fasting. Follow up if due for appointment.  I do not think we have the option to place a standing order or if is indicated as such.  Patient can also call us ahead of time and let us know he is coming for labs we can place orders as I find appropriate

## 2021-08-31 ENCOUNTER — TELEPHONE (OUTPATIENT)
Dept: FAMILY MEDICINE | Facility: CLINIC | Age: 66
End: 2021-08-31

## 2021-08-31 ENCOUNTER — LAB (OUTPATIENT)
Dept: LAB | Facility: CLINIC | Age: 66
End: 2021-08-31
Payer: MEDICARE

## 2021-08-31 DIAGNOSIS — I10 HYPERTENSION, UNSPECIFIED TYPE: ICD-10-CM

## 2021-08-31 DIAGNOSIS — E78.5 DYSLIPIDEMIA: ICD-10-CM

## 2021-08-31 DIAGNOSIS — E11.9 TYPE 2 DIABETES MELLITUS WITHOUT COMPLICATION, WITHOUT LONG-TERM CURRENT USE OF INSULIN (H): ICD-10-CM

## 2021-08-31 DIAGNOSIS — R79.89 LOW VITAMIN D LEVEL: ICD-10-CM

## 2021-08-31 DIAGNOSIS — R79.89 LOW VITAMIN B12 LEVEL: ICD-10-CM

## 2021-08-31 DIAGNOSIS — Z12.5 SCREENING FOR PROSTATE CANCER: Primary | ICD-10-CM

## 2021-08-31 LAB
ALBUMIN SERPL-MCNC: 3.9 G/DL (ref 3.5–5)
ALP SERPL-CCNC: 59 U/L (ref 45–120)
ALT SERPL W P-5'-P-CCNC: 16 U/L (ref 0–45)
ANION GAP SERPL CALCULATED.3IONS-SCNC: 11 MMOL/L (ref 5–18)
AST SERPL W P-5'-P-CCNC: 19 U/L (ref 0–40)
BILIRUB SERPL-MCNC: 0.5 MG/DL (ref 0–1)
BUN SERPL-MCNC: 17 MG/DL (ref 8–22)
CALCIUM SERPL-MCNC: 9.6 MG/DL (ref 8.5–10.5)
CHLORIDE BLD-SCNC: 104 MMOL/L (ref 98–107)
CHOLEST SERPL-MCNC: 188 MG/DL
CO2 SERPL-SCNC: 23 MMOL/L (ref 22–31)
CREAT SERPL-MCNC: 0.96 MG/DL (ref 0.7–1.3)
ERYTHROCYTE [DISTWIDTH] IN BLOOD BY AUTOMATED COUNT: 13 % (ref 10–15)
FASTING STATUS PATIENT QL REPORTED: YES
GFR SERPL CREATININE-BSD FRML MDRD: 83 ML/MIN/1.73M2
GLUCOSE BLD-MCNC: 129 MG/DL (ref 70–125)
HBA1C MFR BLD: 6.2 % (ref 0–5.6)
HCT VFR BLD AUTO: 40.8 % (ref 40–53)
HDLC SERPL-MCNC: 65 MG/DL
HGB BLD-MCNC: 13.4 G/DL (ref 13.3–17.7)
LDLC SERPL CALC-MCNC: 113 MG/DL
MCH RBC QN AUTO: 28.4 PG (ref 26.5–33)
MCHC RBC AUTO-ENTMCNC: 32.8 G/DL (ref 31.5–36.5)
MCV RBC AUTO: 86 FL (ref 78–100)
PLATELET # BLD AUTO: 241 10E3/UL (ref 150–450)
POTASSIUM BLD-SCNC: 4.5 MMOL/L (ref 3.5–5)
PROT SERPL-MCNC: 6.8 G/DL (ref 6–8)
PSA SERPL-MCNC: 1.1 UG/L (ref 0–4.5)
RBC # BLD AUTO: 4.72 10E6/UL (ref 4.4–5.9)
SODIUM SERPL-SCNC: 138 MMOL/L (ref 136–145)
TRIGL SERPL-MCNC: 52 MG/DL
VIT B12 SERPL-MCNC: 571 PG/ML (ref 213–816)
WBC # BLD AUTO: 6.7 10E3/UL (ref 4–11)

## 2021-08-31 PROCEDURE — 80053 COMPREHEN METABOLIC PANEL: CPT

## 2021-08-31 PROCEDURE — 85027 COMPLETE CBC AUTOMATED: CPT

## 2021-08-31 PROCEDURE — 82306 VITAMIN D 25 HYDROXY: CPT

## 2021-08-31 PROCEDURE — 80061 LIPID PANEL: CPT

## 2021-08-31 PROCEDURE — 82607 VITAMIN B-12: CPT

## 2021-08-31 PROCEDURE — G0103 PSA SCREENING: HCPCS | Performed by: INTERNAL MEDICINE

## 2021-08-31 PROCEDURE — 36415 COLL VENOUS BLD VENIPUNCTURE: CPT

## 2021-08-31 PROCEDURE — 83036 HEMOGLOBIN GLYCOSYLATED A1C: CPT

## 2021-08-31 NOTE — TELEPHONE ENCOUNTER
Order placed for PSA screen.  Last PSA screen done in April 2021.  Patient would not need a repeat PSA screen now.  This is not what we discussed with patient that he needs a repeat PSA screen.  PSA screen may be done yearly but not every 5 months.  An order for PSA screen has been placed per patient request. Patient will need to check with insurance to cover such.

## 2021-08-31 NOTE — TELEPHONE ENCOUNTER
[facility] lab calling states pt is there to get blood drawn.  Pt told  that he discussed getting PSA test done with provider but no orders are in the chart.     states she will draw the appropriate colored tube and have pt leave a urine sample as well and will wait for orders from provider.    Call [facility] lab at [phone] when orders are in chart by provider.    Andreea SALAZAR RN  EP Triage

## 2021-09-01 LAB — DEPRECATED CALCIDIOL+CALCIFEROL SERPL-MC: 59 UG/L (ref 30–80)

## 2021-09-02 ENCOUNTER — OFFICE VISIT (OUTPATIENT)
Dept: FAMILY MEDICINE | Facility: CLINIC | Age: 66
End: 2021-09-02
Payer: MEDICARE

## 2021-09-02 ENCOUNTER — TELEPHONE (OUTPATIENT)
Dept: FAMILY MEDICINE | Facility: CLINIC | Age: 66
End: 2021-09-02

## 2021-09-02 VITALS
HEIGHT: 67 IN | SYSTOLIC BLOOD PRESSURE: 136 MMHG | RESPIRATION RATE: 16 BRPM | BODY MASS INDEX: 27.15 KG/M2 | OXYGEN SATURATION: 100 % | DIASTOLIC BLOOD PRESSURE: 59 MMHG | TEMPERATURE: 96.4 F | HEART RATE: 63 BPM | WEIGHT: 173 LBS

## 2021-09-02 DIAGNOSIS — R41.89 COGNITIVE IMPAIRMENT: Primary | ICD-10-CM

## 2021-09-02 PROCEDURE — 99214 OFFICE O/P EST MOD 30 MIN: CPT | Performed by: INTERNAL MEDICINE

## 2021-09-02 ASSESSMENT — MIFFLIN-ST. JEOR: SCORE: 1528.35

## 2021-09-02 NOTE — TELEPHONE ENCOUNTER
Pt called     States he missed calls from us     Could not find a reason we would have called     Did review PCP's recent lab results with him     Emily WILCOX RN

## 2021-09-02 NOTE — Clinical Note
"LUCASI, I wasn't very successful with Mr. Miller unfortunately. My advise would be to see if next time you see him you can have him sign a \"consent to communicate\" form for us to be able to discuss his cognition and daily functioning with his family. He was very guarded about talking about his occupational therapy results."

## 2021-09-02 NOTE — PROGRESS NOTES
"MEMORY EVALUATION CLINIC - FOLLOWUP    INTERVAL HISTORY: Mr. Jordan Miller is a 66yoM here today by himself in follow up of recent brain MRI (unremarkable for age) and occupational therapy cognitive assessment which were ordered after concerns of self reported short term memory loss. We had our first visit virtually in June 2021 to which I ordered the MRI and occupational therapy testing. At that visit he said that his daughter too had been concerned with his memory. Today he reports again short term memory loss about 6 months duration but does not provide examples.     Unfortunately this provider was running 30 minutes behind schedule today and because he had also arrived 30 minutes prior to his appointment time he perceived this being a full hour wait and was understandably upset. This provider profusely apologized to which he did allow the visit to continue. He was aware he'd had the brain MRI and occupational therapy testing for us to review. I gave him a copy of his brain MRI. I did begin to talk about occupational therapy testing noting some areas that were good but also some areas of concern to which he changed the subject quickly. He asked questions about the pulse pressure of his BP and also had some previously printed out labs and wanted to talk about them (ex: PSA which was within the normal range but he was having difficulty interpreting what was meant by the normal range). He brought up previous concerns re: lack of sweating despite his high level of exercise. He also asked about medications I had \"stopped\" at our previous visit but when I looked back at my notes and reviewed it with him what had actually occurred is he'd told me he'd stopped taking his ASA and statin against PCP wishes and so these had been removed from his active medication list. He was able to tell me today he believes he is still not taking those medications. When I tried to gently redirect back to his occupational therapy testing he " "was not interested at all and decided it was time for him to leave and he got up and walked out of the room. Of note, our clinic staff too have noted that he will come back after PCP visits in our clinic and feel like he will still have unanswered questions as he did with me today perseverating on different medical questions.    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 Not currently working during pandemic and reports kids have good jobs to help support him. Exercises daily with walks up to 6 miles and yoga/stretching.  Family Hx: None; father still living at 105 years old! Mother lived til she was 95 years old.  ADLs: Independent  IADLs: He denies difficulty but we don't have corborative history today; family does the majority of the cooking at baseline  Home situation: House with daughter and her family and his wife  Support: Family  Behaviors/Hallucinations/Delusions: None self reported    OBJECTIVE:  /59 (BP Location: Right arm, Patient Position: Chair, Cuff Size: Adult Regular)   Pulse 63   Temp (!) 96.4  F (35.8  C) (Temporal)   Resp 16   Ht 1.702 m (5' 7\")   Wt 78.5 kg (173 lb)   SpO2 100%   BMI 27.10 kg/m    Alert, casually dressed, normal gait  Normal speech, no tremor  Quickly changes subject at times and perseverates on topics unrelated to memory concerns  Slightly short tempered though acknowledged his legitimate frustrations    INVESTIGATIONS: Normal TSH; recently slightly low B12 at 247 on Metformin therapy and Rx B12 supplement in June with improvement on Aug 2021 labs. MRI brain June 2021 \"Mild generalized atrophy without convincing evidence of a dementia-specific volume loss pattern.\"      PRIOR TESTING: July 2021 occupational therapy testing: MoCA 18/30 and CPT 4.6/5.6.   Per occupational " "therapy, \"Summary of functional cognitive status: Pt demonstrating episodic and semantic memory impairments limiting executive control. There may have been some language bias but directions were provided slowly, at times in more simplified language to accommodate this, but despite this pt did demonstrate challenges with processing multi-step directives, attending to visual cues (ie asking for toaster when it was in front of him on Toast subtest; needing gloves in pile pointed out despite verbal cue to direct pt to look at other gloves of shop Subtest). He needed visual cue to actually go to the sink to wash his hands after repetition of instructions x 2 to do so on Wash subtest.\"      COGNITIVE MEDICATIONS: None      ASSESSMENT/PLAN: Mr. Jordan Miller is a 66yoM with concerns of slowly progressive memory loss noted by him and his daughter. Recent MRI unremarkable and was reviewed with him today. Unfortunately Mr. Miller was reluctant to listen to occupational therapy summary and wished to focus on other medical questions he had. He did have abnormal results on his recent occupational therapy testing and this in combination with his presenting concerns makes me concerned for a developing neurodegenerative disorder. However, corroborative history from family would be necessary to provide insight into how daily life truly is for Mr. Miller. We do not have a consent to communicate on file for his family unfortunately. I did reach out to his occupational therapist as per her note: \"Pt did sign GOLDY for therapist to speak with his son or dtr should they call with questions about the CPT or MOCA assessments completed today\" but this is not currently scanned into the medical record for me to review. I will also share recommendations with his PCP to try to have Consent to communicate signed at next visit and/or obtain corroborative history if able. Would be very important to know how he is doing in his IADLs (driving, medication " management, finances and meals) as based on occupational therapy assessment a driving eval may be indicated. He may be resistant to this given his history of working as a . At the very least would be good to see if family has ridden along with him recently to see how that goes. He does live with his daughter and wife so suspect he has a good deal of oversight and support. He unfortunately did not wish to stay for an AVS or any future follow up plans.      35 minutes spent on the date of the encounter doing chart review, history and exam, counseling, documentation and further activities as noted above      Marline Plummer DO  [facility]

## 2021-09-03 ENCOUNTER — TELEPHONE (OUTPATIENT)
Dept: FAMILY MEDICINE | Facility: CLINIC | Age: 66
End: 2021-09-03

## 2021-09-03 DIAGNOSIS — N13.8 BENIGN PROSTATIC HYPERPLASIA WITH URINARY OBSTRUCTION: Primary | ICD-10-CM

## 2021-09-03 DIAGNOSIS — N40.1 BENIGN PROSTATIC HYPERPLASIA WITH URINARY OBSTRUCTION: Primary | ICD-10-CM

## 2021-09-03 NOTE — TELEPHONE ENCOUNTER
Reason for Call: Request for an order or referral:    Order or referral being requested: Urology Referral    Date needed: Soon as possible    Has the patient been seen by the PCP for this problem? Unsure    Additional comments: Patient would like a referral to Urology in [place]. Does patient need an office visit prior to getting a referral? Please call                                                  Phone number Patient can be reached at:  Home number on file [phone] (home)    Best Time:  any    Can we leave a detailed message on this number?  YES    Call taken on 9/3/2021 at 5:04 PM by Cruz Lemon

## 2021-09-07 NOTE — TELEPHONE ENCOUNTER
Returned called to patient     States he has not seen urology for 3 years     Sept 28th has urology visit scheduled - [facility]     Supposed to be seeing them annually but has not due to COVID19, requesting a new referral - pended (for prostate enlargement)     Emily WILCOX RN

## 2021-09-23 DIAGNOSIS — R79.89 LOW VITAMIN B12 LEVEL: ICD-10-CM

## 2021-09-24 RX ORDER — LANOLIN ALCOHOL/MO/W.PET/CERES
1000 CREAM (GRAM) TOPICAL EVERY OTHER DAY
Qty: 90 TABLET | Refills: 2 | Status: SHIPPED | OUTPATIENT
Start: 2021-09-24 | End: 2023-05-15

## 2021-09-24 NOTE — TELEPHONE ENCOUNTER
Prescription approved per [facility] Refill Protocol.  LOV: 6/11/2021    Jourdan Peoples RN  [facility]

## 2021-09-28 ENCOUNTER — TRANSFERRED RECORDS (OUTPATIENT)
Dept: HEALTH INFORMATION MANAGEMENT | Facility: CLINIC | Age: 66
End: 2021-09-28

## 2021-10-03 ENCOUNTER — HEALTH MAINTENANCE LETTER (OUTPATIENT)
Age: 66
End: 2021-10-03

## 2021-10-04 ENCOUNTER — VIRTUAL VISIT (OUTPATIENT)
Dept: FAMILY MEDICINE | Facility: CLINIC | Age: 66
End: 2021-10-04
Payer: MEDICAID

## 2021-10-04 DIAGNOSIS — G89.29 CHRONIC PAIN OF BOTH KNEES: Primary | ICD-10-CM

## 2021-10-04 DIAGNOSIS — E78.5 DYSLIPIDEMIA: ICD-10-CM

## 2021-10-04 DIAGNOSIS — M25.562 CHRONIC PAIN OF BOTH KNEES: Primary | ICD-10-CM

## 2021-10-04 DIAGNOSIS — M54.2 NECK PAIN: ICD-10-CM

## 2021-10-04 DIAGNOSIS — R05.3 CHRONIC COUGH: ICD-10-CM

## 2021-10-04 DIAGNOSIS — M25.561 CHRONIC PAIN OF BOTH KNEES: Primary | ICD-10-CM

## 2021-10-04 PROCEDURE — 99442 PR PHYSICIAN TELEPHONE EVALUATION 11-20 MIN: CPT | Performed by: INTERNAL MEDICINE

## 2021-10-04 RX ORDER — ROSUVASTATIN CALCIUM 10 MG/1
10 TABLET, COATED ORAL DAILY
Qty: 90 TABLET | Refills: 3 | Status: SHIPPED | OUTPATIENT
Start: 2021-10-04 | End: 2024-05-23

## 2021-10-04 RX ORDER — GUAIFENESIN 600 MG/1
1200 TABLET, EXTENDED RELEASE ORAL 2 TIMES DAILY
Qty: 28 TABLET | Refills: 0 | Status: SHIPPED | OUTPATIENT
Start: 2021-10-04 | End: 2022-05-12

## 2021-10-04 NOTE — PROGRESS NOTES
Jordan is a 66 year old who is being evaluated via a billable telephone visit.      What phone number would you like to be contacted at? [phone]  How would you like to obtain your AVS? [facility]    Assessment & Plan   Problem List Items Addressed This Visit        Endocrine    Dyslipidemia    Relevant Medications    rosuvastatin (CRESTOR) 10 MG tablet      Other Visit Diagnoses     Chronic pain of both knees    -  Primary    Relevant Orders    Physical Therapy Referral    Neck pain        Relevant Orders    Physical Therapy Referral    Chronic cough        Relevant Medications    guaiFENesin (MUCINEX) 600 MG 12 hr tablet         Patient presents for telephone visit requesting referral to physical therapy at [facility] where he relocated.  He describes chronic knee pain he has arthritis in his knees and he had undergone physical therapy in the past for his knees and would like a new referral as he went to [facility] and came back so they need a new referral for such.  Patient denies any new injuries or knee swelling.  Also had left Achilles tendinitis in the past.  He is complaining of neck pain as well, he is very nonspecific historian.  Relates that to the way he sleeps.  He gets pain on the left side if sleeps on left including arm pain.  Not sure if there is radiculopathy from his history; again this is a telephone visit conducted with no formal evaluation.  Patient would like physical therapy to work on his neck, does not seem that there is any weakness or neurologic deficit/ no h/o neck injury.    Also requested to get cough medicine he uses it as needed in the winter season, advised we will need to reevaluate him when he gets cough symptoms and the prescribed prophylactic dose of medicine.  Patient said he will check with the pharmacy to see what cough medicine he used. Rechecked his records, he had on file mucinex, refill given x1 time  Advised patient to take his Crestor rosuvastatin daily last refill was  "in April 2021.  We will send in a new refill.           See Patient Instructions    Return in about 3 months (around 1/4/2022), or if symptoms worsen or fail to improve, for As needed and if symptoms worsen, Physical Exam.    Kristi Wise MD  [facility] Ortega is a 66 year old who presents for the following health issues   HPI     Previously goes to [address] [place] [address] to therapy in our building, then went to [facility], and got stock in mar, know has knee and neck pain,     Knee and neck pain, was seen by PT in past, was suggested a heel lift     Pain in knee ,   Little bit in neck , left side, neck pain, right arm more than left arm, shooting pain , when he sleep on side, when he gets up in morning, he feels stiffness in neck    \"Little they will do all body, when he goes to PT\",     No neck injury, they requested a new referral,     Moved to [facility], he wants to change clinic, [facility] .     No weakness in arms, no tingling of arms ..    If  sleep on one side, feels pain in arm and neck too.     Not taking cholesterol medicine, sometimes takes, advised to take,     Has one more question, one time refill,         Review of Systems   Constitutional, HEENT, cardiovascular, pulmonary, gi and gu systems are negative, except as otherwise noted.      Objective    Vitals - Patient Reported  Systolic (Patient Reported): (!) 150  Diastolic (Patient Reported): 80      Vitals:  No vitals were obtained today due to virtual visit.    Physical Exam   healthy, alert and no distress  PSYCH: Alert and oriented times 3; coherent speech, normal   rate and volume, able to articulate logical thoughts, able   to abstract reason, no tangential thoughts, no hallucinations   or delusions  His affect is normal  RESP: No cough, no audible wheezing, able to talk in full sentences  Remainder of exam unable to be completed due to telephone visits    Orders Only on 08/31/2021   Component Date Value Ref Range " Status     Prostate Specific Antigen Screen 08/31/2021 1.10  0.00 - 4.50 ug/L Final               Phone call duration: 13 minutes

## 2021-10-08 ENCOUNTER — HOSPITAL ENCOUNTER (OUTPATIENT)
Dept: PHYSICAL THERAPY | Facility: REHABILITATION | Age: 66
End: 2021-10-08
Attending: INTERNAL MEDICINE
Payer: MEDICARE

## 2021-10-08 DIAGNOSIS — G89.29 CHRONIC PAIN OF BOTH KNEES: ICD-10-CM

## 2021-10-08 DIAGNOSIS — M25.561 CHRONIC PAIN OF BOTH KNEES: ICD-10-CM

## 2021-10-08 DIAGNOSIS — M25.562 CHRONIC PAIN OF BOTH KNEES: ICD-10-CM

## 2021-10-08 DIAGNOSIS — M54.2 NECK PAIN: ICD-10-CM

## 2021-10-08 PROCEDURE — 97110 THERAPEUTIC EXERCISES: CPT | Mod: GP | Performed by: PHYSICAL THERAPIST

## 2021-10-08 PROCEDURE — 97161 PT EVAL LOW COMPLEX 20 MIN: CPT | Mod: GP | Performed by: PHYSICAL THERAPIST

## 2021-10-08 PROCEDURE — 97140 MANUAL THERAPY 1/> REGIONS: CPT | Mod: GP | Performed by: PHYSICAL THERAPIST

## 2021-10-12 NOTE — PROGRESS NOTES
[facility]          OUTPATIENT PHYSICAL THERAPY ORTHOPEDIC EVALUATION  PLAN OF TREATMENT FOR OUTPATIENT REHABILITATION  (COMPLETE FOR INITIAL CLAIMS ONLY)  Patient's Last Name, First Name, M.I.  YOB: 1955  Jordan Miller       Provider s Name:  [facility]   Medical Record No.  [id number]   Start of Care Date:  10/08/21   Onset Date:  08/08/21   Type:     _X__PT   ___OT   ___SLP Medical Diagnosis:  chronic bilateral knee pain, neck pain     PT Diagnosis:  decreased activity tolerance   Visits from SOC:  1      _________________________________________________________________________________  Plan of Treatment/Functional Goals:  balance training, gait training, joint mobilization, manual therapy, neuromuscular re-education, ROM, strengthening, stretching           Goals  Goal Identifier: HEP  Goal Description: Patient will be independent with HEP and self mangement of symptoms  Target Date: 01/07/22    Goal Identifier: Ambulation  Goal Description: Patient will walk 150 ft without pain to demonstrate improved pain free household mobility  Target Date: 01/07/22    Goal Identifier: Fall risk  Goal Description: Patient will perform 12 sit to stands in 30 seconds to demonstrate incresed LE and decreased risk for falls  Target Date: 01/07/22                                                           Therapy Frequency:  1 time/week  Predicted Duration of Therapy Intervention:  90    Romero Mcmahon PT                 I CERTIFY THE NEED FOR THESE SERVICES FURNISHED UNDER        THIS PLAN OF TREATMENT AND WHILE UNDER MY CARE     (Physician co-signature of this document indicates review and certification of the therapy plan).                       Certification Date From:  10/08/21   Certification Date To:  01/07/22    Referring Provider:  Kristi Wise MD    Initial Assessment        See Epic Evaluation Start of Care Date: 10/08/21                 10/08/21 1200   General Information   Type of Visit Initial OP Ortho PT Evaluation   Start of Care Date 10/08/21   Referring Physician Kristi Wise MD   Patient/Family Goals Statement heal knee pain, end neck pain   Orders Evaluate and Treat   Date of Order 10/04/21   Certification Required? Yes   Medical Diagnosis chronic bilateral knee pain, neck pain   Body Part(s)   Body Part(s) Knee;Cervical Spine   Presentation and Etiology   Pertinent history of current problem (include personal factors and/or comorbidities that impact the POC) Patient indicates that pain started 2 months ago.  He is having difficulty with walking and balance.  He also has consistent neck pain right> left.   Impairments A. Pain;D. Decreased ROM;E. Decreased flexibility;F. Decreased strength and endurance;G. Impaired balance;H. Impaired gait   Functional Limitations perform activities of daily living;perform desired leisure / sports activities   Symptom Location neck, bilateral knees, right heel>left   Onset date of current episode/exacerbation 08/08/21   Pain rating (0-10 point scale) Best (/10);Worst (/10)   Best (/10) 5   Worst (/10) 6   Frequency of pain/symptoms A. Constant   Pain/symptoms exacerbated by A. Sitting;B. Walking   Fall Risk Screen   Fall screen completed by PT   Have you fallen 2 or more times in the past year? No   Have you fallen and had an injury in the past year? Yes   Is patient a fall risk? Yes   Fall screen comments [employer] 8/ 30 seconds   Abuse Screen (yes response referral indicated)   Feels Unsafe at Home or Work/School no   Feels Threatened by Someone no   Does Anyone Try to Keep You From Having Contact with Others or Doing Things Outside Your Home? no   Physical Signs of Abuse Present no   Knee Objective Findings   Knee ROM Comment bilateral AROM WNL   Knee/Hip Strength Comments 4/5   Lachmans Test -   Varus Stress Test i   Valgus Stress Test i   Cervical Spine   Cervical Flexion ROM 50   Cervical  Extension ROM 50   Cervical Right Side Bending ROM 20   Cervical Left Side Bending ROM 30   Cervical Right Rotation ROM 45   Cervical Left Rotation ROM 50   Shoulder Shrug (C2-C4) Strength 5   Shoulder Abd (C5) Strength 5   Shoulder Add (C7) Strength 5   Elbow Flexion (C5, C6) Strength 5   Elbow Extension (C7) Strength 5   Wrist Extension (C6) Strength 5   Wrist Flexion (C7) Strength 5   Thumb Abd (C8) Strength 5   5th Finger Add (T1) Strength 5   Upper Trapezius Flexibility significant limitation   Pectoralis Minor Flexibility significant limitation   Planned Therapy Interventions   Planned Therapy Interventions balance training;gait training;joint mobilization;manual therapy;neuromuscular re-education;ROM;strengthening;stretching   Clinical Impression   Criteria for Skilled Therapeutic Interventions Met yes, treatment indicated   PT Diagnosis decreased activity tolerance   Influenced by the following impairments neck pain, bilateral knee pain, right heel pain, decreased flexibility, balance deficit   Clinical Presentation Stable/Uncomplicated   Clinical Decision Making (Complexity) Low complexity   Therapy Frequency 1 time/week   Predicted Duration of Therapy Intervention (days/wks) 90   Risk & Benefits of therapy have been explained Yes   Patient, Family & other staff in agreement with plan of care Yes   ORTHO GOALS   PT Ortho Eval Goals 1;2;3   Ortho Goal 1   Goal Identifier HEP   Goal Description Patient will be independent with HEP and self mangement of symptoms   Target Date 01/07/22   Ortho Goal 2   Goal Identifier Ambulation   Goal Description Patient will walk 150 ft without pain to demonstrate improved pain free household mobility   Target Date 01/07/22   Ortho Goal 3   Goal Identifier Fall risk   Goal Description Patient will perform 12 sit to stands in 30 seconds to demonstrate incresed LE and decreased risk for falls   Target Date 01/07/22   Total Evaluation Time   PT Eval, Low Complexity Minutes  (49285) 25   Therapy Certification   Certification date from 10/08/21   Certification date to 01/07/22   Medical Diagnosis chronic bilateral knee pain, neck pain

## 2021-10-19 ENCOUNTER — HOSPITAL ENCOUNTER (OUTPATIENT)
Dept: PHYSICAL THERAPY | Facility: REHABILITATION | Age: 66
End: 2021-10-19
Payer: MEDICARE

## 2021-10-19 DIAGNOSIS — M25.561 CHRONIC PAIN OF BOTH KNEES: Primary | ICD-10-CM

## 2021-10-19 DIAGNOSIS — M54.2 NECK PAIN: ICD-10-CM

## 2021-10-19 DIAGNOSIS — M25.562 CHRONIC PAIN OF BOTH KNEES: Primary | ICD-10-CM

## 2021-10-19 DIAGNOSIS — G89.29 CHRONIC PAIN OF BOTH KNEES: Primary | ICD-10-CM

## 2021-10-19 PROCEDURE — 97140 MANUAL THERAPY 1/> REGIONS: CPT | Mod: GP | Performed by: PHYSICAL THERAPIST

## 2021-10-27 ENCOUNTER — OFFICE VISIT (OUTPATIENT)
Dept: DERMATOLOGY | Facility: CLINIC | Age: 66
End: 2021-10-27
Payer: MEDICARE

## 2021-10-27 VITALS — HEART RATE: 82 BPM | OXYGEN SATURATION: 99 % | DIASTOLIC BLOOD PRESSURE: 54 MMHG | SYSTOLIC BLOOD PRESSURE: 119 MMHG

## 2021-10-27 DIAGNOSIS — B35.3 TINEA PEDIS OF LEFT FOOT: ICD-10-CM

## 2021-10-27 DIAGNOSIS — L21.9 DERMATITIS, SEBORRHEIC: ICD-10-CM

## 2021-10-27 DIAGNOSIS — B07.8 COMMON WART: ICD-10-CM

## 2021-10-27 DIAGNOSIS — L29.9 LOCALIZED PRURITUS: Primary | ICD-10-CM

## 2021-10-27 DIAGNOSIS — B35.6 TINEA CRURIS: ICD-10-CM

## 2021-10-27 DIAGNOSIS — L84 CALLUS: ICD-10-CM

## 2021-10-27 PROCEDURE — 17110 DESTRUCTION B9 LES UP TO 14: CPT | Performed by: PHYSICIAN ASSISTANT

## 2021-10-27 PROCEDURE — 99213 OFFICE O/P EST LOW 20 MIN: CPT | Mod: 25 | Performed by: PHYSICIAN ASSISTANT

## 2021-10-27 RX ORDER — KETOCONAZOLE 20 MG/G
CREAM TOPICAL 2 TIMES DAILY
Qty: 60 G | Refills: 3 | Status: SHIPPED | OUTPATIENT
Start: 2021-10-27 | End: 2023-05-12

## 2021-10-27 RX ORDER — KETOCONAZOLE 20 MG/ML
SHAMPOO TOPICAL
Qty: 120 ML | Refills: 11 | Status: SHIPPED | OUTPATIENT
Start: 2021-10-27 | End: 2022-01-10

## 2021-10-27 RX ORDER — FLUOCINONIDE TOPICAL SOLUTION USP, 0.05% 0.5 MG/ML
SOLUTION TOPICAL
Qty: 60 ML | Refills: 3 | Status: SHIPPED | OUTPATIENT
Start: 2021-10-27 | End: 2022-01-10

## 2021-10-27 NOTE — LETTER
10/27/2021         RE: Jordan Miller  [address] [place] [address]        Dear Colleague,    Thank you for referring your patient, Jordan Miller, to the [facility]. Please see a copy of my visit note below.    HPI:  Jordan Miller is a 66 year old male patient here today for follow up seb derm of ears, scalp and beard with great improvement with Nizoral and lidex a few times a week.  Also here for follow up tinea of left foot and callus of feet. Using ketoconazole cream and urea with great improvement. Also has a rash on groin treating with medication from [facility]. Not sure what the name is.  Patient states this has been present for a while.  Patient reports the following symptoms: itch, rash .  Patient reports the following previous treatments: topical.  Patient reports the following modifying factors: none.  Associated symptoms: none.  Patient has no other skin complaints today.  Remainder of the HPI, Meds, PMH, Allergies, FH, and SH was reviewed in chart.      Past Medical History:   Diagnosis Date     BPH (benign prostatic hyperplasia)      Hyperlipidemia      Hypertension      Left ankle pain      Type 2 diabetes mellitus (H)        Past Surgical History:   Procedure Laterality Date     CHOLECYSTECTOMY      1994     HERNIA REPAIR, UMBILICAL      ?2013        History reviewed. No pertinent family history.    Social History     Socioeconomic History     Marital status:      Spouse name: Not on file     Number of children: Not on file     Years of education: Not on file     Highest education level: Not on file   Occupational History     Not on file   Tobacco Use     Smoking status: Never Smoker     Smokeless tobacco: Never Used   Substance and Sexual Activity     Alcohol use: Yes     Comment: 2-3 drinks a week hard liquor     Drug use: Never     Sexual activity: Not on file   Other Topics Concern     Not on file   Social History Narrative     Not on file     Social  Determinants of Health     Financial Resource Strain:      Difficulty of Paying Living Expenses:    Food Insecurity:      Worried About Running Out of Food in the Last Year:      Ran Out of Food in the Last Year:    Transportation Needs:      Lack of Transportation (Medical):      Lack of Transportation (Non-Medical):    Physical Activity:      Days of Exercise per Week:      Minutes of Exercise per Session:    Stress:      Feeling of Stress :    Social Connections:      Frequency of Communication with Friends and Family:      Frequency of Social Gatherings with Friends and Family:      Attends Buddhism Services:      Active Member of Clubs or Organizations:      Attends Club or Organization Meetings:      Marital Status:    Intimate Partner Violence:      Fear of Current or Ex-Partner:      Emotionally Abused:      Physically Abused:      Sexually Abused:        Outpatient Encounter Medications as of 10/27/2021   Medication Sig Dispense Refill     acetaminophen (TYLENOL) 325 MG tablet 1-2 tablets every 8 hours as needed for pain or fever 90 tablet 0     Alcohol Swabs (ALCOHOL WIPES) 70 % PADS USE AS DIRECTED TO TEST BLOOD SUGARS 100 each 3     atenolol (TENORMIN) 25 MG tablet Take 1 tablet (25 mg) by mouth daily 90 tablet 1     augmented betamethasone dipropionate (DIPROLENE) 0.05 % external lotion Apply topically 2 times daily To affected area on ears and face for 2-3 weeks. Tapering with improvement and restarting with flares. 30 mL 1     blood glucose monitoring (ACCU-CHEK ANAMARIA PLUS) meter device kit Use to test blood sugar 1 times daily or as directed. 1 kit 0     cyanocobalamin (VITAMIN B-12) 1000 MCG tablet Take 1 tablet (1,000 mcg) by mouth every other day 90 tablet 2     fluocinonide (LIDEX) 0.05 % external solution Apply to affected area on scalp BID x 4-6 weeks, tapering with improvement. Do not apply to face or body folds. 60 mL 3     glimepiride (AMARYL) 1 MG tablet Take 1 tablet (1 mg) by mouth every  morning (before breakfast) 90 tablet 0     guaiFENesin (MUCINEX) 600 MG 12 hr tablet Take 2 tablets (1,200 mg) by mouth 2 times daily 28 tablet 0     ketoconazole (NIZORAL) 2 % external cream Apply topically 2 times daily To left foot for 4-6 weeks. 60 g 3     ketoconazole (NIZORAL) 2 % external shampoo Wet affected area daily, apply shampoo and lather, let sit for 3-5 minutes and then rinse. 120 mL 11     lisinopril (ZESTRIL) 10 MG tablet Take 1 tablet (10 mg) by mouth daily 90 tablet 1     metFORMIN (GLUCOPHAGE) 1000 MG tablet Take 0.5 tablets (500 mg) by mouth 2 times daily (with meals) 180 tablet 1     Omega-3 Fatty Acids (FISH OIL BURP-LESS) 1000 MG CAPS Take 1 capsule by mouth 2 times daily 180 capsule 0     Omega-3 Fish Oil 500 MG capsule Take 1-2 capsules (500-1,000 mg) by mouth daily 180 capsule 1     rosuvastatin (CRESTOR) 10 MG tablet Take 1 tablet (10 mg) by mouth daily 90 tablet 3     tamsulosin (FLOMAX) 0.4 MG capsule Take 1 capsule (0.4 mg) by mouth 2 times daily 180 capsule 0     urea (GORDONS UREA) 40 % external ointment Apply to yellow thick areas on feet 1-2x daily 30 g 3     vitamin D3 (CHOLECALCIFEROL) 50 mcg (2000 units) tablet Take 2 tablets (100 mcg) by mouth daily 180 tablet 0     No facility-administered encounter medications on file as of 10/27/2021.       Review Of Systems:  Skin: rash  Eyes: negative  Ears/Nose/Throat: negative  Respiratory: No shortness of breath, dyspnea on exertion, cough, or hemoptysis  Cardiovascular: negative  Gastrointestinal: negative  Genitourinary: negative  Musculoskeletal: negative  Neurologic: negative  Psychiatric: negative  Hematologic/Lymphatic/Immunologic: negative  Endocrine: negative      Objective:     There were no vitals taken for this visit.  Eyes: Conjunctivae/lids: Normal   ENT: Lips:  Normal  MSK: Normal  Cardiovascular: Peripheral edema none  Pulm: Breathing Normal  Neuro/Psych: Orientation: A/O x 3 Normal; Mood/Affect: Normal, NAD, WDWN  Pt  accompanied by: self  Following areas examined: face, neck, scalp, neck, feet, inguinal crease  Crane skin type:ii   Findings:  Flesh-colored verrucous appearing papule on right 3rd PIP  nml appearing scalp, ears, and beard  Yellow thickened skin on left plantar foot, left medial great toe  Hyperpigmentation of inguinal creases  Assessment and Plan:  1) tinea pedis, callus  When/if you flare begin ketoconazole 2x a day to feet  Continue urea cream 1-2x a day to thickened skin on feet     2) common wart x 1  Treatment options include Cimetidine, Aldara, Efudex, OTC topicals, wart peel, metaplast tape, candida injection, Bleomycin, cantharidin topical, cryo therapy, excision, and electrocautery. Disc multiple treatments usually needed.   LN2: Treated with LN2 for 5s for 1-2 cycles. Warned risks of blistering, pain, pigment change, scarring, and incomplete resolution.  Advised patient to return if lesions do not completely resolve within 2-3 months.  Wound care sheet given.       3) Seborrheic Dermatitis, localized pruritis  Chronic condition that cannot be cured, but it can be managed.      Scalp:   Wash scalp 2-3x a week with Nizoral: Wet affected area daily, apply shampoo and lather, let sit for 3-5 minutes and then rinse.   If multiple shampoos discussed begin  two prescription shampoos or one prescription shampoo and one over the counter shampoo  (examples: Head and shoulders, Selsen Blue, Ketoconazole, T-Sal, and/or T-gel.      Lidex: Apply a thin layer to affected area on scalp 2x a day x 4 weeks, tapering with improvement. Do not apply to face or body folds.      Face:   Wash affected area on beard and ears daily in the shower with nizoral shampoo  Apply betamethasone lotion to aa on ears and beard bid x 2-4 weeks.      Side effects of topical steroids including but not limited to atrophy (skin thinning), striae (stretch marks) telangiectasias, steroid acne, and others. Do not apply to normal skin. Do not  apply to discolored skin that does not have rash present.     4) tinea cruris, localized pruritis  Wash affected area( groin) in the shower a few times a week. Wet affected area daily, apply shampoo and lather, let sit for 3-5 minutes and then rinse.   Apply ketoconazole 2x a day for up to 4-6 weeks when flared  consider over the counter zeazorb AF powder to help keep area dry   It was a pleasure speaking with Jordan Miller today.   Follow up in yearly for refills. Sooner if flaring.         Again, thank you for allowing me to participate in the care of your patient.        Sincerely,        Amna Julian PA-C

## 2021-10-27 NOTE — PATIENT INSTRUCTIONS
Proper skin care from [facility]:    -Eliminate harsh soaps as they strip the natural oils from the skin, often resulting in dry itchy skin ( i.e. Dial, Zest, Natalia Spring)  -Use mild soaps such as Cetaphil or Dove Sensitive Skin in the shower. You do not need to use soap on arms, legs, and trunk every time you shower unless visibly soiled.   -Avoid hot or cold showers.  -After showering, lightly dry off and apply moisturizing within 2-3 minutes. This will help trap moisture in the skin.   -Aggressive use of a moisturizer at least 1-2 times a day to the entire body (including -Vanicream, Cetaphil, Aquaphor or Cerave) and moisturize hands after every washing.  -We recommend using moisturizers that come in a tub that needs to be scooped out, not a pump. This has more of an oil base. It will hold moisture in your skin much better than a water base moisturizer. The above recommended are non-pore clogging.      Wear a sunscreen with at least SPF 30 on your face, ears, neck and V of the chest daily. Wear sunscreen on other areas of the body if those areas are exposed to the sun throughout the day. Sunscreens can contain physical and/or chemical blockers. Physical blockers are less likely to clog pores, these include zinc oxide and titanium dioxide. Reapply every two hour and after swimming.     Sunscreen examples: https://www.ewg.org/sunscreen/    UV radiation  UVA radiation remains constant throughout the day and throughout the year. It is a longer wavelength than UVB and therefore penetrates deeper into the skin leading to immediate and delayed tanning, photoaging, and skin cancer. 70-80% of UVA and UVB radiation occurs between the hours of 10am-2pm.  UVB radiation  UVB radiation causes the most harmful effects and is more significant during the summer months. However, snow and ice can reflect UVB radiation leading to skin damage during the winter months as well. UVB radiation is responsible for tanning,  burning, inflammation, delayed erythema (pinkness), pigmentation (brown spots), and skin cancer.     I recommend self monthly full body exams and yearly full body exams with a dermatology provider. If you develop a new or changing lesion please follow up for examination. Most skin cancers are pink and scaly or pink and pearly. However, we do see blue/brown/black skin cancers.  Consider the ABCDEs of melanoma when giving yourself your monthly full body exam ( don't forget the groin, buttocks, feet, toes, etc). A-asymmetry, B-borders, C-color, D-diameter, E-elevation or evolving. If you see any of these changes please follow up in clinic. If you cannot see your back I recommend purchasing a hand held mirror to use with a larger wall mirror.            WARTS  Warts are caused by the Human Papilloma Virus.They are commonly spread by direct contact or autoinoculation. That mean if the wart is picked at it could spread to another area of skin.   In children without treatment 50% of warts will disappear spontaneous in 6 months, 90% are gone in 2 years. In adults they can last for a longer period of time, but they can resolve without treatment.   No method of treatment is better than the other. You just have to be consistent with your treatments and return every 4-6 weeks.   In between treatments you should use either salicylic acid or mediplast tape:    Mediplast tape: Cut to size of wart, leave tape on for 1 week, (Put duct tape over it to secure it). In 1 week, pare skin down with a pumice stone and repeat. You want to pare down until you see some pinpoint bleeding. Do this for 6-8 weeks, if no improvement, make follow up appt.     Wart stick can be purchased online. (Twice a day, warts turn white, then pare down with a pumice stone and repeat) Do this for 6-8 weeks, if no improvement, make a follow up appt.       WART TREATMENTS    Wart(s), or verruca vulgaris  are a very common, benign skin condition caused by a virus.   Since warts are caused by a viral infection, your own immune system will typically clear the lesion on average from several months to 2 years. Although wart(s) do not easily spread to others, the virus may still pass through direct contact (picking or scratching) and/or indirect contact (locker rooms/public showers).     It is important to remember that there is no cure for the wart virus. This means that warts can return at the same site or appear in a new spot.    Sometimes, it seems that new warts appear as fast as old ones go away. This happens when the old warts shed virus cells into the skin before the warts are treated. This allows new warts to grow around the first warts. The best way to prevent this is to have your dermatologist treat new warts as soon as they appear.    There are several technique/methods to making the wart(s) smaller in size or to help stimulate your immune system to clear the wart(s). The mainstay of treatment of wart(s) is to destroy the infected skin cells from the virus and to prevent recurrence.  The most important thing to remember is that regular consistent treatment is the most effective way to get rid of the wart.    Salicylic acid & Debridement   The first line treatment of warts is application of salicylic acid (with or without duct tape occlusion). Application of salicylic acid allows the wart(s) to stay flat and not callused. This allows other topical treatments to work better.      We recommend Wart Stick or Mediplast Tape over-the-counter   Directions: Apply the Wart Stick to the site twice daily or apply a piece of the Mediplast tape to the wart and cover tightly with tape to occlude the lesion.   Keep the medication on for 24 hours before removing/changing. Do this foe one week continuously.      After one week, when the salicylic acid is removed from the affected area the wart(s) area should turn the skin white/softened. Use an emery board/paring tool to rub off the  softened tissue before changing a new salicylic acid application. Make sure you dispose the emery board and do NOT reuse on another site.     Cryotherapy or Freezing   Freezing wart(s) with a very cold substance (liquid nitrogen) is an effective treatment for common warts. The wart(s) are frozen off to kill the viral activity in and around the affected area. The treated areas will become sore and/or red for the next few hours. Some adverse reactions of this treatment include: pain, blisters, blood blisters, infection, and/or lightening or darkening of the skin.   At times, when the wart is too thick and/or callused, the clinician will shave/pare down the thickened skin prior to spraying the wart(s) with liquid nitrogen.     Aldara  Cream   Aldara  Cream is a topical medication that activates your own immune system to help attack the cells infected with the virus. The cream works for resistant or recurrent warts that do not respond to freezing and/or salicylic acid.   Directions: Open the medication packet & apply to the affected area. Cover the lesion with a band-aid. The next morning, wash off the area with soap & water.  If the cream is going to work the wart(s) should get red and irritated.      Cantharidin (Canthacur/Cantharone)   Cantharidin is a chemical compound derived from a blister beetle. This liquid medication is applied to wart(s) in order to cause blistering, thus destroying the affected areas. At your  visit, application of cantharidin to the wart(s) is usually painless and the applied areas dry clear. Three to four hours after cantharidin is applied to the warts, you must wash off the area(s) with soap and water. Adverse reactions such as redness, tenderness, blistering, itching and burning sensations may occur within 2-3 days. It is expected to take 2-4 weeks for the treated areas to heal. Please follow up your provider in 6 weeks to reassess the treated areas.     Electrosurgery and  "curettage  Electrosurgery (burning) and Curettage (scraping off) of the wart(s) are often are used together. The dermatologist may remove the wart by scraping it off before or after electrosurgery. Some adverse reactions of this treatment include: pain, scarring, infection, and/or lightening or darkening of the skin.    Excision  Cutting out the wart is another option for wart treatment.  Some adverse reactions of this treatment include: pain, scarring, infection, and/or lightening or darkening of the skin.    TREATMENT RESISTANT WARTS    If the warts are hard-to-treat, we may use one of the following treatments:     Laser treatment  Laser treatment is an option, mainly for warts that have not responded to other therapies. Some adverse reactions of this treatment include: pain, scarring, infection, and/or lightening or darkening of the skin.    Chemical peels  When flat warts appear, there are usually many warts. Because so many warts appear, dermatologists often prescribe \"peeling\" methods to treat these warts. This means, either the doctor or you will apply a peeling medicine every couple weeks or at home every day. Peeling medicines include salicylic acid (stronger than you can buy at the store), tretinoin, and glycolic acid.Some adverse reactions of this treatment include: pain, scarring, infection, and/or lightening or darkening of the skin.    Bleomycin  The wart may be injected with an anti-cancer medicine, bleomycin. The shots may hurt. Some adverse reactions of this treatment include: pain, scarring, infection, and/or lightening or darkening of the skin, or nail loss if given in the fingers.    Immunotherapy  This treatment uses the patient s own immune system to fight the warts. This treatment is used when the warts remain despite other treatments. There are two types:     i) one type of immunotherapy involves applying a chemical, such as diphencyprone (DCP), to the warts. A mild allergic reaction occurs " around the treated warts. This reaction may cause the warts to go away.     ii) Another type of immunotherapy involves getting shots of Syed antigen.  The shots can boost       WOUND CARE INSTRUCTIONS  FOR CRYOSURGERY        This area treated with liquid nitrogen will form a blister. You do not need to bandage the area until after the blister forms and breaks (which may be a few days).  When the blister breaks, begin daily dressing changes as follows:    1) Clean and dry the area with tap water using clean Q-tip or sterile gauze pad.    2) Apply Aquaphor, Vaseline, Polysporin ointment or Bacitracin ointment over entire wound.  Do NOT use Neosporin ointment.    3) Cover the wound with a band-aid or sterile non-stick gauze pad and micropore paper tape.      REPEAT THESE INSTRUCTIONS AT LEAST ONCE A DAY UNTIL THE WOUND HAS COMPLETELY HEALED.        It is an old wives tale that a wound heals better when it is exposed to air and allowed to dry out. The wound will heal faster with a better cosmetic result if it is kept moist with ointment and covered with a bandage.  Do not let the wound dry out.      Supplies Needed:     *Cotton tipped applicators (Q-tips)   *Aquaphor, Vaseline, Polysporin ointment or Bacitracin ointment (NOT NEOSPORIN)   *Band-aids, or non stick gauze pads and micropore paper tape    PATIENT INFORMATION    During the healing process you will notice a number of changes. All wounds develop a small halo of redness surrounding the wound.  This means healing is occurring. Severe itching with extensive redness usually indicates sensitivity to the ointment or bandage tape used to dress the wound.  You should call our office if this develops.      Swelling and/or discoloration around your surgical site is common, particularly when performed around the eye.    All wounds normally drain.  The larger the wound the more drainage there will be.  After 7-10 days, you will notice the wound beginning to shrink and  new skin will begin to grow.  The wound is healed when you can see skin has formed over the entire area.  A healed wound has a healthy, shiny look to the surface and is red to dark pink in color to normalize.  Wounds may take approximately 4-6 weeks to heal.  Larger wounds may take 6-8 weeks.  After the wound is healed you may discontinue dressing changes.    You may experience a sensation of tightness as your wound heals. This is normal and will gradually subside.    Your healed wound may be sensitive to temperature changes. This sensitivity improves with time, but if you re having a lot of discomfort, try to avoid temperature extremes.    Patients frequently experience itching after their wound appears to have healed because of the continue healing under the skin.  Plain Vaseline will help relieve the itching.           1) tinea pedis, callus  When foot fungus returns begin ketoconazole 2x a day to feet  Continue urea cream 1-2x a day to thickened skin on feet     2) intertrigo( groin)  Wash affected area( groin) in the shower a few times a week. Wet affected area daily, apply shampoo and lather, let sit for 3-5 minutes and then rinse.   Apply ketoconazole 2x a day for up to 4-6 weeks when flared  consider over the counter zeazorb AF powder to help keep area dry      3) Seborrheic Dermatitis, localized pruritis  Chronic condition that cannot be cured, but it can be managed.      Scalp:   Wash scalp daily with Nizoral: Wet affected area daily, apply shampoo and lather, let sit for 3-5 minutes and then rinse.   If multiple shampoos discussed begin  two prescription shampoos or one prescription shampoo and one over the counter shampoo  (examples: Head and shoulders, Selsen Blue, Ketoconazole, T-Sal, and/or T-gel.      Lidex: Apply a thin layer to affected area on scalp 2x a day x 4 weeks, tapering with improvement. Do not apply to face or body folds.      Face:   Wash affected area on beard and ears daily in the  shower with nizoral shampoo  Apply betamethasone lotion to aa on ears and beard bid x 2-4 weeks.      Side effects of topical steroids including but not limited to atrophy (skin thinning), striae (stretch marks) telangiectasias, steroid acne, and others. Do not apply to normal skin. Do not apply to discolored skin that does not have rash present.

## 2021-10-27 NOTE — PROGRESS NOTES
HPI:  Jordan Miller is a 66 year old male patient here today for follow up seb derm of ears, scalp and beard with great improvement with Nizoral and lidex a few times a week.  Also here for follow up tinea of left foot and callus of feet. Using ketoconazole cream and urea with great improvement. Also has a rash on groin treating with medication from [facility]. Not sure what the name is.  Patient states this has been present for a while.  Patient reports the following symptoms: itch, rash .  Patient reports the following previous treatments: topical.  Patient reports the following modifying factors: none.  Associated symptoms: none.  Patient has no other skin complaints today.  Remainder of the HPI, Meds, PMH, Allergies, FH, and SH was reviewed in chart.      Past Medical History:   Diagnosis Date     BPH (benign prostatic hyperplasia)      Hyperlipidemia      Hypertension      Left ankle pain      Type 2 diabetes mellitus (H)        Past Surgical History:   Procedure Laterality Date     CHOLECYSTECTOMY      1994     HERNIA REPAIR, UMBILICAL      ?2013        History reviewed. No pertinent family history.    Social History     Socioeconomic History     Marital status:      Spouse name: Not on file     Number of children: Not on file     Years of education: Not on file     Highest education level: Not on file   Occupational History     Not on file   Tobacco Use     Smoking status: Never Smoker     Smokeless tobacco: Never Used   Substance and Sexual Activity     Alcohol use: Yes     Comment: 2-3 drinks a week hard liquor     Drug use: Never     Sexual activity: Not on file   Other Topics Concern     Not on file   Social History Narrative     Not on file     Social Determinants of Health     Financial Resource Strain:      Difficulty of Paying Living Expenses:    Food Insecurity:      Worried About Running Out of Food in the Last Year:      Ran Out of Food in the Last Year:    Transportation Needs:      Lack of  Transportation (Medical):      Lack of Transportation (Non-Medical):    Physical Activity:      Days of Exercise per Week:      Minutes of Exercise per Session:    Stress:      Feeling of Stress :    Social Connections:      Frequency of Communication with Friends and Family:      Frequency of Social Gatherings with Friends and Family:      Attends [employer] Services:      Active Member of Clubs or Organizations:      Attends Club or Organization Meetings:      Marital Status:    Intimate Partner Violence:      Fear of Current or Ex-Partner:      Emotionally Abused:      Physically Abused:      Sexually Abused:        Outpatient Encounter Medications as of 10/27/2021   Medication Sig Dispense Refill     acetaminophen (TYLENOL) 325 MG tablet 1-2 tablets every 8 hours as needed for pain or fever 90 tablet 0     Alcohol Swabs (ALCOHOL WIPES) 70 % PADS USE AS DIRECTED TO TEST BLOOD SUGARS 100 each 3     atenolol (TENORMIN) 25 MG tablet Take 1 tablet (25 mg) by mouth daily 90 tablet 1     augmented betamethasone dipropionate (DIPROLENE) 0.05 % external lotion Apply topically 2 times daily To affected area on ears and face for 2-3 weeks. Tapering with improvement and restarting with flares. 30 mL 1     blood glucose monitoring (ACCU-CHEK ANAMARIA PLUS) meter device kit Use to test blood sugar 1 times daily or as directed. 1 kit 0     cyanocobalamin (VITAMIN B-12) 1000 MCG tablet Take 1 tablet (1,000 mcg) by mouth every other day 90 tablet 2     fluocinonide (LIDEX) 0.05 % external solution Apply to affected area on scalp BID x 4-6 weeks, tapering with improvement. Do not apply to face or body folds. 60 mL 3     glimepiride (AMARYL) 1 MG tablet Take 1 tablet (1 mg) by mouth every morning (before breakfast) 90 tablet 0     guaiFENesin (MUCINEX) 600 MG 12 hr tablet Take 2 tablets (1,200 mg) by mouth 2 times daily 28 tablet 0     ketoconazole (NIZORAL) 2 % external cream Apply topically 2 times daily To left foot for 4-6 weeks.  60 g 3     ketoconazole (NIZORAL) 2 % external shampoo Wet affected area daily, apply shampoo and lather, let sit for 3-5 minutes and then rinse. 120 mL 11     lisinopril (ZESTRIL) 10 MG tablet Take 1 tablet (10 mg) by mouth daily 90 tablet 1     metFORMIN (GLUCOPHAGE) 1000 MG tablet Take 0.5 tablets (500 mg) by mouth 2 times daily (with meals) 180 tablet 1     Omega-3 Fatty Acids (FISH OIL BURP-LESS) 1000 MG CAPS Take 1 capsule by mouth 2 times daily 180 capsule 0     Omega-3 Fish Oil 500 MG capsule Take 1-2 capsules (500-1,000 mg) by mouth daily 180 capsule 1     rosuvastatin (CRESTOR) 10 MG tablet Take 1 tablet (10 mg) by mouth daily 90 tablet 3     tamsulosin (FLOMAX) 0.4 MG capsule Take 1 capsule (0.4 mg) by mouth 2 times daily 180 capsule 0     urea (GORDONS UREA) 40 % external ointment Apply to yellow thick areas on feet 1-2x daily 30 g 3     vitamin D3 (CHOLECALCIFEROL) 50 mcg (2000 units) tablet Take 2 tablets (100 mcg) by mouth daily 180 tablet 0     No facility-administered encounter medications on file as of 10/27/2021.       Review Of Systems:  Skin: rash  Eyes: negative  Ears/Nose/Throat: negative  Respiratory: No shortness of breath, dyspnea on exertion, cough, or hemoptysis  Cardiovascular: negative  Gastrointestinal: negative  Genitourinary: negative  Musculoskeletal: negative  Neurologic: negative  Psychiatric: negative  Hematologic/Lymphatic/Immunologic: negative  Endocrine: negative      Objective:     There were no vitals taken for this visit.  Eyes: Conjunctivae/lids: Normal   ENT: Lips:  Normal  MSK: Normal  Cardiovascular: Peripheral edema none  Pulm: Breathing Normal  Neuro/Psych: Orientation: A/O x 3 Normal; Mood/Affect: Normal, NAD, WDWN  Pt accompanied by: self  Following areas examined: face, neck, scalp, neck, feet, inguinal crease  Crane skin type:ii   Findings:  Flesh-colored verrucous appearing papule on right 3rd PIP  nml appearing scalp, ears, and beard  Yellow thickened skin  on left plantar foot, left medial great toe  Hyperpigmentation of inguinal creases  Assessment and Plan:  1) tinea pedis, callus  When/if you flare begin ketoconazole 2x a day to feet  Continue urea cream 1-2x a day to thickened skin on feet     2) common wart x 1  Treatment options include Cimetidine, Aldara, Efudex, OTC topicals, wart peel, metaplast tape, candida injection, Bleomycin, cantharidin topical, cryo therapy, excision, and electrocautery. Disc multiple treatments usually needed.   LN2: Treated with LN2 for 5s for 1-2 cycles. Warned risks of blistering, pain, pigment change, scarring, and incomplete resolution.  Advised patient to return if lesions do not completely resolve within 2-3 months.  Wound care sheet given.       3) Seborrheic Dermatitis, localized pruritis  Chronic condition that cannot be cured, but it can be managed.      Scalp:   Wash scalp 2-3x a week with Nizoral: Wet affected area daily, apply shampoo and lather, let sit for 3-5 minutes and then rinse.   If multiple shampoos discussed begin  two prescription shampoos or one prescription shampoo and one over the counter shampoo  (examples: Head and shoulders, Selsen Blue, Ketoconazole, T-Sal, and/or T-gel.      Lidex: Apply a thin layer to affected area on scalp 2x a day x 4 weeks, tapering with improvement. Do not apply to face or body folds.      Face:   Wash affected area on beard and ears daily in the shower with nizoral shampoo  Apply betamethasone lotion to aa on ears and beard bid x 2-4 weeks.      Side effects of topical steroids including but not limited to atrophy (skin thinning), striae (stretch marks) telangiectasias, steroid acne, and others. Do not apply to normal skin. Do not apply to discolored skin that does not have rash present.     4) tinea cruris, localized pruritis  Wash affected area( groin) in the shower a few times a week. Wet affected area daily, apply shampoo and lather, let sit for 3-5 minutes and then rinse.    Apply ketoconazole 2x a day for up to 4-6 weeks when flared  consider over the counter zeazorb AF powder to help keep area dry   It was a pleasure speaking with Jordan Miller today.   Follow up in yearly for refills. Sooner if flaring.

## 2021-10-28 ENCOUNTER — HOSPITAL ENCOUNTER (OUTPATIENT)
Dept: PHYSICAL THERAPY | Facility: REHABILITATION | Age: 66
End: 2021-10-28
Payer: MEDICARE

## 2021-10-28 DIAGNOSIS — M54.2 NECK PAIN: ICD-10-CM

## 2021-10-28 DIAGNOSIS — G89.29 CHRONIC PAIN OF BOTH KNEES: Primary | ICD-10-CM

## 2021-10-28 DIAGNOSIS — M25.562 CHRONIC PAIN OF BOTH KNEES: Primary | ICD-10-CM

## 2021-10-28 DIAGNOSIS — M25.561 CHRONIC PAIN OF BOTH KNEES: Primary | ICD-10-CM

## 2021-10-28 PROCEDURE — 97140 MANUAL THERAPY 1/> REGIONS: CPT | Mod: GP | Performed by: PHYSICAL THERAPIST

## 2021-11-01 ENCOUNTER — HOSPITAL ENCOUNTER (OUTPATIENT)
Dept: PHYSICAL THERAPY | Facility: REHABILITATION | Age: 66
End: 2021-11-01
Payer: MEDICAID

## 2021-11-01 PROCEDURE — 97140 MANUAL THERAPY 1/> REGIONS: CPT | Mod: GP | Performed by: PHYSICAL THERAPIST

## 2021-11-04 ENCOUNTER — HOSPITAL ENCOUNTER (OUTPATIENT)
Dept: PHYSICAL THERAPY | Facility: REHABILITATION | Age: 66
End: 2021-11-04
Payer: MEDICAID

## 2021-11-04 DIAGNOSIS — G89.29 CHRONIC PAIN OF BOTH KNEES: Primary | ICD-10-CM

## 2021-11-04 DIAGNOSIS — M54.2 NECK PAIN: ICD-10-CM

## 2021-11-04 DIAGNOSIS — M25.562 CHRONIC PAIN OF BOTH KNEES: Primary | ICD-10-CM

## 2021-11-04 DIAGNOSIS — M25.561 CHRONIC PAIN OF BOTH KNEES: Primary | ICD-10-CM

## 2021-11-04 PROCEDURE — 97140 MANUAL THERAPY 1/> REGIONS: CPT | Mod: GP | Performed by: PHYSICAL THERAPIST

## 2021-11-19 ENCOUNTER — HOSPITAL ENCOUNTER (OUTPATIENT)
Dept: PHYSICAL THERAPY | Facility: REHABILITATION | Age: 66
End: 2021-11-19
Payer: MEDICAID

## 2021-11-19 DIAGNOSIS — G89.29 CHRONIC PAIN OF BOTH KNEES: Primary | ICD-10-CM

## 2021-11-19 DIAGNOSIS — M25.562 CHRONIC PAIN OF BOTH KNEES: Primary | ICD-10-CM

## 2021-11-19 DIAGNOSIS — M54.2 NECK PAIN: ICD-10-CM

## 2021-11-19 DIAGNOSIS — R79.89 LOW VITAMIN D LEVEL: ICD-10-CM

## 2021-11-19 DIAGNOSIS — M25.561 CHRONIC PAIN OF BOTH KNEES: Primary | ICD-10-CM

## 2021-11-19 PROCEDURE — 97140 MANUAL THERAPY 1/> REGIONS: CPT | Mod: GP | Performed by: PHYSICAL THERAPIST

## 2021-11-19 RX ORDER — CHOLECALCIFEROL (VITAMIN D3) 50 MCG
2 TABLET ORAL DAILY
Qty: 180 TABLET | Refills: 2 | Status: SHIPPED | OUTPATIENT
Start: 2021-11-19 | End: 2022-06-08

## 2021-11-20 DIAGNOSIS — L21.9 DERMATITIS, SEBORRHEIC: ICD-10-CM

## 2021-11-22 DIAGNOSIS — L21.9 SEBORRHEIC DERMATITIS: Primary | ICD-10-CM

## 2021-11-22 RX ORDER — BETAMETHASONE DIPROPIONATE 0.5 MG/ML
LOTION, AUGMENTED TOPICAL 2 TIMES DAILY
Qty: 30 ML | Refills: 1 | OUTPATIENT
Start: 2021-11-22

## 2021-11-22 RX ORDER — BETAMETHASONE DIPROPIONATE 0.5 MG/G
LOTION TOPICAL DAILY
Qty: 60 ML | Refills: 0 | Status: SHIPPED | OUTPATIENT
Start: 2021-11-22 | End: 2022-01-10

## 2021-12-10 ENCOUNTER — PATIENT OUTREACH (OUTPATIENT)
Dept: GERIATRIC MEDICINE | Facility: CLINIC | Age: 66
End: 2021-12-10
Payer: COMMERCIAL

## 2021-12-10 NOTE — LETTER
December 10, 2021    JORDAN LOZANO  [address] [place] [address]    Dear  Jordan,    Welcome to [facility] [place] [facility] Plus (MSC+) health program. My name is JD Srivastava. I am your MSC+ care coordinator. You are eligible for Care Coordination through [facility] Product.    Here is how Care Coordination works:    I will meet with you in person to determine your care coordination needs    We will develop a plan of care to meet your needs    We will create a service plan showing the services you will receive    We will talk about and coordinate any preventive care needs you have    I will call you soon to see how you are doing and determine what needs you may have. Our goal is to keep you as healthy and independent as possible.     Soon, you will receive a new [facility] member identification (ID) card from [facility]. When you receive it, please use this card along with your [place] Health Care Programs card and Prescription Drug Coverage Program card. When you receive, it please use this card where you get your health services. If you have Medicare, you will need to show your Medicare card when you get health services.    Being in the [place] [facility] Plus (MSC+) Care Coordination program is voluntary and offered to you at no cost. If you ever wish to stop being in the Care Coordination program or have questions, call me at [phone]. If you reach my voice mail, leave a message and your phone number. If you are hearing impaired, please call the [place] Relay at 726 or [phone] (vfdyet-br-[id number] relay service).    Sincerely,      JD Srivastava  [phone]  [email or web address]    [id number] accepted   (12/2019)

## 2021-12-10 NOTE — PROGRESS NOTES
[facility] Care Coordination Contact    Member became effective with [facility] on 12/01/21 with Pily MSC+.  Previous Health Plan: MA/Fee For Service  Previous Care System: n/a  Previous care coordinators name and number: n/a  Waiver Type: N/A  Last MMIS Entry: Date n/a and Type n/a  MMIS visit date (and type) if different from above: n/a  Services Listed in MMIS:   UTF received: No: Requested on - no UTF requested, member does not have any previous MMIS entries and no prev health plan.   Hiwot Pierre  Case Management Specialist  [facility]  [phone]

## 2021-12-15 ENCOUNTER — PATIENT OUTREACH (OUTPATIENT)
Dept: GERIATRIC MEDICINE | Facility: CLINIC | Age: 66
End: 2021-12-15
Payer: COMMERCIAL

## 2021-12-15 NOTE — PROGRESS NOTES
[facility] Care Coordination Contact    Care Coordinator spoke to Daughter Christine.    She said that she would call Care Coordinator back on 12/16/21 after 10 AM to discuss parents needs.     JD Srivastava  [facility]  Phone: [phone]

## 2021-12-16 ENCOUNTER — PATIENT OUTREACH (OUTPATIENT)
Dept: GERIATRIC MEDICINE | Facility: CLINIC | Age: 66
End: 2021-12-16
Payer: COMMERCIAL

## 2021-12-16 NOTE — PROGRESS NOTES
[facility] Care Coordination Contact    Care Coordinator called Daughter Christine again.   She reported that it was not a good time to talk and she would call later.      Care Coordinator left message for Jodran asking for him to call back.       Care Coordinator sent email to Jordan introducing self and asking if he or wife need any supportive services at this time.     Fely Brown [facility]  [facility]  Phone: [phone]

## 2021-12-17 ENCOUNTER — PATIENT OUTREACH (OUTPATIENT)
Dept: GERIATRIC MEDICINE | Facility: CLINIC | Age: 66
End: 2021-12-17
Payer: COMMERCIAL

## 2021-12-17 NOTE — PROGRESS NOTES
[facility] Care Coordination Contact    Care Coordinator left message for Daughter Opinder asking for call back regarding services for Jordan.     JD Srivastava  [facility]  Phone: [phone]

## 2021-12-20 DIAGNOSIS — R00.0 TACHYCARDIA: ICD-10-CM

## 2021-12-20 DIAGNOSIS — I10 HYPERTENSION, UNSPECIFIED TYPE: ICD-10-CM

## 2021-12-20 DIAGNOSIS — N13.8 BENIGN PROSTATIC HYPERPLASIA WITH URINARY OBSTRUCTION: ICD-10-CM

## 2021-12-20 DIAGNOSIS — N40.1 BENIGN PROSTATIC HYPERPLASIA WITH URINARY OBSTRUCTION: ICD-10-CM

## 2021-12-21 ENCOUNTER — PATIENT OUTREACH (OUTPATIENT)
Dept: GERIATRIC MEDICINE | Facility: CLINIC | Age: 66
End: 2021-12-21
Payer: COMMERCIAL

## 2021-12-21 NOTE — PROGRESS NOTES
[facility] Care Coordination Contact      [facility] Refusal Telephone Assessment    Member refused home visit HRA on 12/21/21 (reason: Refused services, reports healthy and does not need additional support).    ER visits: No  Hospitalizations: No  Health concerns: None  Falls/Injuries: No  ADL/IADL Dependencies: None        Member currently receiving the following home care services: None    Member currently receiving the following community resources:    Informal support(s):  None    Advanced Care Planning discussion, complete code section.    [facility] Plan sponsored benefits: Shared information re: Silver Sneakers/gym memberships, ASA, Calcium +D.    Follow-Up Plan: Member informed of future contact, plan to f/u with member with a 6 month telephone assessment and offer a home visit.  Contact information shared with member and family, encouraged member to call with any questions or concerns at any time.    JD Srivastava  [facility]  Phone: [phone]

## 2021-12-22 RX ORDER — ATENOLOL 25 MG/1
25 TABLET ORAL DAILY
Qty: 90 TABLET | Refills: 2 | Status: SHIPPED | OUTPATIENT
Start: 2021-12-22 | End: 2022-05-12

## 2021-12-22 RX ORDER — TAMSULOSIN HYDROCHLORIDE 0.4 MG/1
0.4 CAPSULE ORAL
Qty: 180 CAPSULE | Refills: 2 | Status: SHIPPED | OUTPATIENT
Start: 2021-12-22 | End: 2023-05-12

## 2021-12-27 DIAGNOSIS — L84 CALLUS: ICD-10-CM

## 2021-12-28 ENCOUNTER — TELEPHONE (OUTPATIENT)
Dept: GERIATRIC MEDICINE | Facility: CLINIC | Age: 66
End: 2021-12-28
Payer: COMMERCIAL

## 2021-12-28 NOTE — TELEPHONE ENCOUNTER
Routing refill request to provider for review/approval because:  Drug not on the [facility] refill protocol   Atiya Nice RN

## 2021-12-29 NOTE — TELEPHONE ENCOUNTER
Erroneous encounter    Fely Brown, [facility]  Phone: [phone]

## 2021-12-31 ENCOUNTER — TELEPHONE (OUTPATIENT)
Dept: FAMILY MEDICINE | Facility: CLINIC | Age: 66
End: 2021-12-31
Payer: COMMERCIAL

## 2021-12-31 NOTE — TELEPHONE ENCOUNTER
Prior Authorization Retail Medication Request    Medication/Dose: urea 40 % cream  ICD code (if different than what is on RX):    Previously Tried and Failed: no  Rationale: n/a    Insurance Name:  [employer]  Insurance ID:  [id number]      Pharmacy Information (if different than what is on RX)  Name: [facility]  Phone:  [phone]    Chel Sofia  Pharmacy Technician  [facility]  [facility]armacy  [phone]    Thank you

## 2022-01-03 DIAGNOSIS — E11.9 TYPE 2 DIABETES MELLITUS WITHOUT COMPLICATION, WITHOUT LONG-TERM CURRENT USE OF INSULIN (H): Primary | ICD-10-CM

## 2022-01-03 NOTE — PROGRESS NOTES
Patient on our lab schedule for tomorrow. Appt notes say  [facility] diabetic labs. Please review and place orders as needed. Thanks

## 2022-01-04 ENCOUNTER — TELEPHONE (OUTPATIENT)
Dept: LAB | Facility: CLINIC | Age: 67
End: 2022-01-04

## 2022-01-04 ENCOUNTER — LAB (OUTPATIENT)
Dept: LAB | Facility: CLINIC | Age: 67
End: 2022-01-04
Payer: MEDICARE

## 2022-01-04 DIAGNOSIS — R79.89 LOW VITAMIN B12 LEVEL: Primary | ICD-10-CM

## 2022-01-04 DIAGNOSIS — E11.9 TYPE 2 DIABETES MELLITUS WITHOUT COMPLICATION, WITHOUT LONG-TERM CURRENT USE OF INSULIN (H): ICD-10-CM

## 2022-01-04 DIAGNOSIS — Z12.5 SCREENING PSA (PROSTATE SPECIFIC ANTIGEN): ICD-10-CM

## 2022-01-04 DIAGNOSIS — R79.89 LOW VITAMIN D LEVEL: ICD-10-CM

## 2022-01-04 DIAGNOSIS — R79.89 LOW VITAMIN B12 LEVEL: ICD-10-CM

## 2022-01-04 LAB
ALBUMIN SERPL-MCNC: 3.9 G/DL (ref 3.5–5)
ALP SERPL-CCNC: 55 U/L (ref 45–120)
ALT SERPL W P-5'-P-CCNC: 18 U/L (ref 0–45)
ANION GAP SERPL CALCULATED.3IONS-SCNC: 7 MMOL/L (ref 5–18)
AST SERPL W P-5'-P-CCNC: 18 U/L (ref 0–40)
BILIRUB SERPL-MCNC: 0.6 MG/DL (ref 0–1)
BUN SERPL-MCNC: 13 MG/DL (ref 8–22)
CALCIUM SERPL-MCNC: 9.4 MG/DL (ref 8.5–10.5)
CHLORIDE BLD-SCNC: 106 MMOL/L (ref 98–107)
CHOLEST SERPL-MCNC: 201 MG/DL
CO2 SERPL-SCNC: 25 MMOL/L (ref 22–31)
CREAT SERPL-MCNC: 0.97 MG/DL (ref 0.7–1.3)
CREAT UR-MCNC: 23 MG/DL
FASTING STATUS PATIENT QL REPORTED: ABNORMAL
GFR SERPL CREATININE-BSD FRML MDRD: 87 ML/MIN/1.73M2
GLUCOSE BLD-MCNC: 121 MG/DL (ref 70–125)
HBA1C MFR BLD: 6.6 % (ref 0–5.6)
HDLC SERPL-MCNC: 71 MG/DL
HOLD SPECIMEN: NORMAL
LDLC SERPL CALC-MCNC: 116 MG/DL
MICROALBUMIN UR-MCNC: <0.5 MG/DL (ref 0–1.99)
MICROALBUMIN/CREAT UR: NORMAL MG/G{CREAT}
POTASSIUM BLD-SCNC: 4.3 MMOL/L (ref 3.5–5)
PROT SERPL-MCNC: 6.8 G/DL (ref 6–8)
PSA SERPL-MCNC: 1.18 UG/L (ref 0–4.5)
SODIUM SERPL-SCNC: 138 MMOL/L (ref 136–145)
TRIGL SERPL-MCNC: 70 MG/DL
VIT B12 SERPL-MCNC: 766 PG/ML (ref 213–816)

## 2022-01-04 PROCEDURE — 82607 VITAMIN B-12: CPT

## 2022-01-04 PROCEDURE — 80053 COMPREHEN METABOLIC PANEL: CPT

## 2022-01-04 PROCEDURE — 80061 LIPID PANEL: CPT

## 2022-01-04 PROCEDURE — 36415 COLL VENOUS BLD VENIPUNCTURE: CPT

## 2022-01-04 PROCEDURE — 82306 VITAMIN D 25 HYDROXY: CPT

## 2022-01-04 PROCEDURE — 83036 HEMOGLOBIN GLYCOSYLATED A1C: CPT

## 2022-01-04 PROCEDURE — 82043 UR ALBUMIN QUANTITATIVE: CPT

## 2022-01-04 PROCEDURE — G0103 PSA SCREENING: HCPCS

## 2022-01-04 NOTE — TELEPHONE ENCOUNTER
Patient is at lab now and requesting additional labs that were not ordered.  Advised that since they were just done on 8/31 it may not be appropriate to reorder.  Patient is adamant they be ordered and is waiting at lab now.    Spoke with lab person who also states patient requesting additional labs.  Patient was already drawn for CMP, Hemoglobin A1C, Lipid panel    Pended 3 additional requested orders for review.  1.  PSA  2. Vitamin D Deficiency  3.  Vitamin B12    Chiara Dunlap RN

## 2022-01-04 NOTE — PROGRESS NOTES
Patient is at our clinic right now for a blood draw for diabetic labs.  He also would like to have his Vitamin D level, Vitamin B12 level, and PSA checked.  I informed patient that it looks like he is not due for these labs but he is adamant these labs get done.  He does not want to be drawn for the diabetic labs until these other orders are placed.  Please review and place order as soon as possible.  Thanks!

## 2022-01-05 LAB — DEPRECATED CALCIDIOL+CALCIFEROL SERPL-MC: 59 UG/L (ref 30–80)

## 2022-01-10 ENCOUNTER — OFFICE VISIT (OUTPATIENT)
Dept: DERMATOLOGY | Facility: CLINIC | Age: 67
End: 2022-01-10
Payer: MEDICARE

## 2022-01-10 VITALS — HEART RATE: 75 BPM | OXYGEN SATURATION: 97 % | SYSTOLIC BLOOD PRESSURE: 117 MMHG | DIASTOLIC BLOOD PRESSURE: 65 MMHG

## 2022-01-10 DIAGNOSIS — B35.1 ONYCHOMYCOSIS: Primary | ICD-10-CM

## 2022-01-10 DIAGNOSIS — L29.9 LOCALIZED PRURITUS: ICD-10-CM

## 2022-01-10 DIAGNOSIS — L21.9 SEBORRHEIC DERMATITIS: ICD-10-CM

## 2022-01-10 DIAGNOSIS — L84 CALLUS: ICD-10-CM

## 2022-01-10 DIAGNOSIS — L21.9 DERMATITIS, SEBORRHEIC: ICD-10-CM

## 2022-01-10 DIAGNOSIS — B07.8 COMMON WART: ICD-10-CM

## 2022-01-10 DIAGNOSIS — B35.6 TINEA CRURIS: ICD-10-CM

## 2022-01-10 PROCEDURE — 17110 DESTRUCTION B9 LES UP TO 14: CPT | Performed by: PHYSICIAN ASSISTANT

## 2022-01-10 PROCEDURE — 99214 OFFICE O/P EST MOD 30 MIN: CPT | Mod: 25 | Performed by: PHYSICIAN ASSISTANT

## 2022-01-10 RX ORDER — KETOCONAZOLE 20 MG/ML
SHAMPOO TOPICAL
Qty: 120 ML | Refills: 11 | Status: SHIPPED | OUTPATIENT
Start: 2022-01-10 | End: 2022-05-25

## 2022-01-10 RX ORDER — BETAMETHASONE DIPROPIONATE 0.5 MG/G
LOTION TOPICAL DAILY
Qty: 60 ML | Refills: 0 | Status: SHIPPED | OUTPATIENT
Start: 2022-01-10 | End: 2022-01-13

## 2022-01-10 RX ORDER — CICLOPIROX 80 MG/ML
SOLUTION TOPICAL
Qty: 6.6 ML | Refills: 4 | Status: SHIPPED | OUTPATIENT
Start: 2022-01-10 | End: 2022-05-25

## 2022-01-10 RX ORDER — FLUOCINONIDE TOPICAL SOLUTION USP, 0.05% 0.5 MG/ML
SOLUTION TOPICAL
Qty: 60 ML | Refills: 3 | Status: SHIPPED | OUTPATIENT
Start: 2022-01-10 | End: 2022-05-25

## 2022-01-10 NOTE — PATIENT INSTRUCTIONS
Proper skin care from [facility]:    -Eliminate harsh soaps as they strip the natural oils from the skin, often resulting in dry itchy skin ( i.e. Dial, Zest, Natalia Spring)  -Use mild soaps such as Cetaphil or Dove Sensitive Skin in the shower. You do not need to use soap on arms, legs, and trunk every time you shower unless visibly soiled.   -Avoid hot or cold showers.  -After showering, lightly dry off and apply moisturizing within 2-3 minutes. This will help trap moisture in the skin.   -Aggressive use of a moisturizer at least 1-2 times a day to the entire body (including -Vanicream, Cetaphil, Aquaphor or Cerave) and moisturize hands after every washing.  -We recommend using moisturizers that come in a tub that needs to be scooped out, not a pump. This has more of an oil base. It will hold moisture in your skin much better than a water base moisturizer. The above recommended are non-pore clogging.      Wear a sunscreen with at least SPF 30 on your face, ears, neck and V of the chest daily. Wear sunscreen on other areas of the body if those areas are exposed to the sun throughout the day. Sunscreens can contain physical and/or chemical blockers. Physical blockers are less likely to clog pores, these include zinc oxide and titanium dioxide. Reapply every two hour and after swimming.     Sunscreen examples: https://www.ewg.org/sunscreen/    UV radiation  UVA radiation remains constant throughout the day and throughout the year. It is a longer wavelength than UVB and therefore penetrates deeper into the skin leading to immediate and delayed tanning, photoaging, and skin cancer. 70-80% of UVA and UVB radiation occurs between the hours of 10am-2pm.  UVB radiation  UVB radiation causes the most harmful effects and is more significant during the summer months. However, snow and ice can reflect UVB radiation leading to skin damage during the winter months as well. UVB radiation is responsible for tanning,  burning, inflammation, delayed erythema (pinkness), pigmentation (brown spots), and skin cancer.     I recommend self monthly full body exams and yearly full body exams with a dermatology provider. If you develop a new or changing lesion please follow up for examination. Most skin cancers are pink and scaly or pink and pearly. However, we do see blue/brown/black skin cancers.  Consider the ABCDEs of melanoma when giving yourself your monthly full body exam ( don't forget the groin, buttocks, feet, toes, etc). A-asymmetry, B-borders, C-color, D-diameter, E-elevation or evolving. If you see any of these changes please follow up in clinic. If you cannot see your back I recommend purchasing a hand held mirror to use with a larger wall mirror.          1)callus (Feet)  Continue urea cream 1-2x a day to thickened skin on feet     2) common wart x 1  Treatment options include Cimetidine, Aldara, Efudex, OTC topicals, wart peel, metaplast tape, candida injection, Bleomycin, cantharidin topical, cryo therapy, excision, and electrocautery. Disc multiple treatments usually needed.   LN2: Treated with LN2 for 5s for 1-2 cycles. Warned risks of blistering, pain, pigment change, scarring, and incomplete resolution.  Advised patient to return if lesions do not completely resolve within 2-3 months.  Wound care sheet given.        3) Seborrheic Dermatitis, localized pruritis  Chronic condition that cannot be cured, but it can be managed.   When flared:   Scalp:   Wash scalp 2-3x a week with Nizoral: Wet affected area daily, apply shampoo and lather, let sit for 3-5 minutes and then rinse.   If multiple shampoos discussed begin  two prescription shampoos or one prescription shampoo and one over the counter shampoo  (examples: Head and shoulders, Selsen Blue, Ketoconazole, T-Sal, and/or T-gel.      Lidex: Apply a thin layer to affected area on scalp 2x a day x 4 weeks, tapering with improvement. Do not apply to face or body folds.       Face:   Wash affected area on beard and ears daily in the shower with nizoral shampoo  Apply betamethasone lotion to aa on ears and beard bid x 2-4 weeks.      Side effects of topical steroids including but not limited to atrophy (skin thinning), striae (stretch marks) telangiectasias, steroid acne, and others. Do not apply to normal skin. Do not apply to discolored skin that does not have rash present.      4) tinea cruris, localized pruritis  Wash affected area( groin) in the shower a few times a week. Wet affected area daily, apply shampoo and lather, let sit for 3-5 minutes and then rinse.   When flared apply ketoconazole 2x a day for up to 4-6 weeks when flared  consider over the counter zeazorb AF powder to help keep area dry     5) onychomycosis ( nail fungus)  Begin ciclopirox  Discussed treatment options with OTC products including daily application of tea tree oil, Vicks vapor rub, and/or apple cider vinegar soaks. Disc oral agents and liver function tests required at baseline, 6 weeks after treatment begins, and then once treatment ends. Prescription strength topicals are available, can use up to 48 weeks. Fingernails take 3-6 months to regrow completely, and toenails up to 12-18 months.     WOUND CARE INSTRUCTIONS  FOR CRYOSURGERY  For questions please call [phone]        This area treated with liquid nitrogen will form a blister. You do not need to bandage the area until after the blister forms and breaks (which may be a few days).  When the blister breaks, begin daily dressing changes as follows:    1) Clean and dry the area with tap water using clean Q-tip or sterile gauze pad.    2) Apply Vaseline or Aquaphor over entire wound. Other options include Polysporin ointment or Bacitracin ointment over entire wound.  Do NOT use Neosporin ointment.    3) Cover the wound with a band-aid or sterile non-stick gauze pad and micropore paper tape.      REPEAT THESE INSTRUCTIONS AT LEAST ONCE A DAY UNTIL  THE WOUND HAS COMPLETELY HEALED.        It is an old wives tale that a wound heals better when it is exposed to air and allowed to dry out. The wound will heal faster with a better cosmetic result if it is kept moist with ointment and covered with a bandage.  Do not let the wound dry out.      Supplies Needed:     *Cotton tipped applicators (Q-tips)   *Polysporin ointment or Bacitracin ointment (NOT NEOSPORIN)   *Band-aids, or non stick gauze pads and micropore paper tape    PATIENT INFORMATION    During the healing process you will notice a number of changes. All wounds develop a small halo of redness surrounding the wound.  This means healing is occurring. Severe itching with extensive redness usually indicates sensitivity to the ointment or bandage tape used to dress the wound.  You should call our office if this develops.      Swelling and/or discoloration around your surgical site is common, particularly when performed around the eye.    All wounds normally drain.  The larger the wound the more drainage there will be.  After 7-10 days, you will notice the wound beginning to shrink and new skin will begin to grow.  The wound is healed when you can see skin has formed over the entire area.  A healed wound has a healthy, shiny look to the surface and is red to dark pink in color to normalize.  Wounds may take approximately 4-6 weeks to heal.  Larger wounds may take 6-8 weeks.  After the wound is healed you may discontinue dressing changes.    You may experience a sensation of tightness as your wound heals. This is normal and will gradually subside.    Your healed wound may be sensitive to temperature changes. This sensitivity improves with time, but if you re having a lot of discomfort, try to avoid temperature extremes.    Patients frequently experience itching after their wound appears to have healed because of the continue healing under the skin.  Plain Vaseline will help relieve the  itching.         WARTS  Warts are caused by the Human Papilloma Virus.They are commonly spread by direct contact or autoinoculation. That mean if the wart is picked at it could spread to another area of skin.   In children without treatment 50% of warts will disappear spontaneous in 6 months, 90% are gone in 2 years. In adults they can last for a longer period of time, but they can resolve without treatment.   No method of treatment is better than the other. You just have to be consistent with your treatments and return every 4-6 weeks.   In between treatments you should use either salicylic acid or mediplast tape:    Mediplast tape: Cut to size of wart, leave tape on for 1 week, (Put duct tape over it to secure it). In 1 week, pare skin down with a pumice stone and repeat. You want to pare down until you see some pinpoint bleeding. Do this for 6-8 weeks, if no improvement, make follow up appt.     Wart stick can be purchased online. (Twice a day, warts turn white, then pare down with a pumice stone and repeat) Do this for 6-8 weeks, if no improvement, make a follow up appt.       WART TREATMENTS    Wart(s), or verruca vulgaris  are a very common, benign skin condition caused by a virus.  Since warts are caused by a viral infection, your own immune system will typically clear the lesion on average from several months to 2 years. Although wart(s) do not easily spread to others, the virus may still pass through direct contact (picking or scratching) and/or indirect contact (locker rooms/public showers).     It is important to remember that there is no cure for the wart virus. This means that warts can return at the same site or appear in a new spot.    Sometimes, it seems that new warts appear as fast as old ones go away. This happens when the old warts shed virus cells into the skin before the warts are treated. This allows new warts to grow around the first warts. The best way to prevent this is to have your  dermatologist treat new warts as soon as they appear.    There are several technique/methods to making the wart(s) smaller in size or to help stimulate your immune system to clear the wart(s). The mainstay of treatment of wart(s) is to destroy the infected skin cells from the virus and to prevent recurrence.  The most important thing to remember is that regular consistent treatment is the most effective way to get rid of the wart.    Salicylic acid & Debridement   The first line treatment of warts is application of salicylic acid (with or without duct tape occlusion). Application of salicylic acid allows the wart(s) to stay flat and not callused. This allows other topical treatments to work better.      We recommend Wart Stick or Mediplast Tape over-the-counter   Directions: Apply the Wart Stick to the site twice daily or apply a piece of the Mediplast tape to the wart and cover tightly with tape to occlude the lesion.   Keep the medication on for 24 hours before removing/changing. Do this foe one week continuously.      After one week, when the salicylic acid is removed from the affected area the wart(s) area should turn the skin white/softened. Use an emery board/paring tool to rub off the softened tissue before changing a new salicylic acid application. Make sure you dispose the emery board and do NOT reuse on another site.     Cryotherapy or Freezing   Freezing wart(s) with a very cold substance (liquid nitrogen) is an effective treatment for common warts. The wart(s) are frozen off to kill the viral activity in and around the affected area. The treated areas will become sore and/or red for the next few hours. Some adverse reactions of this treatment include: pain, blisters, blood blisters, infection, and/or lightening or darkening of the skin.   At times, when the wart is too thick and/or callused, the clinician will shave/pare down the thickened skin prior to spraying the wart(s) with liquid nitrogen.      Aldara  Cream   Aldara  Cream is a topical medication that activates your own immune system to help attack the cells infected with the virus. The cream works for resistant or recurrent warts that do not respond to freezing and/or salicylic acid.   Directions: Open the medication packet & apply to the affected area. Cover the lesion with a band-aid. The next morning, wash off the area with soap & water.  If the cream is going to work the wart(s) should get red and irritated.      Cantharidin (Canthacur/Cantharone)   Cantharidin is a chemical compound derived from a blister beetle. This liquid medication is applied to wart(s) in order to cause blistering, thus destroying the affected areas. At your  visit, application of cantharidin to the wart(s) is usually painless and the applied areas dry clear. Three to four hours after cantharidin is applied to the warts, you must wash off the area(s) with soap and water. Adverse reactions such as redness, tenderness, blistering, itching and burning sensations may occur within 2-3 days. It is expected to take 2-4 weeks for the treated areas to heal. Please follow up your provider in 6 weeks to reassess the treated areas.     Electrosurgery and curettage  Electrosurgery (burning) and Curettage (scraping off) of the wart(s) are often are used together. The dermatologist may remove the wart by scraping it off before or after electrosurgery. Some adverse reactions of this treatment include: pain, scarring, infection, and/or lightening or darkening of the skin.    Excision  Cutting out the wart is another option for wart treatment.  Some adverse reactions of this treatment include: pain, scarring, infection, and/or lightening or darkening of the skin.    TREATMENT RESISTANT WARTS    If the warts are hard-to-treat, we may use one of the following treatments:     Laser treatment  Laser treatment is an option, mainly for warts that have not responded to other therapies. Some adverse  "reactions of this treatment include: pain, scarring, infection, and/or lightening or darkening of the skin.    Chemical peels  When flat warts appear, there are usually many warts. Because so many warts appear, dermatologists often prescribe \"peeling\" methods to treat these warts. This means, either the doctor or you will apply a peeling medicine every couple weeks or at home every day. Peeling medicines include salicylic acid (stronger than you can buy at the store), tretinoin, and glycolic acid.Some adverse reactions of this treatment include: pain, scarring, infection, and/or lightening or darkening of the skin.    Bleomycin  The wart may be injected with an anti-cancer medicine, bleomycin. The shots may hurt. Some adverse reactions of this treatment include: pain, scarring, infection, and/or lightening or darkening of the skin, or nail loss if given in the fingers.    Immunotherapy  This treatment uses the patient s own immune system to fight the warts. This treatment is used when the warts remain despite other treatments. There are two types:     i) one type of immunotherapy involves applying a chemical, such as diphencyprone (DCP), to the warts. A mild allergic reaction occurs around the treated warts. This reaction may cause the warts to go away.     ii) Another type of immunotherapy involves getting shots of Syed antigen.  The shots can boost       "

## 2022-01-10 NOTE — LETTER
1/10/2022         RE: Jordan Miller  [address] [place] [address]        Dear Colleague,    Thank you for referring your patient, Jordan Miller, to the [facility]. Please see a copy of my visit note below.    HPI:  Jordan Miller is a 66 year old male patient here today for follow up seb derm on scalp, ears and beard. Using nizoral, lidex and fluocinlone with great control. Also here for follow up tinea cruris using nizoral shampoo and topical with clearance. He is also treating tinea pedis and calluses with urea and ketoconazole topical. Tinea pedis resolved, calluses improved. LOV wart on right  .  Patient states this has been present for a while.  Patient reports the following symptoms: bothersome .  Patient reports the following previous treatments: none.  Patient reports the following modifying factors: none.  Associated symptoms: none.  Patient has no other skin complaints today.  Remainder of the HPI, Meds, PMH, Allergies, FH, and SH was reviewed in chart.      Past Medical History:   Diagnosis Date     BPH (benign prostatic hyperplasia)      Hyperlipidemia      Hypertension      Left ankle pain      Type 2 diabetes mellitus (H)        Past Surgical History:   Procedure Laterality Date     CHOLECYSTECTOMY      1994     HERNIA REPAIR, UMBILICAL      ?2013        History reviewed. No pertinent family history.    Social History     Socioeconomic History     Marital status:      Spouse name: Not on file     Number of children: Not on file     Years of education: Not on file     Highest education level: Not on file   Occupational History     Not on file   Tobacco Use     Smoking status: Never Smoker     Smokeless tobacco: Never Used   Substance and Sexual Activity     Alcohol use: Yes     Comment: 2-3 drinks a week hard liquor     Drug use: Never     Sexual activity: Not on file   Other Topics Concern     Not on file   Social History Narrative     Not on file      Social Determinants of Health     Financial Resource Strain: Not on file   Food Insecurity: Not on file   Transportation Needs: Not on file   Physical Activity: Not on file   Stress: Not on file   Social Connections: Not on file   Intimate Partner Violence: Not on file   Housing Stability: Not on file       Outpatient Encounter Medications as of 1/10/2022   Medication Sig Dispense Refill     betamethasone dipropionate (DIPROSONE) 0.05 % external lotion Apply topically daily To affected area on face for 5-7 days. Tapering with improvement and restarting with flares. 60 mL 0     fluocinonide (LIDEX) 0.05 % external solution Apply to affected area on scalp BID x 4-6 weeks, tapering with improvement. Do not apply to face or body folds. 60 mL 3     ketoconazole (NIZORAL) 2 % external cream Apply topically 2 times daily To left foot for 4-6 weeks. 60 g 3     ketoconazole (NIZORAL) 2 % external shampoo Wet affected area daily, apply shampoo and lather, let sit for 3-5 minutes and then rinse. 120 mL 11     urea (GORDONS UREA) 40 % external ointment Apply to yellow thick areas on feet 1-2x daily 30 g 3     acetaminophen (TYLENOL) 325 MG tablet 1-2 tablets every 8 hours as needed for pain or fever 90 tablet 0     Alcohol Swabs (ALCOHOL WIPES) 70 % PADS USE AS DIRECTED TO TEST BLOOD SUGARS 100 each 3     atenolol (TENORMIN) 25 MG tablet Take 1 tablet (25 mg) by mouth daily 90 tablet 2     blood glucose monitoring (ACCU-CHEK ANAMARIA PLUS) meter device kit Use to test blood sugar 1 times daily or as directed. 1 kit 0     cyanocobalamin (VITAMIN B-12) 1000 MCG tablet Take 1 tablet (1,000 mcg) by mouth every other day 90 tablet 2     glimepiride (AMARYL) 1 MG tablet Take 1 tablet (1 mg) by mouth every morning (before breakfast) 90 tablet 0     guaiFENesin (MUCINEX) 600 MG 12 hr tablet Take 2 tablets (1,200 mg) by mouth 2 times daily 28 tablet 0     lisinopril (ZESTRIL) 10 MG tablet Take 1 tablet (10 mg) by mouth daily 90 tablet  1     metFORMIN (GLUCOPHAGE) 1000 MG tablet Take 0.5 tablets (500 mg) by mouth 2 times daily (with meals) 180 tablet 1     Omega-3 Fatty Acids (FISH OIL BURP-LESS) 1000 MG CAPS Take 1 capsule by mouth 2 times daily 180 capsule 0     Omega-3 Fish Oil 500 MG capsule Take 1-2 capsules (500-1,000 mg) by mouth daily 180 capsule 1     rosuvastatin (CRESTOR) 10 MG tablet Take 1 tablet (10 mg) by mouth daily 90 tablet 3     tamsulosin (FLOMAX) 0.4 MG capsule Take 1 capsule (0.4 mg) by mouth 2 times daily 180 capsule 2     vitamin D3 (CHOLECALCIFEROL) 50 mcg (2000 units) tablet Take 2 tablets (100 mcg) by mouth daily 180 tablet 2     No facility-administered encounter medications on file as of 1/10/2022.       Review Of Systems:  Skin: rashes  Eyes: negative  Ears/Nose/Throat: negative  Respiratory: No shortness of breath, dyspnea on exertion, cough, or hemoptysis  Cardiovascular: negative  Gastrointestinal: negative  Genitourinary: negative  Musculoskeletal: negative  Neurologic: negative  Psychiatric: negative  Hematologic/Lymphatic/Immunologic: negative  Endocrine: negative      Objective:     /65   Pulse 75   SpO2 97%   Eyes: Conjunctivae/lids: Normal   ENT: Lips:  Normal  MSK: Normal  Cardiovascular: Peripheral edema none  Pulm: Breathing Normal  Neuro/Psych: Orientation: A/O x 3. Normal; Mood/Affect: Normal, NAD, WDWN  Pt accompanied by: self  Following areas examined: face, neck, scalp, left ear, feet, right hand  Crane skin type:iv   Findings:  Flesh-colored verrucous appearing papule on right 3rd PIP  nml appearing scalp, ears, and beard  Yellow thickened skin on left plantar foot  Scalp, ears clear  Yellowing and thickening of great toenails, right 4th toenail      Assessment and Plan:     1) tinea pedis, callus  Tinea pedis resolved  Calluses improved.  When/if you flare begin ketoconazole 2x a day to feet  Continue urea cream 1-2x a day to thickened skin on feet for calluses      2) common wart x  1  Treatment options include Cimetidine, Aldara, Efudex, OTC topicals, wart peel, metaplast tape, candida injection, Bleomycin, cantharidin topical, cryo therapy, excision, and electrocautery. Disc multiple treatments usually needed.   LN2: Treated with LN2 for 5s for 1-2 cycles. Warned risks of blistering, pain, pigment change, scarring, and incomplete resolution.  Advised patient to return if lesions do not completely resolve within 2-3 months.  Wound care sheet given.        3) Seborrheic Dermatitis, localized pruritis  Great control  Chronic condition that cannot be cured, but it can be managed.   When flared:   Scalp:   Wash scalp 2-3x a week with Nizoral: Wet affected area daily, apply shampoo and lather, let sit for 3-5 minutes and then rinse.   If multiple shampoos discussed begin  two prescription shampoos or one prescription shampoo and one over the counter shampoo (examples: Head and shoulders, Selsen Blue, Ketoconazole, T-Sal, and/or T-gel.      Lidex: Apply a thin layer to affected area on scalp 2x a day x 4 weeks, tapering with improvement. Do not apply to face or body folds.      Face:   Wash affected area on beard and ears daily in the shower with nizoral shampoo  Apply betamethasone lotion to aa on ears and beard bid x 2-4 weeks.      Side effects of topical steroids including but not limited to atrophy (skin thinning), striae (stretch marks) telangiectasias, steroid acne, and others. Do not apply to normal skin. Do not apply to discolored skin that does not have rash present.      4) tinea cruris, localized pruritis  Pt defers examination-states resolved  Continue to use nizoral shampoo regularly  Wash affected area( groin) in the shower a few times a week. Wet affected area daily, apply shampoo and lather, let sit for 3-5 minutes and then rinse.   When flared apply ketoconazole 2x a day for up to 4-6 weeks when flared  consider over the counter zeazorb AF powder to help keep area dry   It was a  pleasure speaking with Jordan Miller today.     5) onychomycosis   Begin ciclopirox topical  Discussed treatment options with OTC products including daily application of tea tree oil, Vicks vapor rub, and/or apple cider vinegar soaks. Disc oral agents and liver function tests required at baseline, 6 weeks after treatment begins, and then once treatment ends. Prescription strength topicals are available, can use up to 48 weeks. Fingernails take 3-6 months to regrow completely, and toenails up to 12-18 months.       Follow up in yearly for refills. Sooner if flaring.     Reviewed patient chart from 10/27/21.     It was a pleasure speaking with Jordan Miller today.       Follow up in 4 weeks to retreat wart      Again, thank you for allowing me to participate in the care of your patient.        Sincerely,        Amna Julian PA-C

## 2022-01-10 NOTE — PROGRESS NOTES
HPI:  Jordan Miller is a 66 year old male patient here today for follow up seb derm on scalp, ears and beard. Using nizoral, lidex and fluocinlone with great control. Also here for follow up tinea cruris using nizoral shampoo and topical with clearance. He is also treating tinea pedis and calluses with urea and ketoconazole topical. Tinea pedis resolved, calluses improved. LOV wart on right  .  Patient states this has been present for a while.  Patient reports the following symptoms: bothersome .  Patient reports the following previous treatments: none.  Patient reports the following modifying factors: none.  Associated symptoms: none.  Patient has no other skin complaints today.  Remainder of the HPI, Meds, PMH, Allergies, FH, and SH was reviewed in chart.      Past Medical History:   Diagnosis Date     BPH (benign prostatic hyperplasia)      Hyperlipidemia      Hypertension      Left ankle pain      Type 2 diabetes mellitus (H)        Past Surgical History:   Procedure Laterality Date     CHOLECYSTECTOMY      1994     HERNIA REPAIR, UMBILICAL      ?2013        History reviewed. No pertinent family history.    Social History     Socioeconomic History     Marital status:      Spouse name: Not on file     Number of children: Not on file     Years of education: Not on file     Highest education level: Not on file   Occupational History     Not on file   Tobacco Use     Smoking status: Never Smoker     Smokeless tobacco: Never Used   Substance and Sexual Activity     Alcohol use: Yes     Comment: 2-3 drinks a week hard liquor     Drug use: Never     Sexual activity: Not on file   Other Topics Concern     Not on file   Social History Narrative     Not on file     Social Determinants of Health     Financial Resource Strain: Not on file   Food Insecurity: Not on file   Transportation Needs: Not on file   Physical Activity: Not on file   Stress: Not on file   Social Connections: Not on file   Intimate Partner  Violence: Not on file   Housing Stability: Not on file       Outpatient Encounter Medications as of 1/10/2022   Medication Sig Dispense Refill     betamethasone dipropionate (DIPROSONE) 0.05 % external lotion Apply topically daily To affected area on face for 5-7 days. Tapering with improvement and restarting with flares. 60 mL 0     fluocinonide (LIDEX) 0.05 % external solution Apply to affected area on scalp BID x 4-6 weeks, tapering with improvement. Do not apply to face or body folds. 60 mL 3     ketoconazole (NIZORAL) 2 % external cream Apply topically 2 times daily To left foot for 4-6 weeks. 60 g 3     ketoconazole (NIZORAL) 2 % external shampoo Wet affected area daily, apply shampoo and lather, let sit for 3-5 minutes and then rinse. 120 mL 11     urea (GORDONS UREA) 40 % external ointment Apply to yellow thick areas on feet 1-2x daily 30 g 3     acetaminophen (TYLENOL) 325 MG tablet 1-2 tablets every 8 hours as needed for pain or fever 90 tablet 0     Alcohol Swabs (ALCOHOL WIPES) 70 % PADS USE AS DIRECTED TO TEST BLOOD SUGARS 100 each 3     atenolol (TENORMIN) 25 MG tablet Take 1 tablet (25 mg) by mouth daily 90 tablet 2     blood glucose monitoring (ACCU-CHEK ANAMARIA PLUS) meter device kit Use to test blood sugar 1 times daily or as directed. 1 kit 0     cyanocobalamin (VITAMIN B-12) 1000 MCG tablet Take 1 tablet (1,000 mcg) by mouth every other day 90 tablet 2     glimepiride (AMARYL) 1 MG tablet Take 1 tablet (1 mg) by mouth every morning (before breakfast) 90 tablet 0     guaiFENesin (MUCINEX) 600 MG 12 hr tablet Take 2 tablets (1,200 mg) by mouth 2 times daily 28 tablet 0     lisinopril (ZESTRIL) 10 MG tablet Take 1 tablet (10 mg) by mouth daily 90 tablet 1     metFORMIN (GLUCOPHAGE) 1000 MG tablet Take 0.5 tablets (500 mg) by mouth 2 times daily (with meals) 180 tablet 1     Omega-3 Fatty Acids (FISH OIL BURP-LESS) 1000 MG CAPS Take 1 capsule by mouth 2 times daily 180 capsule 0     Omega-3 Fish Oil  500 MG capsule Take 1-2 capsules (500-1,000 mg) by mouth daily 180 capsule 1     rosuvastatin (CRESTOR) 10 MG tablet Take 1 tablet (10 mg) by mouth daily 90 tablet 3     tamsulosin (FLOMAX) 0.4 MG capsule Take 1 capsule (0.4 mg) by mouth 2 times daily 180 capsule 2     vitamin D3 (CHOLECALCIFEROL) 50 mcg (2000 units) tablet Take 2 tablets (100 mcg) by mouth daily 180 tablet 2     No facility-administered encounter medications on file as of 1/10/2022.       Review Of Systems:  Skin: rashes  Eyes: negative  Ears/Nose/Throat: negative  Respiratory: No shortness of breath, dyspnea on exertion, cough, or hemoptysis  Cardiovascular: negative  Gastrointestinal: negative  Genitourinary: negative  Musculoskeletal: negative  Neurologic: negative  Psychiatric: negative  Hematologic/Lymphatic/Immunologic: negative  Endocrine: negative      Objective:     /65   Pulse 75   SpO2 97%   Eyes: Conjunctivae/lids: Normal   ENT: Lips:  Normal  MSK: Normal  Cardiovascular: Peripheral edema none  Pulm: Breathing Normal  Neuro/Psych: Orientation: A/O x 3. Normal; Mood/Affect: Normal, NAD, WDWN  Pt accompanied by: self  Following areas examined: face, neck, scalp, left ear, feet, right hand  Crane skin type:iv   Findings:  Flesh-colored verrucous appearing papule on right 3rd PIP  nml appearing scalp, ears, and beard  Yellow thickened skin on left plantar foot  Scalp, ears clear  Yellowing and thickening of great toenails, right 4th toenail      Assessment and Plan:     1) tinea pedis, callus  Tinea pedis resolved  Calluses improved.  When/if you flare begin ketoconazole 2x a day to feet  Continue urea cream 1-2x a day to thickened skin on feet for calluses      2) common wart x 1  Treatment options include Cimetidine, Aldara, Efudex, OTC topicals, wart peel, metaplast tape, candida injection, Bleomycin, cantharidin topical, cryo therapy, excision, and electrocautery. Disc multiple treatments usually needed.   LN2: Treated  with LN2 for 5s for 1-2 cycles. Warned risks of blistering, pain, pigment change, scarring, and incomplete resolution.  Advised patient to return if lesions do not completely resolve within 2-3 months.  Wound care sheet given.        3) Seborrheic Dermatitis, localized pruritis  Great control  Chronic condition that cannot be cured, but it can be managed.   When flared:   Scalp:   Wash scalp 2-3x a week with Nizoral: Wet affected area daily, apply shampoo and lather, let sit for 3-5 minutes and then rinse.   If multiple shampoos discussed begin  two prescription shampoos or one prescription shampoo and one over the counter shampoo (examples: Head and shoulders, Selsen Blue, Ketoconazole, T-Sal, and/or T-gel.      Lidex: Apply a thin layer to affected area on scalp 2x a day x 4 weeks, tapering with improvement. Do not apply to face or body folds.      Face:   Wash affected area on beard and ears daily in the shower with nizoral shampoo  Apply betamethasone lotion to aa on ears and beard bid x 2-4 weeks.      Side effects of topical steroids including but not limited to atrophy (skin thinning), striae (stretch marks) telangiectasias, steroid acne, and others. Do not apply to normal skin. Do not apply to discolored skin that does not have rash present.      4) tinea cruris, localized pruritis  Pt defers examination-states resolved  Continue to use nizoral shampoo regularly  Wash affected area( groin) in the shower a few times a week. Wet affected area daily, apply shampoo and lather, let sit for 3-5 minutes and then rinse.   When flared apply ketoconazole 2x a day for up to 4-6 weeks when flared  consider over the counter zeazorb AF powder to help keep area dry   It was a pleasure speaking with Jordan Miller today.     5) onychomycosis   Begin ciclopirox topical  Discussed treatment options with OTC products including daily application of tea tree oil, Vicks vapor rub, and/or apple cider vinegar soaks. Disc oral agents  and liver function tests required at baseline, 6 weeks after treatment begins, and then once treatment ends. Prescription strength topicals are available, can use up to 48 weeks. Fingernails take 3-6 months to regrow completely, and toenails up to 12-18 months.       Follow up in yearly for refills. Sooner if flaring.     Reviewed patient chart from 10/27/21.     It was a pleasure speaking with Jordan Miller today.       Follow up in 4 weeks to retreat wart

## 2022-01-11 NOTE — TELEPHONE ENCOUNTER
Called and notified patient of providers message.    Sarah TRIANARN BSN  [facility]  [phone]

## 2022-01-12 ENCOUNTER — OFFICE VISIT (OUTPATIENT)
Dept: FAMILY MEDICINE | Facility: CLINIC | Age: 67
End: 2022-01-12
Payer: MEDICARE

## 2022-01-12 ENCOUNTER — TELEPHONE (OUTPATIENT)
Dept: DERMATOLOGY | Facility: CLINIC | Age: 67
End: 2022-01-12
Payer: COMMERCIAL

## 2022-01-12 VITALS
BODY MASS INDEX: 28.41 KG/M2 | DIASTOLIC BLOOD PRESSURE: 67 MMHG | HEIGHT: 67 IN | HEART RATE: 61 BPM | WEIGHT: 181 LBS | RESPIRATION RATE: 18 BRPM | OXYGEN SATURATION: 99 % | SYSTOLIC BLOOD PRESSURE: 119 MMHG | TEMPERATURE: 97.5 F

## 2022-01-12 DIAGNOSIS — E11.9 TYPE 2 DIABETES MELLITUS WITHOUT COMPLICATION, WITHOUT LONG-TERM CURRENT USE OF INSULIN (H): Primary | ICD-10-CM

## 2022-01-12 DIAGNOSIS — I10 HYPERTENSION, UNSPECIFIED TYPE: ICD-10-CM

## 2022-01-12 DIAGNOSIS — E78.5 DYSLIPIDEMIA: ICD-10-CM

## 2022-01-12 PROCEDURE — 99214 OFFICE O/P EST MOD 30 MIN: CPT | Performed by: INTERNAL MEDICINE

## 2022-01-12 ASSESSMENT — MIFFLIN-ST. JEOR: SCORE: 1564.64

## 2022-01-12 ASSESSMENT — PAIN SCALES - GENERAL: PAINLEVEL: NO PAIN (0)

## 2022-01-12 NOTE — PROGRESS NOTES
"  Assessment & Plan   Problem List Items Addressed This Visit        Endocrine    Type 2 diabetes mellitus without complication, without long-term current use of insulin (H) - Primary    Dyslipidemia       Circulatory    Hypertension, unspecified type           Reassured patient about his lab results his A1c was 6.6 increased from 6.4 done in different lab, discussed A1c and what affects the results.  Continue with lifestyle changes he goes to the gym for 5 times a week for 3 to 4 hours at each.  Blood pressure symptoms in the afternoon is in the 140s to 150s in the mornings more 1 17-1 20 low numbers in the mid 50s.  Advised to call us with blood pressure reading in the afternoon persistently elevated we consider adding amlodipine small dose.  Advised to increase his statin Crestor to 20 mg his LDL was 117 he declined goal is less than 70 given his diabetes.  We did reassure about his A1c level can increase glimepiride to 2 mg counseled on healthy diet low carbs low-fat low-cholesterol diet.  He is on Flomax 0.4 mg twice a day initially said once a day was not clear on the dose.         BMI:   Estimated body mass index is 28.35 kg/m  as calculated from the following:    Height as of this encounter: 1.702 m (5' 7\").    Weight as of this encounter: 82.1 kg (181 lb).   Weight management plan: Patient was referred to their PCP to discuss a diet and exercise plan.    Work on weight loss  Regular exercise  See Patient Instructions    Return in about 3 months (around 4/12/2022), or if symptoms worsen or fail to improve, for As needed and if symptoms worsen, other.    Kristi Wise MD  [facility] SANT[place] Ortega is a 66 year old who presents for the following health issues     HPI   Patient presenting for follow-up, he is concerned about his A1c level is 6.6.  Increased from 6.2.  He goes to the gym as his guide he is 66 he goes to the gym 5 times a day a week for 5 hours each day to the gym ; " ".  He reports blood pressure readings in the afternoon up to 140s 150s in the morning 1 17-1 20.  Is compliant with medications.  Denies any chest pain dyspnea shortness of breath.  No other systemic complaints his voiding stream is good he is on Flomax 0.4 mg twice a day initially said he was taking once a day.    Review of Systems   Constitutional, HEENT, cardiovascular, pulmonary, GI, , musculoskeletal, neuro, skin, endocrine and psych systems are negative, except as otherwise noted.      Objective    /67 (BP Location: Right arm, Patient Position: Chair, Cuff Size: Adult Regular)   Pulse 61   Temp 97.5  F (36.4  C) (Temporal)   Resp 18   Ht 1.702 m (5' 7\")   Wt 82.1 kg (181 lb)   SpO2 99%   BMI 28.35 kg/m    Body mass index is 28.35 kg/m .  Physical Exam   General appearance not in acute distress alert oriented x3  Head and neck no neck masses,  Lung clear to auscultation  Heart regular rate and rhythm  Abdomen soft  Extremities no edema      Lab on 01/04/2022   Component Date Value Ref Range Status     Cholesterol 01/04/2022 201* <=199 mg/dL Final     Triglycerides 01/04/2022 70  <=149 mg/dL Final     Direct Measure HDL 01/04/2022 71  >=40 mg/dL Final    HDL Cholesterol Reference Range:     0-2 years:   No reference ranges established for patients under 2 years old  [facility].    2-8 years:  Greater than 45 mg/dL     18 years and older:   Female: Greater than or equal to 50 mg/dL   Male:   Greater than or equal to 40 mg/dL     LDL Cholesterol Calculated 01/04/2022 116  <=129 mg/dL Final     Patient Fasting > 8hrs? 01/04/2022 Unknown   Final     Hemoglobin A1C 01/04/2022 6.6* 0.0 - 5.6 % Final    Normal <5.7%   Prediabetes 5.7-6.4%    Diabetes 6.5% or higher     Note: Adopted from ADA consensus guidelines.     Sodium 01/04/2022 138  136 - 145 mmol/L Final     Potassium 01/04/2022 4.3  3.5 - 5.0 mmol/L Final     Chloride 01/04/2022 106  98 - 107 mmol/L " Final     Carbon Dioxide (CO2) 01/04/2022 25  22 - 31 mmol/L Final     Anion Gap 01/04/2022 7  5 - 18 mmol/L Final     Urea Nitrogen 01/04/2022 13  8 - 22 mg/dL Final     Creatinine 01/04/2022 0.97  0.70 - 1.30 mg/dL Final     Calcium 01/04/2022 9.4  8.5 - 10.5 mg/dL Final     Glucose 01/04/2022 121  70 - 125 mg/dL Final     Alkaline Phosphatase 01/04/2022 55  45 - 120 U/L Final     AST 01/04/2022 18  0 - 40 U/L Final     ALT 01/04/2022 18  0 - 45 U/L Final     Protein Total 01/04/2022 6.8  6.0 - 8.0 g/dL Final     Albumin 01/04/2022 3.9  3.5 - 5.0 g/dL Final     Bilirubin Total 01/04/2022 0.6  0.0 - 1.0 mg/dL Final     GFR Estimate 01/04/2022 87  >60 mL/min/1.73m2 Final    Effective December 21, 2021 eGFRcr in adults is calculated using the 2021 CKD-EPI creatinine equation which includes age and gender (Christos et al., NEJ, DOI: 10.1056/BHTWjk7514044)     Microalbumin Urine mg/dL 01/04/2022 <0.50  0.00 - 1.99 mg/dL Final     Creatinine Urine mg/dL 01/04/2022 23  mg/dL Final     Microalbumin Urine mg/g Cr 01/04/2022    Final    Unable to calculate:  Urine creatinine or microalbumin value below detectable level     Hold Specimen 01/04/2022 [facility]   Final     Vitamin D, Total (25-Hydroxy) 01/04/2022 59  30 - 80 ug/L Final     Prostate Specific Antigen Screen 01/04/2022 1.18  0.00 - 4.50 ug/L Final     Vitamin B12 01/04/2022 766  213 - 816 pg/mL Final

## 2022-01-13 DIAGNOSIS — L21.9 SEBORRHEIC DERMATITIS: Primary | ICD-10-CM

## 2022-01-13 RX ORDER — BETAMETHASONE VALERATE 0.1 %
LOTION (ML) TOPICAL 2 TIMES DAILY
Qty: 60 ML | Refills: 1 | Status: SHIPPED | OUTPATIENT
Start: 2022-01-13 | End: 2022-12-13

## 2022-01-25 DIAGNOSIS — E11.9 TYPE 2 DIABETES MELLITUS WITHOUT COMPLICATION, WITHOUT LONG-TERM CURRENT USE OF INSULIN (H): Primary | ICD-10-CM

## 2022-01-25 RX ORDER — BLOOD-GLUCOSE CONTROL, NORMAL
EACH MISCELLANEOUS
OUTPATIENT
Start: 2022-01-25

## 2022-01-25 RX ORDER — BLOOD SUGAR DIAGNOSTIC
STRIP MISCELLANEOUS
Qty: 90 STRIP | Refills: 0 | Status: SHIPPED | OUTPATIENT
Start: 2022-01-25 | End: 2022-06-08

## 2022-01-25 RX ORDER — BLOOD SUGAR DIAGNOSTIC
STRIP MISCELLANEOUS
COMMUNITY
Start: 2021-12-20 | End: 2022-01-25

## 2022-01-25 RX ORDER — LANCETS
EACH MISCELLANEOUS
Qty: 90 EACH | Refills: 0 | Status: SHIPPED | OUTPATIENT
Start: 2022-01-25 | End: 2022-01-27

## 2022-01-25 NOTE — TELEPHONE ENCOUNTER
Called patient and advised he needs to go thru diabetic educator for supplies.  He was a little resistant saying 'they dont know much, I know more than they do.'  Advised again to reach out to Diabetic educators and gave him the number for follow up from his Diabetes Education referral.    Chiara Dunlap RN

## 2022-01-25 NOTE — TELEPHONE ENCOUNTER
Need to see diabetic educator, I am not familiar with Guide control solution     Not sure amounts he is using and what his insurance will cover.    I have referred him already to diabetic educator.

## 2022-01-25 NOTE — TELEPHONE ENCOUNTER
Mr Paul was seen was seen by you on 22 for diabetes check up.  His prescription for blood glucose testing supplies has .  Will you please place an order for these items?  1. Accu-chek Guide test strips  2. Accu-chek Softclix lancets  3. Accu-chek Guide control solution     We need new orders every 6 months for medicare compliance as well as an office visit note with a statement as to why he is testing at high utilization.  He is not insulin dependent so normal utilization use would be to test once daily.

## 2022-01-26 DIAGNOSIS — E11.9 TYPE 2 DIABETES MELLITUS WITHOUT COMPLICATION, WITHOUT LONG-TERM CURRENT USE OF INSULIN (H): ICD-10-CM

## 2022-01-27 ENCOUNTER — PATIENT OUTREACH (OUTPATIENT)
Dept: GERIATRIC MEDICINE | Facility: CLINIC | Age: 67
End: 2022-01-27
Payer: COMMERCIAL

## 2022-01-27 RX ORDER — LANCETS
EACH MISCELLANEOUS
Qty: 100 EACH | Refills: 0 | Status: SHIPPED | OUTPATIENT
Start: 2022-01-27 | End: 2022-06-21

## 2022-01-27 NOTE — PROGRESS NOTES
[facility] Care Coordination Contact    Care Coordinator spoke to Jordan and provided names of PCP's at [facility].      JD Srivastava  [facility]  Phone: [phone]

## 2022-02-21 ENCOUNTER — OFFICE VISIT (OUTPATIENT)
Dept: DERMATOLOGY | Facility: CLINIC | Age: 67
End: 2022-02-21
Payer: MEDICARE

## 2022-02-21 VITALS — HEART RATE: 68 BPM | SYSTOLIC BLOOD PRESSURE: 131 MMHG | DIASTOLIC BLOOD PRESSURE: 61 MMHG

## 2022-02-21 DIAGNOSIS — B07.8 COMMON WART: Primary | ICD-10-CM

## 2022-02-21 PROCEDURE — 17110 DESTRUCTION B9 LES UP TO 14: CPT | Performed by: PHYSICIAN ASSISTANT

## 2022-02-21 PROCEDURE — 99212 OFFICE O/P EST SF 10 MIN: CPT | Mod: 25 | Performed by: PHYSICIAN ASSISTANT

## 2022-02-21 NOTE — PROGRESS NOTES
HPI:  Jordan Miller is a 67 year old male patient here today for recheck wart on right hand .  Patient states this has been present for a while.  Patient reports the following symptoms: smaller .  Patient reports the following previous treatments: ln2 with some improvement.  Patient reports the following modifying factors: none.  Associated symptoms: none. Also here for onychomycosis of toenails treating with ciclopirox.  Patient has no other skin complaints today.  Remainder of the HPI, Meds, PMH, Allergies, FH, and SH was reviewed in chart.    Past Medical History:   Diagnosis Date     BPH (benign prostatic hyperplasia)      Hyperlipidemia      Hypertension      Left ankle pain      Type 2 diabetes mellitus (H)        Past Surgical History:   Procedure Laterality Date     CHOLECYSTECTOMY      1994     HERNIA REPAIR, UMBILICAL      ?2013        No family history on file.    Social History     Socioeconomic History     Marital status:      Spouse name: Not on file     Number of children: Not on file     Years of education: Not on file     Highest education level: Not on file   Occupational History     Not on file   Tobacco Use     Smoking status: Never Smoker     Smokeless tobacco: Never Used   Substance and Sexual Activity     Alcohol use: Yes     Comment: 2-3 drinks a week hard liquor     Drug use: Never     Sexual activity: Not on file   Other Topics Concern     Not on file   Social History Narrative     Not on file     Social Determinants of Health     Financial Resource Strain: Not on file   Food Insecurity: Not on file   Transportation Needs: Not on file   Physical Activity: Not on file   Stress: Not on file   Social Connections: Not on file   Intimate Partner Violence: Not on file   Housing Stability: Not on file       Outpatient Encounter Medications as of 2/21/2022   Medication Sig Dispense Refill     ACCU-CHEK GUIDE test strip Once daily 90 strip 0     acetaminophen (TYLENOL) 325 MG tablet 1-2 tablets  every 8 hours as needed for pain or fever 90 tablet 0     Alcohol Swabs (ALCOHOL WIPES) 70 % PADS USE AS DIRECTED TO TEST BLOOD SUGARS 100 each 3     atenolol (TENORMIN) 25 MG tablet Take 1 tablet (25 mg) by mouth daily 90 tablet 2     betamethasone valerate (VALISONE) 0.1 % external lotion Apply topically 2 times daily To face when flared. 60 mL 1     blood glucose monitoring (ACCU-CHEK ANAMARIA PLUS) meter device kit Use to test blood sugar 1 times daily or as directed. 1 kit 0     blood glucose monitoring (SOFTCLIX) lancets Use to test blood sugar 1 times daily or as directed. 100 each 0     ciclopirox (PENLAC) 8 % external solution Apply to adjacent skin and affected nails daily.  Remove with alcohol every 7 days, then repeat. Apply for 48 weeks. 6.6 mL 4     cyanocobalamin (VITAMIN B-12) 1000 MCG tablet Take 1 tablet (1,000 mcg) by mouth every other day 90 tablet 2     fluocinonide (LIDEX) 0.05 % external solution Apply to affected area on scalp BID x 4-6 weeks, tapering with improvement. Do not apply to face or body folds. 60 mL 3     glimepiride (AMARYL) 1 MG tablet Take 1 tablet (1 mg) by mouth every morning (before breakfast) 90 tablet 0     guaiFENesin (MUCINEX) 600 MG 12 hr tablet Take 2 tablets (1,200 mg) by mouth 2 times daily 28 tablet 0     ketoconazole (NIZORAL) 2 % external cream Apply topically 2 times daily To left foot for 4-6 weeks. 60 g 3     ketoconazole (NIZORAL) 2 % external shampoo Wet affected area daily, apply shampoo and lather, let sit for 3-5 minutes and then rinse. 120 mL 11     lisinopril (ZESTRIL) 10 MG tablet Take 1 tablet (10 mg) by mouth daily 90 tablet 1     metFORMIN (GLUCOPHAGE) 1000 MG tablet Take 0.5 tablets (500 mg) by mouth 2 times daily (with meals) 180 tablet 1     Omega-3 Fatty Acids (FISH OIL BURP-LESS) 1000 MG CAPS Take 1 capsule by mouth 2 times daily 180 capsule 0     Omega-3 Fish Oil 500 MG capsule Take 1-2 capsules (500-1,000 mg) by mouth daily 180 capsule 1      rosuvastatin (CRESTOR) 10 MG tablet Take 1 tablet (10 mg) by mouth daily 90 tablet 3     tamsulosin (FLOMAX) 0.4 MG capsule Take 1 capsule (0.4 mg) by mouth 2 times daily 180 capsule 2     urea (GORDONS UREA) 40 % external ointment Apply to yellow thick areas on feet 1-2x daily 30 g 3     vitamin D3 (CHOLECALCIFEROL) 50 mcg (2000 units) tablet Take 2 tablets (100 mcg) by mouth daily 180 tablet 2     No facility-administered encounter medications on file as of 2/21/2022.       Review Of Systems:  Skin: wart  Eyes: negative  Ears/Nose/Throat: negative  Respiratory: No shortness of breath, dyspnea on exertion, cough, or hemoptysis  Cardiovascular: negative  Gastrointestinal: negative  Genitourinary: negative  Musculoskeletal: negative  Neurologic: negative  Psychiatric: negative  Hematologic/Lymphatic/Immunologic: negative  Endocrine: negative      Objective:     /61   Pulse 68   Eyes: Conjunctivae/lids: Normal   ENT: Lips:  Normal  MSK: Normal  Cardiovascular: Peripheral edema none  Pulm: Breathing Normal  Neuro/Psych: Orientation: A/O x 3. Normal; Mood/Affect: Normal, NAD, WDWN  Pt accompanied by: self  Following areas examined: face, neck hands, feet. Wearing mask  Crane skin type:iv   Findings:  Flesh-colored verrucous appearing papule on right 3rd PIP  Yellowing and thickening of great toenails, right 4th toenail  Assessment and Plan:     1) onychomycosis   continue ciclopirox topical  Discussed treatment options with OTC products including daily application of tea tree oil, Vicks vapor rub, and/or apple cider vinegar soaks. Disc oral agents and liver function tests required at baseline, 6 weeks after treatment begins, and then once treatment ends. Prescription strength topicals are available, can use up to 48 weeks. Fingernails take 3-6 months to regrow completely, and toenails up to 12-18 months.     2) common wart x 1  Treatment options include Cimetidine, Aldara, Efudex, OTC topicals, wart peel,  metaplast tape, candida injection, Bleomycin, cantharidin topical, cryo therapy, excision, and electrocautery. Disc multiple treatments usually needed.   LN2: Treated with LN2 for 5s for 1-2 cycles. Warned risks of blistering, pain, pigment change, scarring, and incomplete resolution.  Advised patient to return if lesions do not completely resolve within 2-3 months.  Wound care sheet given.    Reviewed patient chart from 10/27/21 derm.      It was a pleasure speaking with Jordan Miller today.       Follow up in next available for wart tx

## 2022-02-21 NOTE — PATIENT INSTRUCTIONS
WARTS  Warts are caused by the Human Papilloma Virus.They are commonly spread by direct contact or autoinoculation. That mean if the wart is picked at it could spread to another area of skin.   In children without treatment 50% of warts will disappear spontaneous in 6 months, 90% are gone in 2 years. In adults they can last for a longer period of time, but they can resolve without treatment.   No method of treatment is better than the other. You just have to be consistent with your treatments and return every 4-6 weeks.   In between treatments you should use either salicylic acid or mediplast tape:    Mediplast tape: Cut to size of wart, leave tape on for 1 week, (Put duct tape over it to secure it). In 1 week, pare skin down with a pumice stone and repeat. You want to pare down until you see some pinpoint bleeding. Do this for 6-8 weeks, if no improvement, make follow up appt.     Wart stick can be purchased online. (Twice a day, warts turn white, then pare down with a pumice stone and repeat) Do this for 6-8 weeks, if no improvement, make a follow up appt.       WART TREATMENTS    Wart(s), or verruca vulgaris  are a very common, benign skin condition caused by a virus.  Since warts are caused by a viral infection, your own immune system will typically clear the lesion on average from several months to 2 years. Although wart(s) do not easily spread to others, the virus may still pass through direct contact (picking or scratching) and/or indirect contact (locker rooms/public showers).     It is important to remember that there is no cure for the wart virus. This means that warts can return at the same site or appear in a new spot.    Sometimes, it seems that new warts appear as fast as old ones go away. This happens when the old warts shed virus cells into the skin before the warts are treated. This allows new warts to grow around the first warts. The best way to prevent this is to have your dermatologist treat new  warts as soon as they appear.    There are several technique/methods to making the wart(s) smaller in size or to help stimulate your immune system to clear the wart(s). The mainstay of treatment of wart(s) is to destroy the infected skin cells from the virus and to prevent recurrence.  The most important thing to remember is that regular consistent treatment is the most effective way to get rid of the wart.    Salicylic acid & Debridement   The first line treatment of warts is application of salicylic acid (with or without duct tape occlusion). Application of salicylic acid allows the wart(s) to stay flat and not callused. This allows other topical treatments to work better.      We recommend Wart Stick or Mediplast Tape over-the-counter   Directions: Apply the Wart Stick to the site twice daily or apply a piece of the Mediplast tape to the wart and cover tightly with tape to occlude the lesion.   Keep the medication on for 24 hours before removing/changing. Do this foe one week continuously.      After one week, when the salicylic acid is removed from the affected area the wart(s) area should turn the skin white/softened. Use an emery board/paring tool to rub off the softened tissue before changing a new salicylic acid application. Make sure you dispose the emery board and do NOT reuse on another site.     Cryotherapy or Freezing   Freezing wart(s) with a very cold substance (liquid nitrogen) is an effective treatment for common warts. The wart(s) are frozen off to kill the viral activity in and around the affected area. The treated areas will become sore and/or red for the next few hours. Some adverse reactions of this treatment include: pain, blisters, blood blisters, infection, and/or lightening or darkening of the skin.   At times, when the wart is too thick and/or callused, the clinician will shave/pare down the thickened skin prior to spraying the wart(s) with liquid nitrogen.     Aldara  Cream   Aldara   Cream is a topical medication that activates your own immune system to help attack the cells infected with the virus. The cream works for resistant or recurrent warts that do not respond to freezing and/or salicylic acid.   Directions: Open the medication packet & apply to the affected area. Cover the lesion with a band-aid. The next morning, wash off the area with soap & water.  If the cream is going to work the wart(s) should get red and irritated.      Cantharidin (Canthacur/Cantharone)   Cantharidin is a chemical compound derived from a blister beetle. This liquid medication is applied to wart(s) in order to cause blistering, thus destroying the affected areas. At your  visit, application of cantharidin to the wart(s) is usually painless and the applied areas dry clear. Three to four hours after cantharidin is applied to the warts, you must wash off the area(s) with soap and water. Adverse reactions such as redness, tenderness, blistering, itching and burning sensations may occur within 2-3 days. It is expected to take 2-4 weeks for the treated areas to heal. Please follow up your provider in 6 weeks to reassess the treated areas.     Electrosurgery and curettage  Electrosurgery (burning) and Curettage (scraping off) of the wart(s) are often are used together. The dermatologist may remove the wart by scraping it off before or after electrosurgery. Some adverse reactions of this treatment include: pain, scarring, infection, and/or lightening or darkening of the skin.    Excision  Cutting out the wart is another option for wart treatment.  Some adverse reactions of this treatment include: pain, scarring, infection, and/or lightening or darkening of the skin.    TREATMENT RESISTANT WARTS    If the warts are hard-to-treat, we may use one of the following treatments:     Laser treatment  Laser treatment is an option, mainly for warts that have not responded to other therapies. Some adverse reactions of this treatment  "include: pain, scarring, infection, and/or lightening or darkening of the skin.    Chemical peels  When flat warts appear, there are usually many warts. Because so many warts appear, dermatologists often prescribe \"peeling\" methods to treat these warts. This means, either the doctor or you will apply a peeling medicine every couple weeks or at home every day. Peeling medicines include salicylic acid (stronger than you can buy at the store), tretinoin, and glycolic acid.Some adverse reactions of this treatment include: pain, scarring, infection, and/or lightening or darkening of the skin.    Bleomycin  The wart may be injected with an anti-cancer medicine, bleomycin. The shots may hurt. Some adverse reactions of this treatment include: pain, scarring, infection, and/or lightening or darkening of the skin, or nail loss if given in the fingers.    Immunotherapy  This treatment uses the patient s own immune system to fight the warts. This treatment is used when the warts remain despite other treatments. There are two types:     i) one type of immunotherapy involves applying a chemical, such as diphencyprone (DCP), to the warts. A mild allergic reaction occurs around the treated warts. This reaction may cause the warts to go away.     ii) Another type of immunotherapy involves getting shots of Syed antigen.  The shots can boost     "

## 2022-02-21 NOTE — LETTER
2/21/2022         RE: Jordan Miller  [address] [place] [address]        Dear Colleague,    Thank you for referring your patient, Jordan Miller, to the [facility]. Please see a copy of my visit note below.    HPI:  Jordan Miller is a 67 year old male patient here today for recheck wart on right hand .  Patient states this has been present for a while.  Patient reports the following symptoms: smaller .  Patient reports the following previous treatments: ln2 with some improvement.  Patient reports the following modifying factors: none.  Associated symptoms: none. Also here for onychomycosis of toenails treating with ciclopirox.  Patient has no other skin complaints today.  Remainder of the HPI, Meds, PMH, Allergies, FH, and SH was reviewed in chart.    Past Medical History:   Diagnosis Date     BPH (benign prostatic hyperplasia)      Hyperlipidemia      Hypertension      Left ankle pain      Type 2 diabetes mellitus (H)        Past Surgical History:   Procedure Laterality Date     CHOLECYSTECTOMY      1994     HERNIA REPAIR, UMBILICAL      ?2013        No family history on file.    Social History     Socioeconomic History     Marital status:      Spouse name: Not on file     Number of children: Not on file     Years of education: Not on file     Highest education level: Not on file   Occupational History     Not on file   Tobacco Use     Smoking status: Never Smoker     Smokeless tobacco: Never Used   Substance and Sexual Activity     Alcohol use: Yes     Comment: 2-3 drinks a week hard liquor     Drug use: Never     Sexual activity: Not on file   Other Topics Concern     Not on file   Social History Narrative     Not on file     Social Determinants of Health     Financial Resource Strain: Not on file   Food Insecurity: Not on file   Transportation Needs: Not on file   Physical Activity: Not on file   Stress: Not on file   Social Connections: Not on file   Intimate  Partner Violence: Not on file   Housing Stability: Not on file       Outpatient Encounter Medications as of 2/21/2022   Medication Sig Dispense Refill     ACCU-CHEK GUIDE test strip Once daily 90 strip 0     acetaminophen (TYLENOL) 325 MG tablet 1-2 tablets every 8 hours as needed for pain or fever 90 tablet 0     Alcohol Swabs (ALCOHOL WIPES) 70 % PADS USE AS DIRECTED TO TEST BLOOD SUGARS 100 each 3     atenolol (TENORMIN) 25 MG tablet Take 1 tablet (25 mg) by mouth daily 90 tablet 2     betamethasone valerate (VALISONE) 0.1 % external lotion Apply topically 2 times daily To face when flared. 60 mL 1     blood glucose monitoring (ACCU-CHEK ANAMARIA PLUS) meter device kit Use to test blood sugar 1 times daily or as directed. 1 kit 0     blood glucose monitoring (SOFTCLIX) lancets Use to test blood sugar 1 times daily or as directed. 100 each 0     ciclopirox (PENLAC) 8 % external solution Apply to adjacent skin and affected nails daily.  Remove with alcohol every 7 days, then repeat. Apply for 48 weeks. 6.6 mL 4     cyanocobalamin (VITAMIN B-12) 1000 MCG tablet Take 1 tablet (1,000 mcg) by mouth every other day 90 tablet 2     fluocinonide (LIDEX) 0.05 % external solution Apply to affected area on scalp BID x 4-6 weeks, tapering with improvement. Do not apply to face or body folds. 60 mL 3     glimepiride (AMARYL) 1 MG tablet Take 1 tablet (1 mg) by mouth every morning (before breakfast) 90 tablet 0     guaiFENesin (MUCINEX) 600 MG 12 hr tablet Take 2 tablets (1,200 mg) by mouth 2 times daily 28 tablet 0     ketoconazole (NIZORAL) 2 % external cream Apply topically 2 times daily To left foot for 4-6 weeks. 60 g 3     ketoconazole (NIZORAL) 2 % external shampoo Wet affected area daily, apply shampoo and lather, let sit for 3-5 minutes and then rinse. 120 mL 11     lisinopril (ZESTRIL) 10 MG tablet Take 1 tablet (10 mg) by mouth daily 90 tablet 1     metFORMIN (GLUCOPHAGE) 1000 MG tablet Take 0.5 tablets (500 mg) by  mouth 2 times daily (with meals) 180 tablet 1     Omega-3 Fatty Acids (FISH OIL BURP-LESS) 1000 MG CAPS Take 1 capsule by mouth 2 times daily 180 capsule 0     Omega-3 Fish Oil 500 MG capsule Take 1-2 capsules (500-1,000 mg) by mouth daily 180 capsule 1     rosuvastatin (CRESTOR) 10 MG tablet Take 1 tablet (10 mg) by mouth daily 90 tablet 3     tamsulosin (FLOMAX) 0.4 MG capsule Take 1 capsule (0.4 mg) by mouth 2 times daily 180 capsule 2     urea (GORDONS UREA) 40 % external ointment Apply to yellow thick areas on feet 1-2x daily 30 g 3     vitamin D3 (CHOLECALCIFEROL) 50 mcg (2000 units) tablet Take 2 tablets (100 mcg) by mouth daily 180 tablet 2     No facility-administered encounter medications on file as of 2/21/2022.       Review Of Systems:  Skin: wart  Eyes: negative  Ears/Nose/Throat: negative  Respiratory: No shortness of breath, dyspnea on exertion, cough, or hemoptysis  Cardiovascular: negative  Gastrointestinal: negative  Genitourinary: negative  Musculoskeletal: negative  Neurologic: negative  Psychiatric: negative  Hematologic/Lymphatic/Immunologic: negative  Endocrine: negative      Objective:     /61   Pulse 68   Eyes: Conjunctivae/lids: Normal   ENT: Lips:  Normal  MSK: Normal  Cardiovascular: Peripheral edema none  Pulm: Breathing Normal  Neuro/Psych: Orientation: A/O x 3. Normal; Mood/Affect: Normal, NAD, WDWN  Pt accompanied by: self  Following areas examined: face, neck hands, feet. Wearing mask  Crane skin type:iv   Findings:  Flesh-colored verrucous appearing papule on right 3rd PIP  Yellowing and thickening of great toenails, right 4th toenail  Assessment and Plan:     1) onychomycosis   continue ciclopirox topical  Discussed treatment options with OTC products including daily application of tea tree oil, Vicks vapor rub, and/or apple cider vinegar soaks. Disc oral agents and liver function tests required at baseline, 6 weeks after treatment begins, and then once treatment ends.  Prescription strength topicals are available, can use up to 48 weeks. Fingernails take 3-6 months to regrow completely, and toenails up to 12-18 months.     2) common wart x 1  Treatment options include Cimetidine, Aldara, Efudex, OTC topicals, wart peel, metaplast tape, candida injection, Bleomycin, cantharidin topical, cryo therapy, excision, and electrocautery. Disc multiple treatments usually needed.   LN2: Treated with LN2 for 5s for 1-2 cycles. Warned risks of blistering, pain, pigment change, scarring, and incomplete resolution.  Advised patient to return if lesions do not completely resolve within 2-3 months.  Wound care sheet given.    Reviewed patient chart from 10/27/21 derm.      It was a pleasure speaking with Jordan Miller today.       Follow up in next available for wart tx        Again, thank you for allowing me to participate in the care of your patient.        Sincerely,        Amna Julian PA-C

## 2022-03-15 ENCOUNTER — PATIENT OUTREACH (OUTPATIENT)
Dept: GERIATRIC MEDICINE | Facility: CLINIC | Age: 67
End: 2022-03-15
Payer: COMMERCIAL

## 2022-03-15 NOTE — PROGRESS NOTES
[facility] Care Coordination Contact    Care Coordinator left message for Jordan to call back regarding health plan product change from [facility] to [facility].     JD Srivastava  [facility]  Phone: [phone]

## 2022-03-18 ENCOUNTER — PATIENT OUTREACH (OUTPATIENT)
Dept: GERIATRIC MEDICINE | Facility: CLINIC | Age: 67
End: 2022-03-18
Payer: COMMERCIAL

## 2022-03-18 NOTE — PROGRESS NOTES
[facility] Care Coordination Contact    Care Coordinator left a message for Jordan asking for a call back regarding his switch from [facility] to [facility].     JD Srivastava  [facility]  Phone: [phone]

## 2022-03-18 NOTE — LETTER
March 24, 2022      JORDAN LOZANO  [address] [place] [address]        Dear Jordan:     Your Care Coordinator has been unable to reach you by telephone. I am writing to ask you or an authorized representative to call me at [phone]. If you reach my voicemail, please leave a message with your daytime telephone number and a date and time that I can call you. If you are hearing impaired, please call the [place] Relay at [phone] or [phone] ([email or web address] relay service).     The reason I am trying to reach you is:    [] Six (6) month check-in  [] To schedule your annual assessment  [x] Other: Product change [facility]    Please call me as soon as you receive this letter. I look forward to speaking with you.    Sincerely,    JD Srivastava  [phone]  [email or web address]) is a health plan that contracts with both Medicare and the [place] Medical Assistance (Medicaid) program to provide benefits of both programs to enrollees. Enrollment in [facility] depends on contract renewal.    [id number] [employer] APPROVED ([id number])  [id number] (02/19)

## 2022-03-23 ENCOUNTER — PATIENT OUTREACH (OUTPATIENT)
Dept: GERIATRIC MEDICINE | Facility: CLINIC | Age: 67
End: 2022-03-23
Payer: COMMERCIAL

## 2022-03-24 ENCOUNTER — PATIENT OUTREACH (OUTPATIENT)
Dept: GERIATRIC MEDICINE | Facility: CLINIC | Age: 67
End: 2022-03-24
Payer: COMMERCIAL

## 2022-03-24 NOTE — TELEPHONE ENCOUNTER
"[facility] Care Coordination Contact    Per CC, mailed client an \"Unable to Contact\" letter.    Maria C Germain  Care Management Specialist  [facility]  [phone]      "

## 2022-03-24 NOTE — PROGRESS NOTES
[facility] Care Coordination Contact    Completed 4 attempts to reach client with no response.  Member is officially unable to contact effective today.  Completed MMIS entry.  Completed health plan required [facility] POC.    Follow-up Plan: CC will attempt to reach member in six months.    JD Srivastava  [facility]  Phone: [phone]        [facility] Health Plan or Product Change    Care Coordinator left 4 messages with Jordan asking for a call back to discuss switch from [employer] [facility] to [facility] [facility].       Care Coordinator contacted PCP, [employer], and Financial Worker and confirmed no change in addr or phone.    JD Srivastava  [facility]  Phone: [phone]

## 2022-03-24 NOTE — PROGRESS NOTES
[facility] Care Coordination Contact    Care Coordinator left message for Jordan asking for a call back confirming he switched from [facility] to [facility].      JD Srivastava  [facility]  Phone: [phone]

## 2022-05-12 ENCOUNTER — OFFICE VISIT (OUTPATIENT)
Dept: FAMILY MEDICINE | Facility: CLINIC | Age: 67
End: 2022-05-12
Payer: COMMERCIAL

## 2022-05-12 VITALS
HEIGHT: 67 IN | HEART RATE: 92 BPM | OXYGEN SATURATION: 99 % | BODY MASS INDEX: 28.92 KG/M2 | DIASTOLIC BLOOD PRESSURE: 52 MMHG | WEIGHT: 184.25 LBS | SYSTOLIC BLOOD PRESSURE: 118 MMHG

## 2022-05-12 DIAGNOSIS — Z12.5 SCREENING FOR PROSTATE CANCER: ICD-10-CM

## 2022-05-12 DIAGNOSIS — E78.5 DYSLIPIDEMIA: ICD-10-CM

## 2022-05-12 DIAGNOSIS — Z13.6 SCREENING FOR AAA (ABDOMINAL AORTIC ANEURYSM): ICD-10-CM

## 2022-05-12 DIAGNOSIS — I10 HYPERTENSION, UNSPECIFIED TYPE: ICD-10-CM

## 2022-05-12 DIAGNOSIS — L72.9 SKIN CYST: ICD-10-CM

## 2022-05-12 DIAGNOSIS — Z12.11 SCREEN FOR COLON CANCER: ICD-10-CM

## 2022-05-12 DIAGNOSIS — R41.89 COGNITIVE IMPAIRMENT: ICD-10-CM

## 2022-05-12 DIAGNOSIS — N40.1 BENIGN PROSTATIC HYPERPLASIA WITH URINARY OBSTRUCTION: ICD-10-CM

## 2022-05-12 DIAGNOSIS — H25.013 CORTICAL AGE-RELATED CATARACT OF BOTH EYES: ICD-10-CM

## 2022-05-12 DIAGNOSIS — E55.9 VITAMIN D DEFICIENCY: ICD-10-CM

## 2022-05-12 DIAGNOSIS — E11.9 TYPE 2 DIABETES MELLITUS WITHOUT COMPLICATION, WITHOUT LONG-TERM CURRENT USE OF INSULIN (H): ICD-10-CM

## 2022-05-12 DIAGNOSIS — Z00.00 WELCOME TO MEDICARE PREVENTIVE VISIT: Primary | ICD-10-CM

## 2022-05-12 DIAGNOSIS — N13.8 BENIGN PROSTATIC HYPERPLASIA WITH URINARY OBSTRUCTION: ICD-10-CM

## 2022-05-12 PROBLEM — R07.89 ATYPICAL CHEST PAIN: Status: RESOLVED | Noted: 2019-07-04 | Resolved: 2022-05-12

## 2022-05-12 PROCEDURE — G0009 ADMIN PNEUMOCOCCAL VACCINE: HCPCS | Performed by: FAMILY MEDICINE

## 2022-05-12 PROCEDURE — 99214 OFFICE O/P EST MOD 30 MIN: CPT | Mod: 25 | Performed by: FAMILY MEDICINE

## 2022-05-12 PROCEDURE — 99397 PER PM REEVAL EST PAT 65+ YR: CPT | Mod: 25 | Performed by: FAMILY MEDICINE

## 2022-05-12 PROCEDURE — 90670 PCV13 VACCINE IM: CPT | Performed by: FAMILY MEDICINE

## 2022-05-12 RX ORDER — LISINOPRIL 10 MG/1
TABLET ORAL EVERY 24 HOURS
COMMUNITY
Start: 2021-06-01 | End: 2022-06-08

## 2022-05-12 RX ORDER — CYCLOSPORINE 0.5 MG/ML
1 EMULSION OPHTHALMIC PRN
COMMUNITY
Start: 2021-12-20

## 2022-05-12 ASSESSMENT — PATIENT HEALTH QUESTIONNAIRE - PHQ9
10. IF YOU CHECKED OFF ANY PROBLEMS, HOW DIFFICULT HAVE THESE PROBLEMS MADE IT FOR YOU TO DO YOUR WORK, TAKE CARE OF THINGS AT HOME, OR GET ALONG WITH OTHER PEOPLE: NOT DIFFICULT AT ALL
SUM OF ALL RESPONSES TO PHQ QUESTIONS 1-9: 0
SUM OF ALL RESPONSES TO PHQ QUESTIONS 1-9: 0

## 2022-05-12 ASSESSMENT — ACTIVITIES OF DAILY LIVING (ADL): CURRENT_FUNCTION: NO ASSISTANCE NEEDED

## 2022-05-12 NOTE — PATIENT INSTRUCTIONS
Patient Education   Personalized Prevention Plan  You are due for the preventive services outlined below.  Your care team is available to assist you in scheduling these services.  If you have already completed any of these items, please share that information with your care team to update in your medical record.  Health Maintenance Due   Topic Date Due     Diabetic Foot Exam  Never done     ANNUAL REVIEW OF HM ORDERS  Never done     Discuss Advance Care Planning  Never done     Hepatitis C Screening  Never done     Diptheria Tetanus Pertussis (DTAP/TDAP/TD) Vaccine (1 - Tdap) Never done     Zoster (Shingles) Vaccine (1 of 2) Never done     Pneumococcal Vaccine (2 - PPSV23 or PCV20) 09/02/2020     AORTIC ANEURYSM SCREENING (SYSTEM ASSIGNED)  Never done     PHQ-2 (once per calendar year)  01/01/2022     A1C Lab  04/04/2022     FALL RISK ASSESSMENT  04/23/2022     COVID-19 Vaccine (4 - Booster for Moderna series) 04/23/2022     Colorectal Cancer Screening  04/26/2022     Eye Exam  05/20/2022

## 2022-05-12 NOTE — PROGRESS NOTES
"SUBJECTIVE:   Jordan Miller 67 year old  who presents for Preventive Visit.      Patient has been advised of split billing requirements and indicates understanding: No   Are you in the first 12 months of your Medicare coverage?  yes,  Visual Acuity:  Right Eye: 20/25   Left Eye: 20/25  Both Eyes: 20/25    Healthy Habits:     In general, how would you rate your overall health?  Fair    Frequency of exercise:  6-7 days/week    Duration of exercise:  Greater than 60 minutes    Do you usually eat at least 4 servings of fruit and vegetables a day, include whole grains    & fiber and avoid regularly eating high fat or \"junk\" foods?  Yes    Taking medications regularly:  Yes    Medication side effects:  None    Ability to successfully perform activities of daily living:  No assistance needed    Home Safety:  Throw rugs in the hallway    Hearing Impairment:  No hearing concerns    In the past 6 months, have you been bothered by leaking of urine? Yes    In general, how would you rate your overall mental or emotional health?  Fair      PHQ-2 Total Score: 3    Additional concerns today:  No        PROBLEMS TO ADD ON... Patient is new to my practice moved from [place].  Known history of diabetes, high blood pressure hyperlipidemia and BPH well managed with the medication quite physically active patient is ex  [employer].  [place]ived in [place] 1997.  Patient with a lot of time explaining his wife condition to me which include PTSD and want to make sure that she will be on my schedule some point in time and he like to do fasting labs with her instead of today.  Diabetes Follow-up      How often are you checking your blood sugar? Not at all    What concerns do you have today about your diabetes? None     Do you have any of these symptoms? (Select all that apply)  No numbness or tingling in feet.  No redness, sores or blisters on feet.  No complaints of excessive thirst.  No reports of blurry vision.  No significant changes to " weight.    Have you had a diabetic eye exam in the last 12 months? Yes-  Location: patient will let us know          Hyperlipidemia Follow-Up      Are you regularly taking any medication or supplement to lower your cholesterol?   Yes- crestor    Are you having muscle aches or other side effects that you think could be caused by your cholesterol lowering medication?  No    Hypertension Follow-up      Do you check your blood pressure regularly outside of the clinic? No     Are you following a low salt diet? Yes    Are your blood pressures ever more than 140 on the top number (systolic) OR more   than 90 on the bottom number (diastolic), for example 140/90? No    BP Readings from Last 2 Encounters:   05/12/22 118/52   02/21/22 131/61     Hemoglobin A1C POCT (%)   Date Value   06/11/2021 6.4 (H)   04/23/2021 6.5 (H)     Hemoglobin A1C (%)   Date Value   01/04/2022 6.6 (H)   08/31/2021 6.2 (H)     LDL Cholesterol Calculated (mg/dL)   Date Value   01/04/2022 116   08/31/2021 113   04/23/2021 106 (H)   07/03/2019 82         Do you feel safe in your environment? Yes    Have you ever done Advance Care Planning? (For example, a Health Directive, POLST, or a discussion with a medical provider or your loved ones about your wishes): No, advance care planning information given to patient to review.  Patient declined advance care planning discussion at this time.       Fall risk  Fallen 2 or more times in the past year?: No  Any fall with injury in the past year?: No    Cognitive Screening   1) Repeat 3 items (Leader, Season, Table)    2) Clock draw: ABNORMAL Unsure of clock language problem clock drawn again - normal  3) 3 item recall: Recalls 3 objects  Results: 3 items recalled: COGNITIVE IMPAIRMENT LESS LIKELY    Mini-CogTM Copyright S Santiago. Licensed by the author for use in [employer] [facility]; reprinted with permission (alon@.edu). All rights reserved.      Do you have sleep apnea, excessive snoring or daytime  drowsiness?: no    Reviewed and updated as needed this visit by clinical staff   Tobacco  Allergies  Meds  Problems  Med Hx  Surg Hx  Fam Hx            Reviewed and updated as needed this visit by Provider   Tobacco  Allergies  Meds  Problems  Med Hx  Surg Hx  Fam Hx           Social History     Tobacco Use     Smoking status: Never Smoker     Smokeless tobacco: Never Used   Substance Use Topics     Alcohol use: Yes     Comment: 2-3 drinks a week hard liquor     If you drink alcohol do you typically have >3 drinks per day or >7 drinks per week? No    Alcohol Use 5/12/2022   Prescreen: >3 drinks/day or >7 drinks/week? No   Prescreen: >3 drinks/day or >7 drinks/week? -       Eye exam with ophthalmology on this date: last  Yr         Current providers sharing in care for this patient include:   Patient Care Team:  Kristi Wise MD as PCP - General (Internal Medicine)  Kristi Wise MD as Assigned PCP  Johanna Palencia RD as Diabetes Educator (Dietitian, Registered)  Fely Brown as Lead Care Coordinator    The following health maintenance items are reviewed in Epic and correct as of today:  Health Maintenance Due   Topic Date Due     DIABETIC FOOT EXAM  Never done     DTAP/TDAP/TD IMMUNIZATION (1 - Tdap) Never done     ZOSTER IMMUNIZATION (1 of 2) Never done     A1C  04/04/2022     COVID-19 Vaccine (4 - Booster for Moderna series) 04/23/2022     COLORECTAL CANCER SCREENING  04/26/2022     EYE EXAM  05/20/2022     Lab work is in process  Labs reviewed in EPIC  BP Readings from Last 3 Encounters:   05/12/22 118/52   02/21/22 131/61   01/12/22 119/67    Wt Readings from Last 3 Encounters:   05/12/22 83.6 kg (184 lb 4 oz)   01/12/22 82.1 kg (181 lb)   09/02/21 78.5 kg (173 lb)                    Patient Active Problem List   Diagnosis     Chronic pain of left ankle     Left knee pain     Type 2 diabetes mellitus without complication, without long-term current use of insulin (H)      Hypertension, unspecified type     Benign prostatic hyperplasia with urinary obstruction     Dyslipidemia     Primary osteoarthritis of both knees     Achilles tendinosis of left lower extremity     Cognitive impairment     Vitamin D insufficiency     Bilateral cataracts     Skin cyst     Past Surgical History:   Procedure Laterality Date     CHOLECYSTECTOMY      1994     HERNIA REPAIR, UMBILICAL      ?2013       Social History     Tobacco Use     Smoking status: Never Smoker     Smokeless tobacco: Never Used   Substance Use Topics     Alcohol use: Yes     Comment: 2-3 drinks a week hard liquor     History reviewed. No pertinent family history.        Current Outpatient Medications   Medication Sig Dispense Refill     ACCU-CHEK GUIDE test strip Once daily 90 strip 0     Alcohol Swabs (ALCOHOL WIPES) 70 % PADS USE AS DIRECTED TO TEST BLOOD SUGARS 100 each 3     betamethasone valerate (VALISONE) 0.1 % external lotion Apply topically 2 times daily To face when flared. 60 mL 1     blood glucose (NO BRAND SPECIFIED) test strip 1 each by Other route       blood glucose monitoring (ACCU-CHEK ANAMARIA PLUS) meter device kit Use to test blood sugar 1 times daily or as directed. 1 kit 0     blood glucose monitoring (SOFTCLIX) lancets Use to test blood sugar 1 times daily or as directed. 100 each 0     cholecalciferol (VITAMIN D3) 125 mcg (5000 units) capsule        ciclopirox (PENLAC) 8 % external solution Apply to adjacent skin and affected nails daily.  Remove with alcohol every 7 days, then repeat. Apply for 48 weeks. 6.6 mL 4     cyanocobalamin (VITAMIN B-12) 1000 MCG tablet Take 1 tablet (1,000 mcg) by mouth every other day 90 tablet 2     fluocinonide (LIDEX) 0.05 % external solution Apply to affected area on scalp BID x 4-6 weeks, tapering with improvement. Do not apply to face or body folds. 60 mL 3     glimepiride (AMARYL) 1 MG tablet Take 1 tablet (1 mg) by mouth every morning (before breakfast) 90 tablet 0      "ketoconazole (NIZORAL) 2 % external cream Apply topically 2 times daily To left foot for 4-6 weeks. 60 g 3     ketoconazole (NIZORAL) 2 % external shampoo Wet affected area daily, apply shampoo and lather, let sit for 3-5 minutes and then rinse. 120 mL 11     lisinopril (ZESTRIL) 10 MG tablet every 24 hours       metFORMIN (GLUCOPHAGE) 500 MG tablet        Omega-3 Fatty Acids (FISH OIL BURP-LESS) 1000 MG CAPS Take 1 capsule by mouth 2 times daily 180 capsule 0     RESTASIS 0.05 % ophthalmic emulsion        rosuvastatin (CRESTOR) 10 MG tablet Take 1 tablet (10 mg) by mouth daily 90 tablet 3     tamsulosin (FLOMAX) 0.4 MG capsule Take 1 capsule (0.4 mg) by mouth 2 times daily 180 capsule 2     urea (GORDONS UREA) 40 % external ointment Apply to yellow thick areas on feet 1-2x daily 30 g 3     vitamin D3 (CHOLECALCIFEROL) 50 mcg (2000 units) tablet Take 2 tablets (100 mcg) by mouth daily 180 tablet 2     Allergies   Allergen Reactions     No Known Allergies      Pneumonia Vaccine:For adults 65 years or older who do not have an immunocompromising condition, cerebrospinal fluid leak, or cochlear implant and want to receive PPSV23 ONLY: Administer 1 dose of PPSV23. Anyone who received any doses of PPSV23 before age 65 should receive 1 final dose of the vaccine at age 65 or older. Administer this last dose at least 5 years after the prior PPSV23 dose.        Review of Systems  Constitutional, HEENT, cardiovascular, pulmonary, gi and gu systems are negative, except as otherwise noted.    OBJECTIVE:   /52 (BP Location: Left arm, Patient Position: Sitting, Cuff Size: Adult Regular)   Pulse 92   Ht 1.702 m (5' 7\")   Wt 83.6 kg (184 lb 4 oz)   SpO2 99%   BMI 28.86 kg/m     Estimated body mass index is 28.86 kg/m  as calculated from the following:    Height as of this encounter: 1.702 m (5' 7\").    Weight as of this encounter: 83.6 kg (184 lb 4 oz).  Physical Exam  GENERAL: healthy, alert and no distress  EYES: Eyes " grossly normal to inspection, PERRL and conjunctivae and sclerae normal  HENT: ear canals and TM's normal, nose and mouth without ulcers or lesions  NECK: no adenopathy, no asymmetry, masses, or scars and thyroid normal to palpation  RESP: lungs clear to auscultation - no rales, rhonchi or wheezes  CV: regular rate and rhythm, normal S1 S2, no S3 or S4, no murmur, click or rub, no peripheral edema and peripheral pulses strong  MS: no gross musculoskeletal defects noted, no edema  PSYCH: mentation appears normal, affect normal    Diagnostic Test Results:  Labs reviewed in Epic    ASSESSMENT / PLAN:   Jordan was seen today for medicare visit.    Diagnoses and all orders for this visit:    Welcome to Medicare preventive visit  Comments:  workout 2-3 hr every day , trying to get rid of all his medications     Type 2 diabetes mellitus without complication, without long-term current use of insulin (H)  Comments:  metforme 500mg one tab daily with glimpride 1mg last A1c 6.6, will change the medication if needed based on the most recent result which he will be doing the la  Orders:  -     HEMOGLOBIN A1C; Future  -     OPTOMETRY REFERRAL; Future    Screen for colon cancer  Comments:  decide to do cologuard , patient had one normal colonoscopy  Orders:  -     Fecal colorectal cancer screen FIT - Future (S+30); Future  -     COLOGUARD(EXACT SCIENCES)    Screening for AAA (abdominal aortic aneurysm)  Comments:  no risk   Orders:  -     Abdominal Aortic Aneurysm Screening/Tracking    Hypertension, unspecified type    Benign prostatic hyperplasia with urinary obstruction  Comments:  stable on flomax , but feel effect of medication wearing off , if patient desire will have him see urologist for surgical consult     Cortical age-related cataract of both eyes  Comments:  no need for surgery yet     Screening for prostate cancer  -     PSA, screen; Future    Cognitive impairment  Comments:  overall doing well , does get B12 and  "vitamin D level checked , does take supplement on regular basis   Orders:  -     Vitamin B12; Future    Vitamin D deficiency  -     Vitamin D Deficiency; Future    Dyslipidemia    Skin cyst  Comments:  left upper breast area .    Other orders  -     REVIEW OF HEALTH MAINTENANCE PROTOCOL ORDERS  -     TDAP VACCINE (Adacel, Boostrix)  -     ZOSTER VACCINE RECOMBINANT ADJUVANTED (SHINGRIX); Future  -     PNEUMOCOC CONJ VAC 13 CESILIA (MNVAC) ([id number]); Future  -     COVID-19,PF,PFIZER (12+ YRS); Future  -     PNEUMOCOC CONJ VAC 13 CESILIA ([facility]NVAC) ([id number])        Patient has been advised of split billing requirements and indicates understanding: Yes  COUNSELING:  Reviewed preventive health counseling, as reflected in patient instructions       Consider AAA screening for ages 65-75 and smoking history       Regular exercise       Healthy diet/nutrition       Vision screening       Hearing screening       Fall risk prevention       Alcohol Use        Hepatitis C screening       Colon cancer screening       Prostate cancer screening    Estimated body mass index is 28.86 kg/m  as calculated from the following:    Height as of this encounter: 1.702 m (5' 7\").    Weight as of this encounter: 83.6 kg (184 lb 4 oz).    Weight management plan: Discussed healthy diet and exercise guidelines    She reports that he has never smoked. He has never used smokeless tobacco.      Appropriate preventive services were discussed with this patient, including applicable screening as appropriate for cardiovascular disease, diabetes, osteopenia/osteoporosis, and glaucoma.  As appropriate for age/gender, discussed screening for colorectal cancer, prostate cancer, breast cancer, and cervical cancer. Checklist reviewing preventive services available has been given to the patient.    Reviewed patients plan of care and provided an AVS. The Basic Care Plan (routine screening as documented in Health Maintenance) for Eunice meets the Care Plan requirement. " This Care Plan has been established and reviewed with the Patient.    Counseling Resources:  ATP IV Guidelines  Pooled Cohorts Equation Calculator  Breast Cancer Risk Calculator  Breast Cancer: Medication to Reduce Risk  FRAX Risk Assessment  ICSI Preventive Guidelines  Dietary Guidelines for Americans, 2010  USDA's MyPlate  ASA Prophylaxis  Lung CA Screening    Mira Han MD  [facility]    Identified Health Risks:

## 2022-05-13 ASSESSMENT — PATIENT HEALTH QUESTIONNAIRE - PHQ9: SUM OF ALL RESPONSES TO PHQ QUESTIONS 1-9: 0

## 2022-05-15 ENCOUNTER — HEALTH MAINTENANCE LETTER (OUTPATIENT)
Age: 67
End: 2022-05-15

## 2022-05-20 ENCOUNTER — LAB (OUTPATIENT)
Dept: LAB | Facility: CLINIC | Age: 67
End: 2022-05-20
Payer: COMMERCIAL

## 2022-05-20 DIAGNOSIS — E55.9 VITAMIN D DEFICIENCY: ICD-10-CM

## 2022-05-20 DIAGNOSIS — Z12.5 SCREENING FOR PROSTATE CANCER: ICD-10-CM

## 2022-05-20 DIAGNOSIS — R41.89 COGNITIVE IMPAIRMENT: ICD-10-CM

## 2022-05-20 DIAGNOSIS — E11.9 TYPE 2 DIABETES MELLITUS WITHOUT COMPLICATION, WITHOUT LONG-TERM CURRENT USE OF INSULIN (H): ICD-10-CM

## 2022-05-20 LAB
DEPRECATED CALCIDIOL+CALCIFEROL SERPL-MC: 55 UG/L (ref 20–75)
HBA1C MFR BLD: 5.7 % (ref 0–5.6)
NONINV COLON CA DNA+OCC BLD SCRN STL QL: NORMAL
PSA SERPL-MCNC: 0.76 UG/L (ref 0–4.5)
VIT B12 SERPL-MCNC: 707 PG/ML (ref 213–816)

## 2022-05-20 PROCEDURE — 36415 COLL VENOUS BLD VENIPUNCTURE: CPT

## 2022-05-20 PROCEDURE — G0103 PSA SCREENING: HCPCS

## 2022-05-20 PROCEDURE — 82306 VITAMIN D 25 HYDROXY: CPT

## 2022-05-20 PROCEDURE — 83036 HEMOGLOBIN GLYCOSYLATED A1C: CPT

## 2022-05-20 PROCEDURE — 82607 VITAMIN B-12: CPT

## 2022-05-25 ENCOUNTER — OFFICE VISIT (OUTPATIENT)
Dept: DERMATOLOGY | Facility: CLINIC | Age: 67
End: 2022-05-25
Payer: COMMERCIAL

## 2022-05-25 ENCOUNTER — PATIENT OUTREACH (OUTPATIENT)
Dept: GERIATRIC MEDICINE | Facility: CLINIC | Age: 67
End: 2022-05-25

## 2022-05-25 VITALS — HEART RATE: 72 BPM | OXYGEN SATURATION: 97 % | SYSTOLIC BLOOD PRESSURE: 125 MMHG | DIASTOLIC BLOOD PRESSURE: 64 MMHG

## 2022-05-25 DIAGNOSIS — B07.8 COMMON WART: Primary | ICD-10-CM

## 2022-05-25 DIAGNOSIS — L21.9 DERMATITIS, SEBORRHEIC: ICD-10-CM

## 2022-05-25 DIAGNOSIS — L29.9 LOCALIZED PRURITUS: ICD-10-CM

## 2022-05-25 DIAGNOSIS — B35.1 ONYCHOMYCOSIS: ICD-10-CM

## 2022-05-25 DIAGNOSIS — L84 CALLUS: ICD-10-CM

## 2022-05-25 PROCEDURE — 17110 DESTRUCTION B9 LES UP TO 14: CPT | Performed by: PHYSICIAN ASSISTANT

## 2022-05-25 PROCEDURE — 99213 OFFICE O/P EST LOW 20 MIN: CPT | Mod: 25 | Performed by: PHYSICIAN ASSISTANT

## 2022-05-25 RX ORDER — CICLOPIROX 80 MG/ML
SOLUTION TOPICAL
Qty: 6.6 ML | Refills: 4 | Status: SHIPPED | OUTPATIENT
Start: 2022-05-25 | End: 2022-11-09

## 2022-05-25 RX ORDER — FLUOROURACIL 50 MG/G
CREAM TOPICAL
Qty: 40 G | Refills: 3 | Status: SHIPPED | OUTPATIENT
Start: 2022-05-25 | End: 2022-11-09

## 2022-05-25 RX ORDER — FLUOCINONIDE TOPICAL SOLUTION USP, 0.05% 0.5 MG/ML
SOLUTION TOPICAL
Qty: 60 ML | Refills: 6 | Status: SHIPPED | OUTPATIENT
Start: 2022-05-25 | End: 2022-11-09

## 2022-05-25 RX ORDER — KETOCONAZOLE 20 MG/ML
SHAMPOO TOPICAL
Qty: 120 ML | Refills: 11 | Status: SHIPPED | OUTPATIENT
Start: 2022-05-25 | End: 2022-11-09

## 2022-05-25 NOTE — PATIENT INSTRUCTIONS
Warts  Nightly: Apply salicylic acid and efudex nighly, cover with duct tape. Remove in the morning and repeat nighlty.     Efudex is a topical chemotherapy medication. Do not use if you are pregnant or have a hypersensitive to to the drug, class, or any components of the medication. Very rare to have any systemic absorption.    Common reactions include and are not limited to:  Application site reaction, pruritis, pain, bleeding, edema, stinging, burning, scaling, crusting, erythema, photosensitivity, and allergic contact dermatitis. Do not let children get into medication and do not let pets eat medication.     Any questions or concerns please call or follow up in clinic.

## 2022-05-25 NOTE — PROGRESS NOTES
[facility] Care Coordination Contact     Jordan left message advising that he was on a trip for the last 2 months.   He is doing good and has no new needs at this time.   Care Coordinator called Jordan back asking if he established care with new PCP.         JD Srivastava  [facility]  Phone: [phone]

## 2022-05-25 NOTE — LETTER
5/25/2022         RE: Jordan Miller  [address] [place] [address]        Dear Colleague,    Thank you for referring your patient, Jordan Miller, to the [facility]. Please see a copy of my visit note below.    HPI:  Jordan Miller is a 67 year old male patient here today for warts on right 3rd finger .  Patient states this has been present for a while.  Patient reports the following symptoms: ln2 with improvement and otc with improvement. Did develop an additional  Wart on finger since LOV.  Patient reports the following previous treatments: ln2.  Patient reports the following modifying factors: none.  Associated symptoms: none.  Patient has no other skin complaints today.  Remainder of the HPI, Meds, PMH, Allergies, FH, and SH was reviewed in chart.      Past Medical History:   Diagnosis Date     BPH (benign prostatic hyperplasia)      Hyperlipidemia      Hypertension      Left ankle pain      Type 2 diabetes mellitus (H)        Past Surgical History:   Procedure Laterality Date     CHOLECYSTECTOMY      1994     HERNIA REPAIR, UMBILICAL      ?2013        History reviewed. No pertinent family history.    Social History     Socioeconomic History     Marital status:      Spouse name: Not on file     Number of children: Not on file     Years of education: Not on file     Highest education level: Not on file   Occupational History     Not on file   Tobacco Use     Smoking status: Never Smoker     Smokeless tobacco: Never Used   Substance and Sexual Activity     Alcohol use: Yes     Comment: 2-3 drinks a week hard liquor     Drug use: Never     Sexual activity: Not on file   Other Topics Concern     Not on file   Social History Narrative     Not on file     Social Determinants of Health     Financial Resource Strain: Not on file   Food Insecurity: Not on file   Transportation Needs: Not on file   Physical Activity: Not on file   Stress: Not on file   Social Connections: Not  on file   Intimate Partner Violence: Not on file   Housing Stability: Not on file       Outpatient Encounter Medications as of 5/25/2022   Medication Sig Dispense Refill     ACCU-CHEK GUIDE test strip Once daily 90 strip 0     Alcohol Swabs (ALCOHOL WIPES) 70 % PADS USE AS DIRECTED TO TEST BLOOD SUGARS 100 each 3     betamethasone valerate (VALISONE) 0.1 % external lotion Apply topically 2 times daily To face when flared. 60 mL 1     blood glucose (NO BRAND SPECIFIED) test strip 1 each by Other route       blood glucose monitoring (ACCU-CHEK ANAMARIA PLUS) meter device kit Use to test blood sugar 1 times daily or as directed. 1 kit 0     blood glucose monitoring (SOFTCLIX) lancets Use to test blood sugar 1 times daily or as directed. 100 each 0     cholecalciferol (VITAMIN D3) 125 mcg (5000 units) capsule        ciclopirox (PENLAC) 8 % external solution Apply to adjacent skin and affected nails daily.  Remove with alcohol every 7 days, then repeat. Apply for 48 weeks. 6.6 mL 4     cyanocobalamin (VITAMIN B-12) 1000 MCG tablet Take 1 tablet (1,000 mcg) by mouth every other day 90 tablet 2     fluocinonide (LIDEX) 0.05 % external solution Apply to affected area on scalp BID x 2-4 weeks, tapering with improvement. Do not apply to face or body folds. 60 mL 6     glimepiride (AMARYL) 1 MG tablet Take 1 tablet (1 mg) by mouth every morning (before breakfast) 90 tablet 0     ketoconazole (NIZORAL) 2 % external cream Apply topically 2 times daily To left foot for 4-6 weeks. 60 g 3     ketoconazole (NIZORAL) 2 % external shampoo Wet affected area daily, apply shampoo and lather, let sit for 3-5 minutes and then rinse. 120 mL 11     lisinopril (ZESTRIL) 10 MG tablet every 24 hours       metFORMIN (GLUCOPHAGE) 500 MG tablet        Omega-3 Fatty Acids (FISH OIL BURP-LESS) 1000 MG CAPS Take 1 capsule by mouth 2 times daily 180 capsule 0     RESTASIS 0.05 % ophthalmic emulsion        rosuvastatin (CRESTOR) 10 MG tablet Take 1 tablet  (10 mg) by mouth daily 90 tablet 3     tamsulosin (FLOMAX) 0.4 MG capsule Take 1 capsule (0.4 mg) by mouth 2 times daily 180 capsule 2     urea (GORDONS UREA) 40 % external ointment Apply to yellow thick areas on feet 1-2x daily 30 g 3     vitamin D3 (CHOLECALCIFEROL) 50 mcg (2000 units) tablet Take 2 tablets (100 mcg) by mouth daily 180 tablet 2     [DISCONTINUED] ciclopirox (PENLAC) 8 % external solution Apply to adjacent skin and affected nails daily.  Remove with alcohol every 7 days, then repeat. Apply for 48 weeks. 6.6 mL 4     [DISCONTINUED] fluocinonide (LIDEX) 0.05 % external solution Apply to affected area on scalp BID x 4-6 weeks, tapering with improvement. Do not apply to face or body folds. 60 mL 3     [DISCONTINUED] ketoconazole (NIZORAL) 2 % external shampoo Wet affected area daily, apply shampoo and lather, let sit for 3-5 minutes and then rinse. 120 mL 11     [DISCONTINUED] urea (GORDONS UREA) 40 % external ointment Apply to yellow thick areas on feet 1-2x daily 30 g 3     No facility-administered encounter medications on file as of 5/25/2022.       Review Of Systems:  Skin: warts  Eyes: negative  Ears/Nose/Throat: negative  Respiratory: No shortness of breath, dyspnea on exertion, cough, or hemoptysis  Cardiovascular: negative  Gastrointestinal: negative  Genitourinary: negative  Musculoskeletal: negative  Neurologic: negative  Psychiatric: negative  Hematologic/Lymphatic/Immunologic: negative  Endocrine: negative      Objective:     /64   Pulse 72   SpO2 97%   Eyes: Conjunctivae/lids: Normal   ENT: Lips:  Normal  MSK: Normal  Cardiovascular: Peripheral edema none  Pulm: Breathing Normal  Neuro/Psych: Orientation: A/O x 3. Normal; Mood/Affect: Normal, NAD, WDWN  Pt accompanied by: self  Following areas examined: face, ( wearing mask), neck, hands, ears  Crane skin type:iv   Findings:  Flesh-colored verrucous appearing papule/s x 2 on right 3rd finger  Pt defers exam  elsewhere    Assessment and Plan:     1) onychomycosis  Continue ciclopirox    2) calluses  Continue urea  3) Seborrheic Dermatitis, localized pruritis  Chronic condition that cannot be cured, but it can be managed.     Scalp:   nizoral Wash scalp daily with a medicated shampoo discussed with provider. Wet affected area 2-3x a week, apply shampoo and lather, let sit for 3-5 minutes and then rinse.   If multiple shampoos discussed begin  two prescription shampoos or one prescription shampoo and one over the counter shampoo  (examples: Head and shoulders, Selsen Blue, Ketoconazole, T-Sal, and/or T-gel.     Lidex: Apply a thin layer to affected area on scalp 2x a day x 4 weeks, tapering with improvement. Do not apply to face or body folds.   Side effects of topical steroids including but not limited to atrophy (skin thinning), striae (stretch marks) telangiectasias, steroid acne, and others. Do not apply to normal skin. Do not apply to discolored skin that does not have rash present.     3) common warts x 2  LN2: Treated with LN2 for 5s for 1-2 cycles. Warned risks of blistering, pain, pigment change, scarring, and incomplete resolution.  Advised patient to return if lesions do not completely resolve within 2-3 months.  Wound care sheet given.  Nightly: Apply salicylic acid and efudex nighly, cover with duct tape. Remove in the morning and repeat nighlty.   Efudex is a topical chemotherapy medication. Do not use if you are pregnant or have a hypersensitive to to the drug, class, or any components of the medication. Very rare to have any systemic absorption.  Common reactions include and are not limited to:  Application site reaction, pruritis, pain, bleeding, edema, stinging, burning, scaling, crusting, erythema, photosensitivity, and allergic contact dermatitis. Do not let children get into medication and do not let pets eat medication.  It was a pleasure speaking with Jordan Miller today.       Follow up in yearly          Again, thank you for allowing me to participate in the care of your patient.        Sincerely,        Amna Julian PA-C

## 2022-05-25 NOTE — PROGRESS NOTES
HPI:  Jordan Miller is a 67 year old male patient here today for warts on right 3rd finger .  Patient states this has been present for a while.  Patient reports the following symptoms: ln2 with improvement and otc with improvement. Did develop an additional  Wart on finger since LOV.  Patient reports the following previous treatments: ln2.  Patient reports the following modifying factors: none.  Associated symptoms: none.  Patient has no other skin complaints today.  Remainder of the HPI, Meds, PMH, Allergies, FH, and SH was reviewed in chart.      Past Medical History:   Diagnosis Date     BPH (benign prostatic hyperplasia)      Hyperlipidemia      Hypertension      Left ankle pain      Type 2 diabetes mellitus (H)        Past Surgical History:   Procedure Laterality Date     CHOLECYSTECTOMY      1994     HERNIA REPAIR, UMBILICAL      ?2013        History reviewed. No pertinent family history.    Social History     Socioeconomic History     Marital status:      Spouse name: Not on file     Number of children: Not on file     Years of education: Not on file     Highest education level: Not on file   Occupational History     Not on file   Tobacco Use     Smoking status: Never Smoker     Smokeless tobacco: Never Used   Substance and Sexual Activity     Alcohol use: Yes     Comment: 2-3 drinks a week hard liquor     Drug use: Never     Sexual activity: Not on file   Other Topics Concern     Not on file   Social History Narrative     Not on file     Social Determinants of Health     Financial Resource Strain: Not on file   Food Insecurity: Not on file   Transportation Needs: Not on file   Physical Activity: Not on file   Stress: Not on file   Social Connections: Not on file   Intimate Partner Violence: Not on file   Housing Stability: Not on file       Outpatient Encounter Medications as of 5/25/2022   Medication Sig Dispense Refill     ACCU-CHEK GUIDE test strip Once daily 90 strip 0     Alcohol Swabs (ALCOHOL  WIPES) 70 % PADS USE AS DIRECTED TO TEST BLOOD SUGARS 100 each 3     betamethasone valerate (VALISONE) 0.1 % external lotion Apply topically 2 times daily To face when flared. 60 mL 1     blood glucose (NO BRAND SPECIFIED) test strip 1 each by Other route       blood glucose monitoring (ACCU-CHEK ANAMARIA PLUS) meter device kit Use to test blood sugar 1 times daily or as directed. 1 kit 0     blood glucose monitoring (SOFTCLIX) lancets Use to test blood sugar 1 times daily or as directed. 100 each 0     cholecalciferol (VITAMIN D3) 125 mcg (5000 units) capsule        ciclopirox (PENLAC) 8 % external solution Apply to adjacent skin and affected nails daily.  Remove with alcohol every 7 days, then repeat. Apply for 48 weeks. 6.6 mL 4     cyanocobalamin (VITAMIN B-12) 1000 MCG tablet Take 1 tablet (1,000 mcg) by mouth every other day 90 tablet 2     fluocinonide (LIDEX) 0.05 % external solution Apply to affected area on scalp BID x 2-4 weeks, tapering with improvement. Do not apply to face or body folds. 60 mL 6     glimepiride (AMARYL) 1 MG tablet Take 1 tablet (1 mg) by mouth every morning (before breakfast) 90 tablet 0     ketoconazole (NIZORAL) 2 % external cream Apply topically 2 times daily To left foot for 4-6 weeks. 60 g 3     ketoconazole (NIZORAL) 2 % external shampoo Wet affected area daily, apply shampoo and lather, let sit for 3-5 minutes and then rinse. 120 mL 11     lisinopril (ZESTRIL) 10 MG tablet every 24 hours       metFORMIN (GLUCOPHAGE) 500 MG tablet        Omega-3 Fatty Acids (FISH OIL BURP-LESS) 1000 MG CAPS Take 1 capsule by mouth 2 times daily 180 capsule 0     RESTASIS 0.05 % ophthalmic emulsion        rosuvastatin (CRESTOR) 10 MG tablet Take 1 tablet (10 mg) by mouth daily 90 tablet 3     tamsulosin (FLOMAX) 0.4 MG capsule Take 1 capsule (0.4 mg) by mouth 2 times daily 180 capsule 2     urea (GORDONS UREA) 40 % external ointment Apply to yellow thick areas on feet 1-2x daily 30 g 3     vitamin  D3 (CHOLECALCIFEROL) 50 mcg (2000 units) tablet Take 2 tablets (100 mcg) by mouth daily 180 tablet 2     [DISCONTINUED] ciclopirox (PENLAC) 8 % external solution Apply to adjacent skin and affected nails daily.  Remove with alcohol every 7 days, then repeat. Apply for 48 weeks. 6.6 mL 4     [DISCONTINUED] fluocinonide (LIDEX) 0.05 % external solution Apply to affected area on scalp BID x 4-6 weeks, tapering with improvement. Do not apply to face or body folds. 60 mL 3     [DISCONTINUED] ketoconazole (NIZORAL) 2 % external shampoo Wet affected area daily, apply shampoo and lather, let sit for 3-5 minutes and then rinse. 120 mL 11     [DISCONTINUED] urea (GORDONS UREA) 40 % external ointment Apply to yellow thick areas on feet 1-2x daily 30 g 3     No facility-administered encounter medications on file as of 5/25/2022.       Review Of Systems:  Skin: warts  Eyes: negative  Ears/Nose/Throat: negative  Respiratory: No shortness of breath, dyspnea on exertion, cough, or hemoptysis  Cardiovascular: negative  Gastrointestinal: negative  Genitourinary: negative  Musculoskeletal: negative  Neurologic: negative  Psychiatric: negative  Hematologic/Lymphatic/Immunologic: negative  Endocrine: negative      Objective:     /64   Pulse 72   SpO2 97%   Eyes: Conjunctivae/lids: Normal   ENT: Lips:  Normal  MSK: Normal  Cardiovascular: Peripheral edema none  Pulm: Breathing Normal  Neuro/Psych: Orientation: A/O x 3. Normal; Mood/Affect: Normal, NAD, WDWN  Pt accompanied by: self  Following areas examined: face, ( wearing mask), neck, hands, ears  Crane skin type:iv   Findings:  Flesh-colored verrucous appearing papule/s x 2 on right 3rd finger  Pt defers exam elsewhere    Assessment and Plan:     1) onychomycosis  Continue ciclopirox    2) calluses  Continue urea  3) Seborrheic Dermatitis, localized pruritis  Chronic condition that cannot be cured, but it can be managed.     Scalp:   nizoral Wash scalp daily with a  medicated shampoo discussed with provider. Wet affected area 2-3x a week, apply shampoo and lather, let sit for 3-5 minutes and then rinse.   If multiple shampoos discussed begin  two prescription shampoos or one prescription shampoo and one over the counter shampoo  (examples: Head and shoulders, Selsen Blue, Ketoconazole, T-Sal, and/or T-gel.     Lidex: Apply a thin layer to affected area on scalp 2x a day x 4 weeks, tapering with improvement. Do not apply to face or body folds.   Side effects of topical steroids including but not limited to atrophy (skin thinning), striae (stretch marks) telangiectasias, steroid acne, and others. Do not apply to normal skin. Do not apply to discolored skin that does not have rash present.     3) common warts x 2  LN2: Treated with LN2 for 5s for 1-2 cycles. Warned risks of blistering, pain, pigment change, scarring, and incomplete resolution.  Advised patient to return if lesions do not completely resolve within 2-3 months.  Wound care sheet given.  Nightly: Apply salicylic acid and efudex nighly, cover with duct tape. Remove in the morning and repeat nighlty.   Efudex is a topical chemotherapy medication. Do not use if you are pregnant or have a hypersensitive to to the drug, class, or any components of the medication. Very rare to have any systemic absorption.  Common reactions include and are not limited to:  Application site reaction, pruritis, pain, bleeding, edema, stinging, burning, scaling, crusting, erythema, photosensitivity, and allergic contact dermatitis. Do not let children get into medication and do not let pets eat medication.  It was a pleasure speaking with Jordan Miller today.       Follow up in yearly

## 2022-05-31 ENCOUNTER — PATIENT OUTREACH (OUTPATIENT)
Dept: GERIATRIC MEDICINE | Facility: CLINIC | Age: 67
End: 2022-05-31
Payer: COMMERCIAL

## 2022-05-31 NOTE — PROGRESS NOTES
[facility] Care Coordination Contact      [facility] Six-Month Telephone Assessment    6 month telephone assessment completed on 05/31/22.    ER visits: No  Hospitalizations: No  TCU stays: No  Significant health status changes: None  Falls/Injuries: No  ADL/IADL changes: No  Changes in services: No    Caregiver Assessment follow up:  NA    Goals: See POC in chart for goal progress documentation.  Care Coordinator spoke to Billvenkatesh.   He asked for a copy of his new [employer] card.   Care Coordinator requested this for him.   He also reported that he changed to the [facility].  PCP updated on POC.   Jordan refused a visit.       Will see member in 6 months for an annual health risk assessment.   Encouraged member to call CC with any questions or concerns in the meantime.     JD Srivastava  [facility]  Phone: [phone]

## 2022-06-02 LAB — NONINV COLON CA DNA+OCC BLD SCRN STL QL: NEGATIVE

## 2022-06-08 ENCOUNTER — OFFICE VISIT (OUTPATIENT)
Dept: FAMILY MEDICINE | Facility: CLINIC | Age: 67
End: 2022-06-08
Payer: COMMERCIAL

## 2022-06-08 VITALS
HEIGHT: 67 IN | WEIGHT: 181.19 LBS | HEART RATE: 61 BPM | SYSTOLIC BLOOD PRESSURE: 112 MMHG | BODY MASS INDEX: 28.44 KG/M2 | DIASTOLIC BLOOD PRESSURE: 40 MMHG | OXYGEN SATURATION: 98 %

## 2022-06-08 DIAGNOSIS — N40.1 BENIGN PROSTATIC HYPERPLASIA WITH URINARY OBSTRUCTION: ICD-10-CM

## 2022-06-08 DIAGNOSIS — R79.89 LOW VITAMIN D LEVEL: ICD-10-CM

## 2022-06-08 DIAGNOSIS — N13.8 BENIGN PROSTATIC HYPERPLASIA WITH URINARY OBSTRUCTION: ICD-10-CM

## 2022-06-08 DIAGNOSIS — I10 HYPERTENSION, UNSPECIFIED TYPE: ICD-10-CM

## 2022-06-08 DIAGNOSIS — E11.9 TYPE 2 DIABETES MELLITUS WITHOUT COMPLICATION, WITHOUT LONG-TERM CURRENT USE OF INSULIN (H): Primary | ICD-10-CM

## 2022-06-08 PROCEDURE — 99213 OFFICE O/P EST LOW 20 MIN: CPT | Performed by: FAMILY MEDICINE

## 2022-06-08 RX ORDER — BLOOD SUGAR DIAGNOSTIC
STRIP MISCELLANEOUS
Qty: 90 STRIP | Refills: 0 | Status: SHIPPED | OUTPATIENT
Start: 2022-06-08 | End: 2022-08-10

## 2022-06-08 RX ORDER — CHOLECALCIFEROL (VITAMIN D3) 50 MCG
2 TABLET ORAL DAILY
Qty: 180 TABLET | Refills: 2 | Status: SHIPPED | OUTPATIENT
Start: 2022-06-08 | End: 2024-06-06

## 2022-06-08 RX ORDER — LISINOPRIL 5 MG/1
5 TABLET ORAL DAILY
Qty: 90 TABLET | Refills: 3 | Status: SHIPPED | OUTPATIENT
Start: 2022-06-08 | End: 2022-12-13

## 2022-06-08 NOTE — PROGRESS NOTES
Assessment & Plan       Jordan was seen today for results.    Diagnoses and all orders for this visit:    Type 2 diabetes mellitus without complication, without long-term current use of insulin (H)  -     metFORMIN (GLUCOPHAGE) 500 MG tablet; Take 1 tablet (500 mg) by mouth daily (with breakfast)  -     ACCU-CHEK GUIDE test strip; Once daily    Hypertension, unspecified type  Comments:  BP running into hypotensive range will cut down the dose of lisinopril  to 5 mg   Orders:  -     lisinopril (ZESTRIL) 5 MG tablet; Take 1 tablet (5 mg) by mouth daily    Benign prostatic hyperplasia with urinary obstruction    Low vitamin D level  -     vitamin D3 (CHOLECALCIFEROL) 50 mcg (2000 units) tablet; Take 2 tablets (100 mcg) by mouth daily                  Regular exercise  Follow up 6 month     No follow-ups on file.    Mira Han MD      Ollie Tafoya is a 67 year old who presents for the following health issues patient like to discuss his lab related to his chronic medical problem  For some reason patient is checking his blood pressure 6 times daily, which fluctuate between 110s to 150 /50-70  Patient also like his labs printed again in a different form at but all the labs are printed and  Instead of a grade as that is what he used to    HPI     Diabetes Follow-up    How often are you checking your blood sugar? Two times daily, patient had marked improvement in diabetes with the A1c of 5.7 with his very strict diet and rigorous exercise routine which she is following a 7 daily basis.  Take his metformin 500 mg only 4 times a week  Blood sugar testing frequency justification:  Risk of hypoglycemia with medication(s)  What time of day are you checking your blood sugars (select all that apply)?  Morning and night  Have you had any blood sugars above 200?  No  Have you had any blood sugars below 70?  No    What symptoms do you notice when your blood sugar is low?  Dizzy    What concerns do you have today about your  diabetes? None and Other: fluctuations     Do you have any of these symptoms? (Select all that apply)  No numbness or tingling in feet.  No redness, sores or blisters on feet.  No complaints of excessive thirst.  No reports of blurry vision.  No significant changes to weight.    Have you had a diabetic eye exam in the last 12 months? No      Hyperlipidemia Follow-Up      Are you regularly taking any medication or supplement to lower your cholesterol?   No    Are you having muscle aches or other side effects that you think could be caused by your cholesterol lowering medication?  No    Hypertension Follow-up      Do you check your blood pressure regularly outside of the clinic? Yes     Are you following a low salt diet? No    Are your blood pressures ever more than 140 on the top number (systolic) OR more   than 90 on the bottom number (diastolic), for example 140/90? Yes    BP Readings from Last 2 Encounters:   06/08/22 112/40   05/25/22 125/64     Hemoglobin A1C POCT (%)   Date Value   06/11/2021 6.4 (H)   04/23/2021 6.5 (H)     Hemoglobin A1C (%)   Date Value   05/20/2022 5.7 (H)   01/04/2022 6.6 (H)     LDL Cholesterol Calculated (mg/dL)   Date Value   01/04/2022 116   08/31/2021 113   04/23/2021 106 (H)   07/03/2019 82         How many servings of fruits and vegetables do you eat daily?  0-1    On average, how many sweetened beverages do you drink each day (Examples: soda, juice, sweet tea, etc.  Do NOT count diet or artificially sweetened beverages)?   0    How many days per week do you exercise enough to make your heart beat faster? 7    How many minutes a day do you exercise enough to make your heart beat faster? 60 or more    How many days per week do you miss taking your medication? 0        Patient Active Problem List   Diagnosis     Chronic pain of left ankle     Left knee pain     Type 2 diabetes mellitus without complication, without long-term current use of insulin (H)     Hypertension, unspecified  type     Benign prostatic hyperplasia with urinary obstruction     Dyslipidemia     Primary osteoarthritis of both knees     Achilles tendinosis of left lower extremity     Cognitive impairment     Vitamin D insufficiency     Bilateral cataracts     Skin cyst        Current Outpatient Medications   Medication     ACCU-CHEK GUIDE test strip     lisinopril (ZESTRIL) 5 MG tablet     metFORMIN (GLUCOPHAGE) 500 MG tablet     vitamin D3 (CHOLECALCIFEROL) 50 mcg (2000 units) tablet     Alcohol Swabs (ALCOHOL WIPES) 70 % PADS     betamethasone valerate (VALISONE) 0.1 % external lotion     blood glucose (NO BRAND SPECIFIED) test strip     blood glucose monitoring (ACCU-CHEK ANAMARIA PLUS) meter device kit     blood glucose monitoring (SOFTCLIX) lancets     cholecalciferol (VITAMIN D3) 125 mcg (5000 units) capsule     ciclopirox (PENLAC) 8 % external solution     cyanocobalamin (VITAMIN B-12) 1000 MCG tablet     fluocinonide (LIDEX) 0.05 % external solution     fluorouracil (EFUDEX) 5 % external cream     glimepiride (AMARYL) 1 MG tablet     ketoconazole (NIZORAL) 2 % external cream     ketoconazole (NIZORAL) 2 % external shampoo     Omega-3 Fatty Acids (FISH OIL BURP-LESS) 1000 MG CAPS     RESTASIS 0.05 % ophthalmic emulsion     rosuvastatin (CRESTOR) 10 MG tablet     salicylic acid (COMPOUND W MAX STRENGTH) 17 % external gel     tamsulosin (FLOMAX) 0.4 MG capsule     urea (GORDONS UREA) 40 % external ointment     No current facility-administered medications for this visit.        Social History     Tobacco Use     Smoking status: Never Smoker     Smokeless tobacco: Never Used   Substance Use Topics     Alcohol use: Yes     Comment: 2-3 drinks a week hard liquor     Drug use: Never          Review of Systems   Constitutional, HEENT, cardiovascular, pulmonary, GI, , musculoskeletal, neuro, skin, endocrine and psych systems are negative, except as otherwise noted.      Objective    LMP 06/01/2011   There is no height or weight  on file to calculate BMI.  Physical Exam   GENERAL: healthy, alert and no distress  EYES: Eyes grossly normal to inspection, PERRL and conjunctivae and sclerae normal  HENT: ear canals and TM's normal, nose and mouth without ulcers or lesions  RESP: lungs clear to auscultation - no rales, rhonchi or wheezes  CV: regular rate and rhythm, normal S1 S2, no S3 or S4, no murmur, click or rub, no peripheral edema and peripheral pulses strong  PSYCH: mentation appears normal, affect normal/bright    Lab on 05/20/2022   Component Date Value Ref Range Status     Hemoglobin A1C 05/20/2022 5.7 (A) 0.0 - 5.6 % Final    Normal <5.7%   Prediabetes 5.7-6.4%    Diabetes 6.5% or higher     Note: Adopted from ADA consensus guidelines.     Prostate Specific Antigen Screen 05/20/2022 0.76  0.00 - 4.50 ug/L Final     Vitamin B12 05/20/2022 707  213 - 816 pg/mL Final     Vitamin D, Total (25-Hydroxy) 05/20/2022 55  20 - 75 ug/L Final       Mira Han MD   [facility].  [phone]

## 2022-06-15 DIAGNOSIS — E11.9 TYPE 2 DIABETES MELLITUS WITHOUT COMPLICATION, WITHOUT LONG-TERM CURRENT USE OF INSULIN (H): ICD-10-CM

## 2022-06-15 NOTE — TELEPHONE ENCOUNTER
Insurance will only cover [employer] products. Please send new rx for meter, test strips, lancets, etc.

## 2022-06-21 RX ORDER — BLOOD SUGAR DIAGNOSTIC
STRIP MISCELLANEOUS
Qty: 100 STRIP | Refills: 6 | Status: SHIPPED | OUTPATIENT
Start: 2022-06-21 | End: 2023-11-28

## 2022-06-21 RX ORDER — GLUCOSAMINE HCL/CHONDROITIN SU 500-400 MG
CAPSULE ORAL
Qty: 100 EACH | Refills: 3 | Status: SHIPPED | OUTPATIENT
Start: 2022-06-21

## 2022-06-21 NOTE — TELEPHONE ENCOUNTER
Routing refill request to provider for review/approval because:  Drug not active on patient's medication list     DULCE Deshpande  [facility]

## 2022-07-26 DIAGNOSIS — E11.9 TYPE 2 DIABETES MELLITUS WITHOUT COMPLICATION, WITHOUT LONG-TERM CURRENT USE OF INSULIN (H): ICD-10-CM

## 2022-07-26 NOTE — TELEPHONE ENCOUNTER
"Should have refills on file.    Last Written Prescription Date:  06/08/2022  Last Fill Quantity: 90,  # refills: 4   Last office visit provider:  06/08/2022    Requested Prescriptions   Pending Prescriptions Disp Refills     metFORMIN (GLUCOPHAGE) 500 MG tablet 90 tablet 4     Sig: Take 1 tablet (500 mg) by mouth daily (with breakfast)       Biguanide Agents Passed - 7/26/2022  4:36 PM        Passed - Patient is age 10 or older        Passed - Patient has documented A1c within the specified period of time.     If HgbA1C is 8 or greater, it needs to be on file within the past 3 months.  If less than 8, must be on file within the past 6 months.     Recent Labs   Lab Test 05/20/22  0811   A1C 5.7*             Passed - Patient's CR is NOT>1.4 OR Patient's EGFR is NOT<45 within past 12 mos.     Recent Labs   Lab Test 01/04/22  0947 08/31/21  0747 04/23/21  0957   GFRESTIMATED 87   < > 87   GFRESTBLACK  --   --  >90    < > = values in this interval not displayed.       Recent Labs   Lab Test 01/04/22  0947   CR 0.97             Passed - Patient does NOT have a diagnosis of CHF.        Passed - Medication is active on med list        Passed - Recent (6 mo) or future (30 days) visit within the authorizing provider's specialty     Patient had office visit in the last 6 months or has a visit in the next 30 days with authorizing provider or within the authorizing provider's specialty.  See \"Patient Info\" tab in inbasket, or \"Choose Columns\" in Meds & Orders section of the refill encounter.                 Deepa Rashid 07/26/22 6:29 PM  "

## 2022-08-10 DIAGNOSIS — E11.9 TYPE 2 DIABETES MELLITUS WITHOUT COMPLICATION, WITHOUT LONG-TERM CURRENT USE OF INSULIN (H): ICD-10-CM

## 2022-08-10 RX ORDER — BLOOD SUGAR DIAGNOSTIC
STRIP MISCELLANEOUS
Qty: 180 STRIP | Refills: 0 | Status: SHIPPED | OUTPATIENT
Start: 2022-08-10 | End: 2022-11-07

## 2022-08-10 NOTE — TELEPHONE ENCOUNTER
Patient walked in requesting Metformin and Test strips. Called pharmacy to verify that he had refills- patient told pharmacy he would like metformin prescribed twice daily and also would like to test twice daily so needs new Rx for test strips. Patient is aware he is due for a diabetes follow up- His father just passed away and he is trying to fly back to [place]. He will schedule an appointment when he comes back from [place]. Per Dr. Emely hough to pend orders for these twice daily.   Alba Mas LPN

## 2022-08-19 ENCOUNTER — PATIENT OUTREACH (OUTPATIENT)
Dept: GERIATRIC MEDICINE | Facility: CLINIC | Age: 67
End: 2022-08-19

## 2022-09-04 ENCOUNTER — HEALTH MAINTENANCE LETTER (OUTPATIENT)
Age: 67
End: 2022-09-04

## 2022-09-26 ENCOUNTER — PATIENT OUTREACH (OUTPATIENT)
Dept: GERIATRIC MEDICINE | Facility: CLINIC | Age: 67
End: 2022-09-26

## 2022-09-26 NOTE — LETTER
October 17, 2022      JORDAN LOZANO  [address] [place] [address]        Dear Jordan:     I m your care coordinator. I ve been unable to reach you by phone. I am writing to ask you or an authorized representative to call me at [phone]. If you reach my voicemail, please leave a message with your daytime telephone number. . Include a date and time that I can call you. If you are hearing impaired, call the [place] Relay at 720 or [phone] (iebasl-hl-usumen relay service).     The reason I am trying to reach you is:    [] To schedule an assessment  [x] For your six (6)-month check-in  [] Other:      Please call me as soon as you receive this letter. I look forward to speaking with you.    Sincerely,    ODETTE Jeffery    E-mail: [email or web address]  Phone: [phone]      [facility] ([facility]) is a health plan that contracts with both Medicare and the [place] Medical Assistance (Medicaid) program to provide benefits of both programs to enrollees. Enrollment in [facility] depends on contract renewal.    [id number] accepted  [id number]                                                                         A (08/2022)

## 2022-09-26 NOTE — PROGRESS NOTES
[facility] Care Coordination Contact    Called member to complete six month assessment and left a message requesting a return call. Voicemail states his name, so number is correct.     Marian Barajas MOHIT, Manager   [facility]  [phone]

## 2022-10-06 NOTE — PROGRESS NOTES
[facility] Care Coordination Contact    Called member to complete six month assessment and left a message requesting a return call.    ODETTE Jeffery, Manager   [facility]  [phone]

## 2022-10-17 NOTE — PROGRESS NOTES
[facility] Care Coordination Contact    Called member to complete six month assessment and left a message requesting a return call.    Also, e-mailed on address within [facility] to see if he will respond to that.     ODETTE Jeffery, Manager   [facility]  [phone]

## 2022-11-06 DIAGNOSIS — E11.9 TYPE 2 DIABETES MELLITUS WITHOUT COMPLICATION, WITHOUT LONG-TERM CURRENT USE OF INSULIN (H): ICD-10-CM

## 2022-11-07 ENCOUNTER — PATIENT OUTREACH (OUTPATIENT)
Dept: GERIATRIC MEDICINE | Facility: CLINIC | Age: 67
End: 2022-11-07

## 2022-11-07 RX ORDER — BLOOD SUGAR DIAGNOSTIC
STRIP MISCELLANEOUS
Qty: 200 STRIP | Refills: 0 | Status: SHIPPED | OUTPATIENT
Start: 2022-11-07 | End: 2023-02-16

## 2022-11-07 NOTE — PROGRESS NOTES
[facility] Care Coordination Contact    TC from Billvenkatesh. He has been in [place] since August and returned home yesterday. His father passed away and he was helping his family.   He returned home yesterday and received my messages and letter.   Jordan requested support with finding a new dentist. CC provided him with [facility] dental connection information.   He also would like a new heart doctor closer to his home. [facility] He has an appt with his primary care doctor on 12/13 and will speak with him if a referral is needed.     CC offered health risk assessment to Jordan. He will think it over and let me know.     Marian Barajas [facility], Manager   [facility]  [phone]

## 2022-11-08 ENCOUNTER — PATIENT OUTREACH (OUTPATIENT)
Dept: GERIATRIC MEDICINE | Facility: CLINIC | Age: 67
End: 2022-11-08

## 2022-11-08 NOTE — TELEPHONE ENCOUNTER
"Due to be seen    Last Written Prescription Date:  8/10/22  Last Fill Quantity: 180,  # refills: 0   Last office visit provider:  6/8/22     Requested Prescriptions   Pending Prescriptions Disp Refills     ACCU-CHEK GUIDE test strip [Pharmacy Med Name: ACCU-CHEK GUIDE TEST STRIP] 200 strip      Sig: TEST TWICE DAILY       Diabetic Supplies Protocol Passed - 11/6/2022  9:11 AM        Passed - Medication is active on med list        Passed - Patient is 18 years of age or older        Passed - Recent (6 mo) or future (30 days) visit within the authorizing provider's specialty     Patient had office visit in the last 6 months or has a visit in the next 30 days with authorizing provider.  See \"Patient Info\" tab in inbasket, or \"Choose Columns\" in Meds & Orders section of the refill encounter.                 Barbara Johns RN 11/07/22 7:45 PM  "

## 2022-11-08 NOTE — PROGRESS NOTES
Encounter opened due to Regulatory [employer] Update to open FVP Program.      Deanna Olivarez  Care Management Specialist  [facility]  [phone]

## 2022-11-08 NOTE — PROGRESS NOTES
Encounter opened due to Regulatory [employer] Update to close FVP Program.      Deanna Olivarez  Care Management Specialist  [facility]  [phone]

## 2022-11-08 NOTE — PROGRESS NOTES
[facility] Care Coordination Contact    TC from Jordan. He was able to schedule a dental appt and would like me to order him a dental kit.   CC placed order through [facility] wellness program.     Jordan is open to having a health risk assessment completed. CC scheduled for 11/16 at 10am.     ODETTE Jeffery, Manager   [facility]  [phone]

## 2022-11-09 ENCOUNTER — TELEPHONE (OUTPATIENT)
Dept: DERMATOLOGY | Facility: CLINIC | Age: 67
End: 2022-11-09

## 2022-11-09 DIAGNOSIS — B35.1 ONYCHOMYCOSIS: ICD-10-CM

## 2022-11-09 DIAGNOSIS — L84 CALLUS: ICD-10-CM

## 2022-11-09 DIAGNOSIS — L21.9 DERMATITIS, SEBORRHEIC: ICD-10-CM

## 2022-11-09 DIAGNOSIS — B07.8 COMMON WART: ICD-10-CM

## 2022-11-09 RX ORDER — CICLOPIROX 80 MG/ML
SOLUTION TOPICAL
Qty: 6.6 ML | Refills: 4 | Status: SHIPPED | OUTPATIENT
Start: 2022-11-09 | End: 2023-05-12

## 2022-11-09 RX ORDER — FLUOROURACIL 50 MG/G
CREAM TOPICAL
Qty: 40 G | Refills: 3 | Status: SHIPPED | OUTPATIENT
Start: 2022-11-09 | End: 2023-05-12

## 2022-11-09 RX ORDER — FLUOCINONIDE TOPICAL SOLUTION USP, 0.05% 0.5 MG/ML
SOLUTION TOPICAL
Qty: 60 ML | Refills: 6 | Status: SHIPPED | OUTPATIENT
Start: 2022-11-09 | End: 2023-05-12

## 2022-11-09 RX ORDER — KETOCONAZOLE 20 MG/ML
SHAMPOO TOPICAL
Qty: 120 ML | Refills: 11 | Status: SHIPPED | OUTPATIENT
Start: 2022-11-09 | End: 2023-05-12

## 2022-11-09 NOTE — TELEPHONE ENCOUNTER
[facility] [address] [facility] [place]. All the medications that Amna has prescribed.   Jordan stopped in to see if he could get in today.     Thank you   flexible sigmoidoscopy

## 2022-11-09 NOTE — TELEPHONE ENCOUNTER
Pt requesting refills of all medications Mildred prescribed. Last seen 5/25/22. Pt has appt with Dr. Zayas 2/15/22. Orders pended, please send to pharmacy if you can. If you cannot refill please send back to me so I can inform patient.    Alison DIAAN RN  Dermatology   [phone]

## 2022-11-11 ENCOUNTER — TELEPHONE (OUTPATIENT)
Dept: DERMATOLOGY | Facility: CLINIC | Age: 67
End: 2022-11-11

## 2022-11-11 DIAGNOSIS — B07.8 OTHER VIRAL WARTS: Primary | ICD-10-CM

## 2022-11-11 RX ORDER — UREA 200 MG/G
CREAM TOPICAL PRN
Qty: 480 G | Refills: 4 | Status: SHIPPED | OUTPATIENT
Start: 2022-11-11 | End: 2022-12-13

## 2022-11-16 ENCOUNTER — PATIENT OUTREACH (OUTPATIENT)
Dept: GERIATRIC MEDICINE | Facility: CLINIC | Age: 67
End: 2022-11-16

## 2022-11-16 ASSESSMENT — ACTIVITIES OF DAILY LIVING (ADL): DEPENDENT_IADLS:: INDEPENDENT

## 2022-11-16 NOTE — PROGRESS NOTES
[facility] Care Coordination Contact    11/8/22:   Called member to schedule annual HRA home visit. HRA has been scheduled for 11/16/22.     ODETTE Jeffery, Manager   [facility]  [phone]

## 2022-11-16 NOTE — PROGRESS NOTES
[facility] Care Coordination Contact    [facility] Initial Assessment     Home visit for Initial Health Risk Assessment with Jordan Miller completed on November 16, 2022    Type of residence:: Private home - stairs  Current living arrangement:: I live in a private home with family. Jordan and his spouse live with their daughter, son-in-law and 3 year old grandson.     Assessment completed with:: Patient    Current Care Plan  Member currently receiving the following home care services:N/A     Member currently receiving the following community resources: None      Medication Review  Medication reconciliation completed in Epic: Yes  Medication set-up & administration: Independent-does not set up.  Self-administers medications.  Medication Risk Assessment Medication (1 or more, place referral to MTM): N/A: No risk factors identified  MTM Referral Placed: No: No risk factors idenified    Mental/Behavioral Health   Depression Screening:   PHQ-2 Total Score (Adult) - Positive if 3 or more points; Administer PHQ-9 if positive: 0       Mental health DX:: No          Falls Assessment:   Fallen 2 or more times in the past year?: No   Any fall with injury in the past year?: No    ADL/IADL Dependencies:   Dependent ADLs:: Independent  Dependent IADLs:: Independent    [facility] sponsored benefits: Shared information re: Silver Sneakers/gym memberships, ASA, Calcium +D.    PCA Assessment completed at visit: Not Applicable     Elderly Waiver Eligibility: No-does not meet criteria    Care Plan & Recommendations: Jordan prides himself on being independent and active. He goes to the gym 7 days a week and tracks all his activity with his watch.   He stated that due to his diet and activity he has been able to lower his A1C and manage his diabetes better.   He loves spending time with his grandson.     Jordan has a bill from Pharmacy with a date of service 8/13/21. We called the pharmacy and they stated they attempted to  bill [place] Medicaid several times, but claim was rejected.   TC to "[employer]" customer service. Jordan had a gap in coverage with [employer] from 2/1/21-12/1/21, so was not covered with [employer] during the time of the bill. Unable to do an insurance check due to the date being outside the look back period. I advised Jordan call [facility] to see if they could tell him any information about that period of time.   He tried to fill a prescription for Urea cream that was given to him by his dermatologist. The prescription is not covered. [facility] customer service informed us this is not a covered medication under Medicare part D due to it being an unapproved drug. Per the formulary there is nothing else similar that is covered.     See [facility] for detailed assessment information.    Follow-Up Plan: Member informed of future contact, plan to f/u with member with a 6 month telephone assessment.  Contact information shared with member and family, encouraged member to call with any questions or concerns at any time.    [facility] care continuum providers: Please see Snapshot and Care Management Flowsheets for Specific details of care plan.    This CC note routed to PCP.     ODETET Jeffery, Manager   [facility]  [phone]

## 2022-11-21 ENCOUNTER — OFFICE VISIT (OUTPATIENT)
Dept: DERMATOLOGY | Facility: CLINIC | Age: 67
End: 2022-11-21
Payer: COMMERCIAL

## 2022-11-21 DIAGNOSIS — B07.9 VERRUCA VULGARIS: Primary | ICD-10-CM

## 2022-11-21 PROCEDURE — 99213 OFFICE O/P EST LOW 20 MIN: CPT | Mod: 25 | Performed by: STUDENT IN AN ORGANIZED HEALTH CARE EDUCATION/TRAINING PROGRAM

## 2022-11-21 PROCEDURE — 17110 DESTRUCTION B9 LES UP TO 14: CPT | Performed by: STUDENT IN AN ORGANIZED HEALTH CARE EDUCATION/TRAINING PROGRAM

## 2022-11-21 NOTE — PROGRESS NOTES
[facility] Dermatology Note    Encounter Date: Nov 21, 2022    Dermatology Problem List:  -  ______________________________________    Impression/Plan:  Jordan was seen today for derm problem.    Diagnoses and all orders for this visit:    Verruca vulgaris (chronic flaring)  -     DESTRUCT BENIGN LESION, UP TO 14  - see procedure note  - rec otc 40% sal acid pads      Cryotherapy procedure note: After verbal consent and discussion of risks and benefits including but no limited to dyspigmentation/scar, blister, and pain, 2 warts L middle finger was(were) treated with 1-2mm freeze border for 2 cycles with liquid nitrogen. Post cryotherapy instructions were provided.       Follow-up in 6 weeks.       Staff Involved:  Staff Only    Jassi Zayas MD   of Dermatology  Department of Dermatology  [facility]      CC:   Chief Complaint   Patient presents with     Derm Problem     Follow up med        History of Present Illness:  Mr. Jordan Miller is a 67 year old male who presents as a return patient.    Warts not responding to LN2    Labs:      Physical exam:  Vitals: There were no vitals taken for this visit.  GEN: well developed, well-nourished, in no acute distress, in a pleasant mood.     SKIN: Crane phototype 3  - Focused examination of the hands was performed.  - verrucous papules L index finger   - No other lesions of concern on areas examined.     Past Medical History:   Past Medical History:   Diagnosis Date     BPH (benign prostatic hyperplasia)      Hyperlipidemia      Hypertension      Left ankle pain      Type 2 diabetes mellitus (H)      Past Surgical History:   Procedure Laterality Date     CHOLECYSTECTOMY      1994     HERNIA REPAIR, UMBILICAL      ?2013       Social History:   reports that he has never smoked. He has never used smokeless tobacco. He reports current alcohol use. He reports that he does not use drugs.    Family  History:  History reviewed. No pertinent family history.    Medications:  Current Outpatient Medications   Medication Sig Dispense Refill     ACCU-CHEK GUIDE test strip TEST TWICE DAILY 200 strip 0     alcohol swab prep pads Use to swab area of injection/loraine as directed. 100 each 3     Alcohol Swabs (ALCOHOL WIPES) 70 % PADS USE AS DIRECTED TO TEST BLOOD SUGARS 100 each 3     blood glucose (NO BRAND SPECIFIED) test strip 1 each by Other route       blood glucose (ONETOUCH VERIO IQ) test strip Use to test blood sugar1  times daily. 100 strip 6     blood glucose calibration (ONETOUCH VERIO) solution Use to calibrate blood glucose monitor as directed. 1 each 1     blood glucose monitoring (ONETOUCH VERIO IQ SYSTEM) meter device kit Use to test blood sugar 1 times daily. 1 kit 0     cholecalciferol (VITAMIN D3) 125 mcg (5000 units) capsule        ciclopirox (PENLAC) 8 % external solution Apply to adjacent skin and affected nails daily.  Remove with alcohol every 7 days, then repeat. Apply for 48 weeks. 6.6 mL 4     cyanocobalamin (VITAMIN B-12) 1000 MCG tablet Take 1 tablet (1,000 mcg) by mouth every other day 90 tablet 2     fluocinonide (LIDEX) 0.05 % external solution Apply to affected area on scalp BID x 2-4 weeks, tapering with improvement. Do not apply to face or body folds. 60 mL 6     fluorouracil (EFUDEX) 5 % external cream Apply to warts on right 3rd finger at night. Cover with duct tape and remove in the morning. 40 g 3     glimepiride (AMARYL) 1 MG tablet Take 1 tablet (1 mg) by mouth every morning (before breakfast) 90 tablet 0     ketoconazole (NIZORAL) 2 % external shampoo Wet affected area daily, apply shampoo and lather, let sit for 3-5 minutes and then rinse. 120 mL 11     lisinopril (ZESTRIL) 5 MG tablet Take 1 tablet (5 mg) by mouth daily 90 tablet 3     metFORMIN (GLUCOPHAGE) 500 MG tablet Take 1 tablet (500 mg) by mouth 2 times daily (with meals) 180 tablet 3     Omega-3 Fatty Acids (FISH OIL  BURP-LESS) 1000 MG CAPS Take 1 capsule by mouth 2 times daily 180 capsule 0     RESTASIS 0.05 % ophthalmic emulsion        rosuvastatin (CRESTOR) 10 MG tablet Take 1 tablet (10 mg) by mouth daily 90 tablet 3     salicylic acid (COMPOUND W MAX STRENGTH) 17 % external gel Apply to warts on right 3rd finger at night ( mix with efudex) and cover with duct tape. Remove in the morning and restart every evening. 14 g 4     tamsulosin (FLOMAX) 0.4 MG capsule Take 1 capsule (0.4 mg) by mouth 2 times daily 180 capsule 2     urea (GORDONS UREA) 40 % external ointment Apply 1-2x daily as needed 30 g 4     urea (GORDONS UREA) 40 % external ointment Apply to yellow thick areas on feet 1-2x daily 30 g 3     urea (GORMEL) 20 % external cream Apply topically as needed (for thick skin) 480 g 4     vitamin D3 (CHOLECALCIFEROL) 50 mcg (2000 units) tablet Take 2 tablets (100 mcg) by mouth daily 180 tablet 2     betamethasone valerate (VALISONE) 0.1 % external lotion Apply topically 2 times daily To face when flared. (Patient not taking: Reported on 11/21/2022) 60 mL 1     ketoconazole (NIZORAL) 2 % external cream Apply topically 2 times daily To left foot for 4-6 weeks. (Patient not taking: Reported on 11/21/2022) 60 g 3     Allergies   Allergen Reactions     No Known Allergies

## 2022-11-21 NOTE — LETTER
11/21/2022         RE: Jordan Miller  [address] [place] [address]        Dear Colleague,    Thank you for referring your patient, Jordan Miller, to the [facility]. Please see a copy of my visit note below.    [facility] Dermatology Note    Encounter Date: Nov 21, 2022    Dermatology Problem List:  -  ______________________________________    Impression/Plan:  Jordan was seen today for derm problem.    Diagnoses and all orders for this visit:    Verruca vulgaris (chronic flaring)  -     DESTRUCT BENIGN LESION, UP TO 14  - see procedure note  - rec otc 40% sal acid pads      Cryotherapy procedure note: After verbal consent and discussion of risks and benefits including but no limited to dyspigmentation/scar, blister, and pain, 2 warts L middle finger was(were) treated with 1-2mm freeze border for 2 cycles with liquid nitrogen. Post cryotherapy instructions were provided.       Follow-up in 6 weeks.       Staff Involved:  Staff Only    Jassi Zayas MD   of Dermatology  Department of Dermatology  [facility]      CC:   Chief Complaint   Patient presents with     Derm Problem     Follow up med        History of Present Illness:  Mr. Jordan Miller is a 67 year old male who presents as a return patient.    Warts not responding to LN2    Labs:      Physical exam:  Vitals: There were no vitals taken for this visit.  GEN: well developed, well-nourished, in no acute distress, in a pleasant mood.     SKIN: Crane phototype 3  - Focused examination of the hands was performed.  - verrucous papules L index finger   - No other lesions of concern on areas examined.     Past Medical History:   Past Medical History:   Diagnosis Date     BPH (benign prostatic hyperplasia)      Hyperlipidemia      Hypertension      Left ankle pain      Type 2 diabetes mellitus (H)      Past Surgical History:   Procedure Laterality Date      CHOLECYSTECTOMY      1994     HERNIA REPAIR, UMBILICAL      ?2013       Social History:   reports that he has never smoked. He has never used smokeless tobacco. He reports current alcohol use. He reports that he does not use drugs.    Family History:  History reviewed. No pertinent family history.    Medications:  Current Outpatient Medications   Medication Sig Dispense Refill     ACCU-CHEK GUIDE test strip TEST TWICE DAILY 200 strip 0     alcohol swab prep pads Use to swab area of injection/loraine as directed. 100 each 3     Alcohol Swabs (ALCOHOL WIPES) 70 % PADS USE AS DIRECTED TO TEST BLOOD SUGARS 100 each 3     blood glucose (NO BRAND SPECIFIED) test strip 1 each by Other route       blood glucose (ONETOUCH VERIO IQ) test strip Use to test blood sugar1  times daily. 100 strip 6     blood glucose calibration (ONETOUCH VERIO) solution Use to calibrate blood glucose monitor as directed. 1 each 1     blood glucose monitoring (ONETOUCH VERIO IQ SYSTEM) meter device kit Use to test blood sugar 1 times daily. 1 kit 0     cholecalciferol (VITAMIN D3) 125 mcg (5000 units) capsule        ciclopirox (PENLAC) 8 % external solution Apply to adjacent skin and affected nails daily.  Remove with alcohol every 7 days, then repeat. Apply for 48 weeks. 6.6 mL 4     cyanocobalamin (VITAMIN B-12) 1000 MCG tablet Take 1 tablet (1,000 mcg) by mouth every other day 90 tablet 2     fluocinonide (LIDEX) 0.05 % external solution Apply to affected area on scalp BID x 2-4 weeks, tapering with improvement. Do not apply to face or body folds. 60 mL 6     fluorouracil (EFUDEX) 5 % external cream Apply to warts on right 3rd finger at night. Cover with duct tape and remove in the morning. 40 g 3     glimepiride (AMARYL) 1 MG tablet Take 1 tablet (1 mg) by mouth every morning (before breakfast) 90 tablet 0     ketoconazole (NIZORAL) 2 % external shampoo Wet affected area daily, apply shampoo and lather, let sit for 3-5 minutes and then rinse.  120 mL 11     lisinopril (ZESTRIL) 5 MG tablet Take 1 tablet (5 mg) by mouth daily 90 tablet 3     metFORMIN (GLUCOPHAGE) 500 MG tablet Take 1 tablet (500 mg) by mouth 2 times daily (with meals) 180 tablet 3     Omega-3 Fatty Acids (FISH OIL BURP-LESS) 1000 MG CAPS Take 1 capsule by mouth 2 times daily 180 capsule 0     RESTASIS 0.05 % ophthalmic emulsion        rosuvastatin (CRESTOR) 10 MG tablet Take 1 tablet (10 mg) by mouth daily 90 tablet 3     salicylic acid (COMPOUND W MAX STRENGTH) 17 % external gel Apply to warts on right 3rd finger at night ( mix with efudex) and cover with duct tape. Remove in the morning and restart every evening. 14 g 4     tamsulosin (FLOMAX) 0.4 MG capsule Take 1 capsule (0.4 mg) by mouth 2 times daily 180 capsule 2     urea (GORDONS UREA) 40 % external ointment Apply 1-2x daily as needed 30 g 4     urea (GORDONS UREA) 40 % external ointment Apply to yellow thick areas on feet 1-2x daily 30 g 3     urea (GORMEL) 20 % external cream Apply topically as needed (for thick skin) 480 g 4     vitamin D3 (CHOLECALCIFEROL) 50 mcg (2000 units) tablet Take 2 tablets (100 mcg) by mouth daily 180 tablet 2     betamethasone valerate (VALISONE) 0.1 % external lotion Apply topically 2 times daily To face when flared. (Patient not taking: Reported on 11/21/2022) 60 mL 1     ketoconazole (NIZORAL) 2 % external cream Apply topically 2 times daily To left foot for 4-6 weeks. (Patient not taking: Reported on 11/21/2022) 60 g 3     Allergies   Allergen Reactions     No Known Allergies                    Again, thank you for allowing me to participate in the care of your patient.        Sincerely,        Jassi Zayas MD

## 2022-11-22 ENCOUNTER — PATIENT OUTREACH (OUTPATIENT)
Dept: GERIATRIC MEDICINE | Facility: CLINIC | Age: 67
End: 2022-11-22

## 2022-11-22 NOTE — LETTER
November 22, 2022      JORDAN LOZANO  [address] [place] [address]      Dear Jordan:    At [facility], we re dedicated to improving your health and wellness. Enclosed is the Care Plan developed with you on 11/16/2022. Please review the Care Plan carefully.    As a reminder, during your visit we talked about:    Ways to manage your physical and mental health    Using health care to maintain and improve your health     Your preventive care needs     Remember to contact your care coordinator if you:    Are hospitalized, or plan to be hospitalized     Have a fall      Have a change in your physical or mental health    Need help finding support or services    If you have questions, or don t agree with your Care Plan, call me at [phone]. You can also call me if your needs change. TTY users, call the [place] [employer] at (588) or 1-624.421.1614 (ljyqcv-hl-awzcgk relay service).    Sincerely,    ODETTE Jeffery    E-mail: [email or web address]  Phone: [phone]      [facility] ([facility]) is a health plan that contracts with both Medicare and the [place] Medical Assistance (Medicaid) program to provide benefits of both programs to enrollees. Enrollment in [facility] depends on contract renewal.    [id number] accepted    [id number] (07/2022)

## 2022-11-22 NOTE — PROGRESS NOTES
[facility] Care Coordination Contact    Received after visit chart from care coordinator.  Completed following tasks: Mailed copy of care plan to client, Mailed copy of POC signature sheet for member to sign and return in SASE  and Mailed [facility] Medication Disposal    and Provider Signature - No POC Shared:  Member indicates that they do not want their POC shared with any [facility] providers.     Deanna Olivarez  Care Management Specialist  [facility]  [phone]

## 2022-11-28 ENCOUNTER — OFFICE VISIT (OUTPATIENT)
Dept: CARDIOLOGY | Facility: CLINIC | Age: 67
End: 2022-11-28
Payer: COMMERCIAL

## 2022-11-28 VITALS
OXYGEN SATURATION: 100 % | DIASTOLIC BLOOD PRESSURE: 50 MMHG | RESPIRATION RATE: 16 BRPM | HEART RATE: 65 BPM | SYSTOLIC BLOOD PRESSURE: 128 MMHG | BODY MASS INDEX: 29.44 KG/M2 | WEIGHT: 188 LBS

## 2022-11-28 DIAGNOSIS — E78.5 DYSLIPIDEMIA: ICD-10-CM

## 2022-11-28 DIAGNOSIS — I10 HYPERTENSION, UNSPECIFIED TYPE: ICD-10-CM

## 2022-11-28 DIAGNOSIS — R07.2 PRECORDIAL PAIN: Primary | ICD-10-CM

## 2022-11-28 PROCEDURE — 99204 OFFICE O/P NEW MOD 45 MIN: CPT | Performed by: INTERNAL MEDICINE

## 2022-11-28 NOTE — PROGRESS NOTES
[facility] [address], [place] [address]   [email or web address]   OFFICE: [phone]          Thank you Dr. Ku ref. provider found for asking the [facility] team to participate in the care of your patient, Jordan Miller.     Impression and Plan     1.  Chest discomfort.  Chest discomfort symptoms are a bit atypical for cardiac etiology, ischemic or otherwise.  Nonetheless, he does have a variety of risk factors for development of coronary disease including diabetes mellitus, hypertension, dyslipidemia, and early onset coronary disease in his family.  Cannot fully discount possible ischemic contribution to his symptoms.  Plan:    Stress echocardiogram (of note, he was able to exercise for a little over 11 minutes and achieved 13 METS on his stress test a few years ago).    2.  Hypertension.  Blood pressure is reasonable in the office today at 128/50 mmHg.    3.  Dyslipidemia.  Lipid profile for January 2022 revealed  mg/dL and HDL 71 mg/dL.  Ideally would like to suppress LDL less than 70 mg/dL if possible. Jordan indicates that he is reticent to initiate statin therapy.  At this time he would like to focus on risk factor modification including diet and exercise    Follow-up and further recommendations pending test results.    35 minutes spent reviewing prior records (including documentation, laboratory studies, cardiac testing/imaging), interview with patient along with physical exam, planning, and subsequent documentation/crafting of note.       History of Present Illness    Once again I would like to thank you again for asking me to participate in the care of your patient, Jordan Miller.  As you know, but to reiterate for my own records, Jordan Miller is a 67 year old male with risk factors for development of coronary disease.  On interview, Jordan does report some intermittent chest discomfort.  Discomfort is somewhat of a moderate pain in  the right shoulder though can vary in location.  Is not necessarily provoked with exertion.  He has perhaps some mild associated shortness of breath.    Further review of systems is otherwise negative/noncontributory (medical record and 13 point review of systems reviewed as well and pertinent positives noted).       Cardiac Diagnostics     Treadmill exercise nuclear perfusion study 3 September 2019:  1. Myocardial perfusion imaging using single isotope technique demonstrated normal myocardial perfusion.   2. Gated images demonstrated normal wall motion.  The left ventricular systolic function is normal with a calculated ejection fraction of 62%.    Twelve-lead ECG 23 April 2021: Normal sinus rhythm.  Normal ECG.         Physical Examination       /50 (BP Location: Left arm, Patient Position: Sitting, Cuff Size: Adult Regular)   Pulse 65   Resp 16   Wt 85.3 kg (188 lb)   SpO2 100%   BMI 29.44 kg/m          Wt Readings from Last 3 Encounters:   11/28/22 85.3 kg (188 lb)   06/08/22 82.2 kg (181 lb 3 oz)   05/12/22 83.6 kg (184 lb 4 oz)     The patient is alert and oriented times three. Sclerae are anicteric. Mucosal membranes are moist. Jugular venous pressure is normal. No significant adenopathy/thyromegally appreciated. Lungs are clear with good expansion. On cardiovascular exam, the patient has a regular S1 and S2. Abdomen is soft and non-tender. Extremities reveal no clubbing, cyanosis, or edema.       Imaging     MRI of brain 29 June 2021:  1. No evidence of acute infarction or other acute intracranial abnormality.   2. Mild generalized atrophy without convincing evidence of a dementia-specific volume loss pattern.          Family History/Social History/Risk Factors   Patient does not smoke. Jordan reports that his younger sister has history of PCI with stent placement.       Medical History  Surgical History Family History Social History   Past Medical History:   Diagnosis Date     BPH (benign  prostatic hyperplasia)      Hyperlipidemia      Hypertension      Left ankle pain      Type 2 diabetes mellitus (H)      Past Surgical History:   Procedure Laterality Date     CHOLECYSTECTOMY      1994     HERNIA REPAIR, UMBILICAL      ?2013     No family history on file.     Social History     Socioeconomic History     Marital status:      Spouse name: Not on file     Number of children: Not on file     Years of education: Not on file     Highest education level: Not on file   Occupational History     Not on file   Tobacco Use     Smoking status: Never     Smokeless tobacco: Never   Substance and Sexual Activity     Alcohol use: Yes     Comment: 2-3 drinks a week hard liquor     Drug use: Never     Sexual activity: Not on file   Other Topics Concern     Not on file   Social History Narrative     Not on file     Social Determinants of Health     Financial Resource Strain: Not on file   Food Insecurity: Not on file   Transportation Needs: Not on file   Physical Activity: Not on file   Stress: Not on file   Social Connections: Not on file   Intimate Partner Violence: Not on file   Housing Stability: Not on file           Medications  Allergies   Current Outpatient Medications   Medication Sig Dispense Refill     ACCU-CHEK GUIDE test strip TEST TWICE DAILY 200 strip 0     alcohol swab prep pads Use to swab area of injection/loraine as directed. 100 each 3     Alcohol Swabs (ALCOHOL WIPES) 70 % PADS USE AS DIRECTED TO TEST BLOOD SUGARS 100 each 3     blood glucose (NO BRAND SPECIFIED) test strip 1 each by Other route       blood glucose (ONETOUCH VERIO IQ) test strip Use to test blood sugar1  times daily. 100 strip 6     blood glucose calibration (ONETOUCH VERIO) solution Use to calibrate blood glucose monitor as directed. 1 each 1     blood glucose monitoring (ONETOUCH VERIO IQ SYSTEM) meter device kit Use to test blood sugar 1 times daily. 1 kit 0     ciclopirox (PENLAC) 8 % external solution Apply to adjacent  skin and affected nails daily.  Remove with alcohol every 7 days, then repeat. Apply for 48 weeks. 6.6 mL 4     cyanocobalamin (VITAMIN B-12) 1000 MCG tablet Take 1 tablet (1,000 mcg) by mouth every other day 90 tablet 2     fluocinonide (LIDEX) 0.05 % external solution Apply to affected area on scalp BID x 2-4 weeks, tapering with improvement. Do not apply to face or body folds. 60 mL 6     fluorouracil (EFUDEX) 5 % external cream Apply to warts on right 3rd finger at night. Cover with duct tape and remove in the morning. 40 g 3     glimepiride (AMARYL) 1 MG tablet Take 1 tablet (1 mg) by mouth every morning (before breakfast) 90 tablet 0     ketoconazole (NIZORAL) 2 % external cream Apply topically 2 times daily To left foot for 4-6 weeks. 60 g 3     ketoconazole (NIZORAL) 2 % external shampoo Wet affected area daily, apply shampoo and lather, let sit for 3-5 minutes and then rinse. 120 mL 11     lisinopril (ZESTRIL) 5 MG tablet Take 1 tablet (5 mg) by mouth daily (Patient taking differently: Take 10 mg by mouth daily) 90 tablet 3     metFORMIN (GLUCOPHAGE) 500 MG tablet Take 1 tablet (500 mg) by mouth 2 times daily (with meals) 180 tablet 3     RESTASIS 0.05 % ophthalmic emulsion        salicylic acid (COMPOUND W MAX STRENGTH) 17 % external gel Apply to warts on right 3rd finger at night ( mix with efudex) and cover with duct tape. Remove in the morning and restart every evening. 14 g 4     tamsulosin (FLOMAX) 0.4 MG capsule Take 1 capsule (0.4 mg) by mouth 2 times daily 180 capsule 2     urea (GORDONS UREA) 40 % external ointment Apply 1-2x daily as needed 30 g 4     urea (GORDONS UREA) 40 % external ointment Apply to yellow thick areas on feet 1-2x daily 30 g 3     urea (GORMEL) 20 % external cream Apply topically as needed (for thick skin) 480 g 4     vitamin D3 (CHOLECALCIFEROL) 50 mcg (2000 units) tablet Take 2 tablets (100 mcg) by mouth daily 180 tablet 2     betamethasone valerate (VALISONE) 0.1 % external  lotion Apply topically 2 times daily To face when flared. (Patient not taking: Reported on 11/21/2022) 60 mL 1     cholecalciferol (VITAMIN D3) 125 mcg (5000 units) capsule  (Patient not taking: Reported on 11/28/2022)       Omega-3 Fatty Acids (FISH OIL BURP-LESS) 1000 MG CAPS Take 1 capsule by mouth 2 times daily (Patient not taking: Reported on 11/28/2022) 180 capsule 0     rosuvastatin (CRESTOR) 10 MG tablet Take 1 tablet (10 mg) by mouth daily (Patient not taking: Reported on 11/28/2022) 90 tablet 3       Allergies   Allergen Reactions     No Known Allergies           Lab Results    Chemistry/lipid CBC Cardiac Enzymes/BNP/TSH/INR   Recent Labs   Lab Test 01/04/22  0947   CHOL 201*   HDL 71      TRIG 70     Recent Labs   Lab Test 01/04/22  0947 08/31/21  0747 04/23/21  0957    113 106*     Recent Labs   Lab Test 01/04/22  0947      POTASSIUM 4.3   CHLORIDE 106   CO2 25      BUN 13   CR 0.97   GFRESTIMATED 87   BRENDAN 9.4     Recent Labs   Lab Test 01/04/22  0947 08/31/21  0747 04/23/21  0957   CR 0.97 0.96 0.92     Recent Labs   Lab Test 05/20/22  0811 01/04/22  0947 08/31/21  0747   A1C 5.7* 6.6* 6.2*          Recent Labs   Lab Test 08/31/21  0747   WBC 6.7   HGB 13.4   HCT 40.8   MCV 86        Recent Labs   Lab Test 08/31/21  0747 04/23/21  0957 07/03/19  0938   HGB 13.4 13.3 13.4    No results for input(s): TROPONINI in the last 39160 hours.  No results for input(s): BNP, NTBNPI, NTBNP in the last 45641 hours.  Recent Labs   Lab Test 06/11/21  1116   TSH 0.77     No results for input(s): INR in the last 56157 hours.       Medications  Allergies   Current Outpatient Medications   Medication Sig Dispense Refill     ACCU-CHEK GUIDE test strip TEST TWICE DAILY 200 strip 0     alcohol swab prep pads Use to swab area of injection/loraine as directed. 100 each 3     Alcohol Swabs (ALCOHOL WIPES) 70 % PADS USE AS DIRECTED TO TEST BLOOD SUGARS 100 each 3     blood glucose (NO BRAND  SPECIFIED) test strip 1 each by Other route       blood glucose (ONETOUCH VERIO IQ) test strip Use to test blood sugar1  times daily. 100 strip 6     blood glucose calibration (ONETOUCH VERIO) solution Use to calibrate blood glucose monitor as directed. 1 each 1     blood glucose monitoring (ONETOUCH VERIO IQ SYSTEM) meter device kit Use to test blood sugar 1 times daily. 1 kit 0     ciclopirox (PENLAC) 8 % external solution Apply to adjacent skin and affected nails daily.  Remove with alcohol every 7 days, then repeat. Apply for 48 weeks. 6.6 mL 4     cyanocobalamin (VITAMIN B-12) 1000 MCG tablet Take 1 tablet (1,000 mcg) by mouth every other day 90 tablet 2     fluocinonide (LIDEX) 0.05 % external solution Apply to affected area on scalp BID x 2-4 weeks, tapering with improvement. Do not apply to face or body folds. 60 mL 6     fluorouracil (EFUDEX) 5 % external cream Apply to warts on right 3rd finger at night. Cover with duct tape and remove in the morning. 40 g 3     glimepiride (AMARYL) 1 MG tablet Take 1 tablet (1 mg) by mouth every morning (before breakfast) 90 tablet 0     ketoconazole (NIZORAL) 2 % external cream Apply topically 2 times daily To left foot for 4-6 weeks. 60 g 3     ketoconazole (NIZORAL) 2 % external shampoo Wet affected area daily, apply shampoo and lather, let sit for 3-5 minutes and then rinse. 120 mL 11     lisinopril (ZESTRIL) 5 MG tablet Take 1 tablet (5 mg) by mouth daily (Patient taking differently: Take 10 mg by mouth daily) 90 tablet 3     metFORMIN (GLUCOPHAGE) 500 MG tablet Take 1 tablet (500 mg) by mouth 2 times daily (with meals) 180 tablet 3     RESTASIS 0.05 % ophthalmic emulsion        salicylic acid (COMPOUND W MAX STRENGTH) 17 % external gel Apply to warts on right 3rd finger at night ( mix with efudex) and cover with duct tape. Remove in the morning and restart every evening. 14 g 4     tamsulosin (FLOMAX) 0.4 MG capsule Take 1 capsule (0.4 mg) by mouth 2 times daily 180  capsule 2     urea (GORDONS UREA) 40 % external ointment Apply 1-2x daily as needed 30 g 4     urea (GORDONS UREA) 40 % external ointment Apply to yellow thick areas on feet 1-2x daily 30 g 3     urea (GORMEL) 20 % external cream Apply topically as needed (for thick skin) 480 g 4     vitamin D3 (CHOLECALCIFEROL) 50 mcg (2000 units) tablet Take 2 tablets (100 mcg) by mouth daily 180 tablet 2     betamethasone valerate (VALISONE) 0.1 % external lotion Apply topically 2 times daily To face when flared. (Patient not taking: Reported on 11/21/2022) 60 mL 1     cholecalciferol (VITAMIN D3) 125 mcg (5000 units) capsule  (Patient not taking: Reported on 11/28/2022)       Omega-3 Fatty Acids (FISH OIL BURP-LESS) 1000 MG CAPS Take 1 capsule by mouth 2 times daily (Patient not taking: Reported on 11/28/2022) 180 capsule 0     rosuvastatin (CRESTOR) 10 MG tablet Take 1 tablet (10 mg) by mouth daily (Patient not taking: Reported on 11/28/2022) 90 tablet 3      Allergies   Allergen Reactions     No Known Allergies         Lab Results   Lab Results   Component Value Date     01/04/2022     04/23/2021    CO2 25 01/04/2022    CO2 25 04/23/2021    BUN 13 01/04/2022    BUN 20 04/23/2021     Lab Results   Component Value Date    WBC 6.7 08/31/2021    WBC 6.1 04/23/2021    HGB 13.4 08/31/2021    HGB 13.3 04/23/2021    HCT 40.8 08/31/2021    HCT 38.9 04/23/2021    MCV 86 08/31/2021    MCV 87 04/23/2021     08/31/2021     04/23/2021     Lab Results   Component Value Date    CHOL 201 01/04/2022    CHOL 188 04/23/2021    TRIG 70 01/04/2022    TRIG 56 04/23/2021    HDL 71 01/04/2022    HDL 71 04/23/2021     Lab Results   Component Value Date    TSH 0.77 06/11/2021         Medical History  Surgical History   Past Medical History:   Diagnosis Date     BPH (benign prostatic hyperplasia)      Hyperlipidemia      Hypertension      Left ankle pain      Type 2 diabetes mellitus (H)       Past Surgical History:   Procedure  Laterality Date     CHOLECYSTECTOMY      1994     HERNIA REPAIR, UMBILICAL      ?2013

## 2022-11-28 NOTE — LETTER
11/28/2022    Mira Han MD  [address]  [facility] [address]    RE: Jordan Miller       Dear Colleague,     I had the pleasure of seeing Jordan Miller in the [facility].         [facility]   [address], [place] [address]   [email or web address]   OFFICE: [phone]          Thank you Dr. Ku ref. provider found for asking the [facility] team to participate in the care of your patient, Jordan Miller.     Impression and Plan     1.  Chest discomfort.  Chest discomfort symptoms are a bit atypical for cardiac etiology, ischemic or otherwise.  Nonetheless, he does have a variety of risk factors for development of coronary disease including diabetes mellitus, hypertension, dyslipidemia, and early onset coronary disease in his family.  Cannot fully discount possible ischemic contribution to his symptoms.  Plan:    Stress echocardiogram (of note, he was able to exercise for a little over 11 minutes and achieved 13 METS on his stress test a few years ago).    2.  Hypertension.  Blood pressure is reasonable in the office today at 128/50 mmHg.    3.  Dyslipidemia.  Lipid profile for January 2022 revealed  mg/dL and HDL 71 mg/dL.  Ideally would like to suppress LDL less than 70 mg/dL if possible. Jordan indicates that he is reticent to initiate statin therapy.  At this time he would like to focus on risk factor modification including diet and exercise    Follow-up and further recommendations pending test results.    35 minutes spent reviewing prior records (including documentation, laboratory studies, cardiac testing/imaging), interview with patient along with physical exam, planning, and subsequent documentation/crafting of note.       History of Present Illness    Once again I would like to thank you again for asking me to participate in the care of your patient, Jordan Miller.  As you know, but to reiterate for my own records, Jordan Miller is  a 67 year old male with risk factors for development of coronary disease.  On interview, Jordan does report some intermittent chest discomfort.  Discomfort is somewhat of a moderate pain in the right shoulder though can vary in location.  Is not necessarily provoked with exertion.  He has perhaps some mild associated shortness of breath.    Further review of systems is otherwise negative/noncontributory (medical record and 13 point review of systems reviewed as well and pertinent positives noted).       Cardiac Diagnostics     Treadmill exercise nuclear perfusion study 3 September 2019:  1. Myocardial perfusion imaging using single isotope technique demonstrated normal myocardial perfusion.   2. Gated images demonstrated normal wall motion.  The left ventricular systolic function is normal with a calculated ejection fraction of 62%.    Twelve-lead ECG 23 April 2021: Normal sinus rhythm.  Normal ECG.         Physical Examination       /50 (BP Location: Left arm, Patient Position: Sitting, Cuff Size: Adult Regular)   Pulse 65   Resp 16   Wt 85.3 kg (188 lb)   SpO2 100%   BMI 29.44 kg/m          Wt Readings from Last 3 Encounters:   11/28/22 85.3 kg (188 lb)   06/08/22 82.2 kg (181 lb 3 oz)   05/12/22 83.6 kg (184 lb 4 oz)     The patient is alert and oriented times three. Sclerae are anicteric. Mucosal membranes are moist. Jugular venous pressure is normal. No significant adenopathy/thyromegally appreciated. Lungs are clear with good expansion. On cardiovascular exam, the patient has a regular S1 and S2. Abdomen is soft and non-tender. Extremities reveal no clubbing, cyanosis, or edema.       Imaging     MRI of brain 29 June 2021:  1. No evidence of acute infarction or other acute intracranial abnormality.   2. Mild generalized atrophy without convincing evidence of a dementia-specific volume loss pattern.          Family History/Social History/Risk Factors   Patient does not smoke. Jordan reports that his  younger sister has history of PCI with stent placement.       Medical History  Surgical History Family History Social History   Past Medical History:   Diagnosis Date     BPH (benign prostatic hyperplasia)      Hyperlipidemia      Hypertension      Left ankle pain      Type 2 diabetes mellitus (H)      Past Surgical History:   Procedure Laterality Date     CHOLECYSTECTOMY      1994     HERNIA REPAIR, UMBILICAL      ?2013     No family history on file.     Social History     Socioeconomic History     Marital status:      Spouse name: Not on file     Number of children: Not on file     Years of education: Not on file     Highest education level: Not on file   Occupational History     Not on file   Tobacco Use     Smoking status: Never     Smokeless tobacco: Never   Substance and Sexual Activity     Alcohol use: Yes     Comment: 2-3 drinks a week hard liquor     Drug use: Never     Sexual activity: Not on file   Other Topics Concern     Not on file   Social History Narrative     Not on file     Social Determinants of Health     Financial Resource Strain: Not on file   Food Insecurity: Not on file   Transportation Needs: Not on file   Physical Activity: Not on file   Stress: Not on file   Social Connections: Not on file   Intimate Partner Violence: Not on file   Housing Stability: Not on file           Medications  Allergies   Current Outpatient Medications   Medication Sig Dispense Refill     ACCU-CHEK GUIDE test strip TEST TWICE DAILY 200 strip 0     alcohol swab prep pads Use to swab area of injection/loraine as directed. 100 each 3     Alcohol Swabs (ALCOHOL WIPES) 70 % PADS USE AS DIRECTED TO TEST BLOOD SUGARS 100 each 3     blood glucose (NO BRAND SPECIFIED) test strip 1 each by Other route       blood glucose (ONETOUCH VERIO IQ) test strip Use to test blood sugar1  times daily. 100 strip 6     blood glucose calibration (ONETOUCH VERIO) solution Use to calibrate blood glucose monitor as directed. 1 each 1      blood glucose monitoring (ONETOUCH VERIO IQ SYSTEM) meter device kit Use to test blood sugar 1 times daily. 1 kit 0     ciclopirox (PENLAC) 8 % external solution Apply to adjacent skin and affected nails daily.  Remove with alcohol every 7 days, then repeat. Apply for 48 weeks. 6.6 mL 4     cyanocobalamin (VITAMIN B-12) 1000 MCG tablet Take 1 tablet (1,000 mcg) by mouth every other day 90 tablet 2     fluocinonide (LIDEX) 0.05 % external solution Apply to affected area on scalp BID x 2-4 weeks, tapering with improvement. Do not apply to face or body folds. 60 mL 6     fluorouracil (EFUDEX) 5 % external cream Apply to warts on right 3rd finger at night. Cover with duct tape and remove in the morning. 40 g 3     glimepiride (AMARYL) 1 MG tablet Take 1 tablet (1 mg) by mouth every morning (before breakfast) 90 tablet 0     ketoconazole (NIZORAL) 2 % external cream Apply topically 2 times daily To left foot for 4-6 weeks. 60 g 3     ketoconazole (NIZORAL) 2 % external shampoo Wet affected area daily, apply shampoo and lather, let sit for 3-5 minutes and then rinse. 120 mL 11     lisinopril (ZESTRIL) 5 MG tablet Take 1 tablet (5 mg) by mouth daily (Patient taking differently: Take 10 mg by mouth daily) 90 tablet 3     metFORMIN (GLUCOPHAGE) 500 MG tablet Take 1 tablet (500 mg) by mouth 2 times daily (with meals) 180 tablet 3     RESTASIS 0.05 % ophthalmic emulsion        salicylic acid (COMPOUND W MAX STRENGTH) 17 % external gel Apply to warts on right 3rd finger at night ( mix with efudex) and cover with duct tape. Remove in the morning and restart every evening. 14 g 4     tamsulosin (FLOMAX) 0.4 MG capsule Take 1 capsule (0.4 mg) by mouth 2 times daily 180 capsule 2     urea (GORDONS UREA) 40 % external ointment Apply 1-2x daily as needed 30 g 4     urea (GORDONS UREA) 40 % external ointment Apply to yellow thick areas on feet 1-2x daily 30 g 3     urea (GORMEL) 20 % external cream Apply topically as needed (for thick  skin) 480 g 4     vitamin D3 (CHOLECALCIFEROL) 50 mcg (2000 units) tablet Take 2 tablets (100 mcg) by mouth daily 180 tablet 2     betamethasone valerate (VALISONE) 0.1 % external lotion Apply topically 2 times daily To face when flared. (Patient not taking: Reported on 11/21/2022) 60 mL 1     cholecalciferol (VITAMIN D3) 125 mcg (5000 units) capsule  (Patient not taking: Reported on 11/28/2022)       Omega-3 Fatty Acids (FISH OIL BURP-LESS) 1000 MG CAPS Take 1 capsule by mouth 2 times daily (Patient not taking: Reported on 11/28/2022) 180 capsule 0     rosuvastatin (CRESTOR) 10 MG tablet Take 1 tablet (10 mg) by mouth daily (Patient not taking: Reported on 11/28/2022) 90 tablet 3       Allergies   Allergen Reactions     No Known Allergies           Lab Results    Chemistry/lipid CBC Cardiac Enzymes/BNP/TSH/INR   Recent Labs   Lab Test 01/04/22  0947   CHOL 201*   HDL 71      TRIG 70     Recent Labs   Lab Test 01/04/22  0947 08/31/21  0747 04/23/21  0957    113 106*     Recent Labs   Lab Test 01/04/22  0947      POTASSIUM 4.3   CHLORIDE 106   CO2 25      BUN 13   CR 0.97   GFRESTIMATED 87   BRENDAN 9.4     Recent Labs   Lab Test 01/04/22  0947 08/31/21  0747 04/23/21  0957   CR 0.97 0.96 0.92     Recent Labs   Lab Test 05/20/22  0811 01/04/22  0947 08/31/21  0747   A1C 5.7* 6.6* 6.2*          Recent Labs   Lab Test 08/31/21  0747   WBC 6.7   HGB 13.4   HCT 40.8   MCV 86        Recent Labs   Lab Test 08/31/21  0747 04/23/21  0957 07/03/19  0938   HGB 13.4 13.3 13.4    No results for input(s): TROPONINI in the last 10630 hours.  No results for input(s): BNP, NTBNPI, NTBNP in the last 32206 hours.  Recent Labs   Lab Test 06/11/21  1116   TSH 0.77     No results for input(s): INR in the last 86339 hours.       Medications  Allergies   Current Outpatient Medications   Medication Sig Dispense Refill     ACCU-CHEK GUIDE test strip TEST TWICE DAILY 200 strip 0     alcohol swab prep pads Use to  swab area of injection/loraine as directed. 100 each 3     Alcohol Swabs (ALCOHOL WIPES) 70 % PADS USE AS DIRECTED TO TEST BLOOD SUGARS 100 each 3     blood glucose (NO BRAND SPECIFIED) test strip 1 each by Other route       blood glucose (ONETOUCH VERIO IQ) test strip Use to test blood sugar1  times daily. 100 strip 6     blood glucose calibration (ONETOUCH VERIO) solution Use to calibrate blood glucose monitor as directed. 1 each 1     blood glucose monitoring (ONETOUCH VERIO IQ SYSTEM) meter device kit Use to test blood sugar 1 times daily. 1 kit 0     ciclopirox (PENLAC) 8 % external solution Apply to adjacent skin and affected nails daily.  Remove with alcohol every 7 days, then repeat. Apply for 48 weeks. 6.6 mL 4     cyanocobalamin (VITAMIN B-12) 1000 MCG tablet Take 1 tablet (1,000 mcg) by mouth every other day 90 tablet 2     fluocinonide (LIDEX) 0.05 % external solution Apply to affected area on scalp BID x 2-4 weeks, tapering with improvement. Do not apply to face or body folds. 60 mL 6     fluorouracil (EFUDEX) 5 % external cream Apply to warts on right 3rd finger at night. Cover with duct tape and remove in the morning. 40 g 3     glimepiride (AMARYL) 1 MG tablet Take 1 tablet (1 mg) by mouth every morning (before breakfast) 90 tablet 0     ketoconazole (NIZORAL) 2 % external cream Apply topically 2 times daily To left foot for 4-6 weeks. 60 g 3     ketoconazole (NIZORAL) 2 % external shampoo Wet affected area daily, apply shampoo and lather, let sit for 3-5 minutes and then rinse. 120 mL 11     lisinopril (ZESTRIL) 5 MG tablet Take 1 tablet (5 mg) by mouth daily (Patient taking differently: Take 10 mg by mouth daily) 90 tablet 3     metFORMIN (GLUCOPHAGE) 500 MG tablet Take 1 tablet (500 mg) by mouth 2 times daily (with meals) 180 tablet 3     RESTASIS 0.05 % ophthalmic emulsion        salicylic acid (COMPOUND W MAX STRENGTH) 17 % external gel Apply to warts on right 3rd finger at night ( mix with  efudex) and cover with duct tape. Remove in the morning and restart every evening. 14 g 4     tamsulosin (FLOMAX) 0.4 MG capsule Take 1 capsule (0.4 mg) by mouth 2 times daily 180 capsule 2     urea (GORDONS UREA) 40 % external ointment Apply 1-2x daily as needed 30 g 4     urea (GORDONS UREA) 40 % external ointment Apply to yellow thick areas on feet 1-2x daily 30 g 3     urea (GORMEL) 20 % external cream Apply topically as needed (for thick skin) 480 g 4     vitamin D3 (CHOLECALCIFEROL) 50 mcg (2000 units) tablet Take 2 tablets (100 mcg) by mouth daily 180 tablet 2     betamethasone valerate (VALISONE) 0.1 % external lotion Apply topically 2 times daily To face when flared. (Patient not taking: Reported on 11/21/2022) 60 mL 1     cholecalciferol (VITAMIN D3) 125 mcg (5000 units) capsule  (Patient not taking: Reported on 11/28/2022)       Omega-3 Fatty Acids (FISH OIL BURP-LESS) 1000 MG CAPS Take 1 capsule by mouth 2 times daily (Patient not taking: Reported on 11/28/2022) 180 capsule 0     rosuvastatin (CRESTOR) 10 MG tablet Take 1 tablet (10 mg) by mouth daily (Patient not taking: Reported on 11/28/2022) 90 tablet 3      Allergies   Allergen Reactions     No Known Allergies         Lab Results   Lab Results   Component Value Date     01/04/2022     04/23/2021    CO2 25 01/04/2022    CO2 25 04/23/2021    BUN 13 01/04/2022    BUN 20 04/23/2021     Lab Results   Component Value Date    WBC 6.7 08/31/2021    WBC 6.1 04/23/2021    HGB 13.4 08/31/2021    HGB 13.3 04/23/2021    HCT 40.8 08/31/2021    HCT 38.9 04/23/2021    MCV 86 08/31/2021    MCV 87 04/23/2021     08/31/2021     04/23/2021     Lab Results   Component Value Date    CHOL 201 01/04/2022    CHOL 188 04/23/2021    TRIG 70 01/04/2022    TRIG 56 04/23/2021    HDL 71 01/04/2022    HDL 71 04/23/2021     Lab Results   Component Value Date    TSH 0.77 06/11/2021         Medical History  Surgical History   Past Medical History:    Diagnosis Date     BPH (benign prostatic hyperplasia)      Hyperlipidemia      Hypertension      Left ankle pain      Type 2 diabetes mellitus (H)       Past Surgical History:   Procedure Laterality Date     CHOLECYSTECTOMY      1994     HERNIA REPAIR, UMBILICAL      ?2013             Thank you for allowing me to participate in the care of your patient.      Sincerely,     Johnnie Chavarria MD     [facility]  cc:   No referring provider defined for this encounter.

## 2022-11-30 ENCOUNTER — HOSPITAL ENCOUNTER (OUTPATIENT)
Dept: CARDIOLOGY | Facility: CLINIC | Age: 67
Discharge: HOME | End: 2022-11-30
Attending: INTERNAL MEDICINE | Admitting: INTERNAL MEDICINE
Payer: COMMERCIAL

## 2022-11-30 DIAGNOSIS — R07.2 PRECORDIAL PAIN: ICD-10-CM

## 2022-11-30 PROCEDURE — 93321 DOPPLER ECHO F-UP/LMTD STD: CPT | Mod: 26 | Performed by: INTERNAL MEDICINE

## 2022-11-30 PROCEDURE — 93350 STRESS TTE ONLY: CPT | Mod: TC

## 2022-11-30 PROCEDURE — 93325 DOPPLER ECHO COLOR FLOW MAPG: CPT | Mod: TC

## 2022-11-30 PROCEDURE — 93016 CV STRESS TEST SUPVJ ONLY: CPT | Performed by: INTERNAL MEDICINE

## 2022-11-30 PROCEDURE — 93350 STRESS TTE ONLY: CPT | Mod: 26 | Performed by: INTERNAL MEDICINE

## 2022-11-30 PROCEDURE — 93325 DOPPLER ECHO COLOR FLOW MAPG: CPT | Mod: 26 | Performed by: INTERNAL MEDICINE

## 2022-11-30 PROCEDURE — 93018 CV STRESS TEST I&R ONLY: CPT | Performed by: INTERNAL MEDICINE

## 2022-11-30 PROCEDURE — 93017 CV STRESS TEST TRACING ONLY: CPT

## 2022-12-13 ENCOUNTER — OFFICE VISIT (OUTPATIENT)
Dept: FAMILY MEDICINE | Facility: CLINIC | Age: 67
End: 2022-12-13
Payer: COMMERCIAL

## 2022-12-13 VITALS
OXYGEN SATURATION: 99 % | HEART RATE: 60 BPM | BODY MASS INDEX: 29.67 KG/M2 | SYSTOLIC BLOOD PRESSURE: 122 MMHG | HEIGHT: 67 IN | WEIGHT: 189.06 LBS | DIASTOLIC BLOOD PRESSURE: 64 MMHG

## 2022-12-13 DIAGNOSIS — Z00.00 ENCOUNTER FOR MEDICARE ANNUAL WELLNESS EXAM: Primary | ICD-10-CM

## 2022-12-13 DIAGNOSIS — N13.8 BENIGN PROSTATIC HYPERPLASIA WITH URINARY OBSTRUCTION: ICD-10-CM

## 2022-12-13 DIAGNOSIS — I10 PRIMARY HYPERTENSION: ICD-10-CM

## 2022-12-13 DIAGNOSIS — E53.8 VITAMIN B 12 DEFICIENCY: ICD-10-CM

## 2022-12-13 DIAGNOSIS — E11.9 TYPE 2 DIABETES MELLITUS WITHOUT COMPLICATION, WITHOUT LONG-TERM CURRENT USE OF INSULIN (H): ICD-10-CM

## 2022-12-13 DIAGNOSIS — N40.1 BENIGN PROSTATIC HYPERPLASIA WITH URINARY OBSTRUCTION: ICD-10-CM

## 2022-12-13 DIAGNOSIS — L84 CALLUS: ICD-10-CM

## 2022-12-13 DIAGNOSIS — E55.9 VITAMIN D DEFICIENCY: ICD-10-CM

## 2022-12-13 DIAGNOSIS — Z13.220 SCREENING FOR HYPERLIPIDEMIA: ICD-10-CM

## 2022-12-13 DIAGNOSIS — M17.0 PRIMARY OSTEOARTHRITIS OF BOTH KNEES: ICD-10-CM

## 2022-12-13 LAB
ANION GAP SERPL CALCULATED.3IONS-SCNC: 14 MMOL/L (ref 7–15)
BUN SERPL-MCNC: 14.8 MG/DL (ref 8–23)
CALCIUM SERPL-MCNC: 9.1 MG/DL (ref 8.8–10.2)
CHLORIDE SERPL-SCNC: 101 MMOL/L (ref 98–107)
CHOLEST SERPL-MCNC: 177 MG/DL
CREAT SERPL-MCNC: 0.92 MG/DL (ref 0.67–1.17)
DEPRECATED CALCIDIOL+CALCIFEROL SERPL-MC: 58 UG/L (ref 20–75)
DEPRECATED HCO3 PLAS-SCNC: 20 MMOL/L (ref 22–29)
GFR SERPL CREATININE-BSD FRML MDRD: >90 ML/MIN/1.73M2
GLUCOSE SERPL-MCNC: 103 MG/DL (ref 70–99)
HBA1C MFR BLD: 6.5 % (ref 0–5.6)
HDLC SERPL-MCNC: 62 MG/DL
LDLC SERPL CALC-MCNC: 100 MG/DL
NONHDLC SERPL-MCNC: 115 MG/DL
POTASSIUM SERPL-SCNC: 4.1 MMOL/L (ref 3.4–5.3)
SODIUM SERPL-SCNC: 135 MMOL/L (ref 136–145)
TRIGL SERPL-MCNC: 74 MG/DL
VIT B12 SERPL-MCNC: 861 PG/ML (ref 232–1245)

## 2022-12-13 PROCEDURE — 82043 UR ALBUMIN QUANTITATIVE: CPT | Performed by: FAMILY MEDICINE

## 2022-12-13 PROCEDURE — G0008 ADMIN INFLUENZA VIRUS VAC: HCPCS | Mod: 59 | Performed by: FAMILY MEDICINE

## 2022-12-13 PROCEDURE — 36415 COLL VENOUS BLD VENIPUNCTURE: CPT | Performed by: FAMILY MEDICINE

## 2022-12-13 PROCEDURE — 90662 IIV NO PRSV INCREASED AG IM: CPT | Performed by: FAMILY MEDICINE

## 2022-12-13 PROCEDURE — 90714 TD VACC NO PRESV 7 YRS+ IM: CPT | Performed by: FAMILY MEDICINE

## 2022-12-13 PROCEDURE — G0439 PPPS, SUBSEQ VISIT: HCPCS | Performed by: FAMILY MEDICINE

## 2022-12-13 PROCEDURE — 82607 VITAMIN B-12: CPT | Performed by: FAMILY MEDICINE

## 2022-12-13 PROCEDURE — 90472 IMMUNIZATION ADMIN EACH ADD: CPT | Performed by: FAMILY MEDICINE

## 2022-12-13 PROCEDURE — 83036 HEMOGLOBIN GLYCOSYLATED A1C: CPT | Performed by: FAMILY MEDICINE

## 2022-12-13 PROCEDURE — 99214 OFFICE O/P EST MOD 30 MIN: CPT | Mod: 25 | Performed by: FAMILY MEDICINE

## 2022-12-13 PROCEDURE — 80061 LIPID PANEL: CPT | Performed by: FAMILY MEDICINE

## 2022-12-13 PROCEDURE — 82306 VITAMIN D 25 HYDROXY: CPT | Performed by: FAMILY MEDICINE

## 2022-12-13 PROCEDURE — 80048 BASIC METABOLIC PNL TOTAL CA: CPT | Performed by: FAMILY MEDICINE

## 2022-12-13 RX ORDER — SENNOSIDES 8.6 MG
650 CAPSULE ORAL EVERY 8 HOURS PRN
Qty: 90 TABLET | Refills: 3 | Status: SHIPPED | OUTPATIENT
Start: 2022-12-13

## 2022-12-13 RX ORDER — PREDNISOLONE ACETATE 10 MG/ML
SUSPENSION/ DROPS OPHTHALMIC
COMMUNITY
Start: 2022-12-02 | End: 2024-06-06

## 2022-12-13 RX ORDER — TAMSULOSIN HYDROCHLORIDE 0.4 MG/1
0.4 CAPSULE ORAL DAILY
Qty: 90 CAPSULE | Refills: 4 | Status: SHIPPED | OUTPATIENT
Start: 2022-12-13 | End: 2023-11-28

## 2022-12-13 RX ORDER — GATIFLOXACIN 5 MG/ML
SOLUTION/ DROPS OPHTHALMIC
COMMUNITY
Start: 2022-12-02 | End: 2024-06-06

## 2022-12-13 RX ORDER — LISINOPRIL 10 MG/1
10 TABLET ORAL DAILY
Qty: 90 TABLET | Refills: 4 | Status: SHIPPED | OUTPATIENT
Start: 2022-12-13 | End: 2023-05-15

## 2022-12-13 RX ORDER — KETOROLAC TROMETHAMINE 5 MG/ML
SOLUTION OPHTHALMIC
COMMUNITY
Start: 2022-12-02 | End: 2024-06-06

## 2022-12-13 ASSESSMENT — ENCOUNTER SYMPTOMS
JOINT SWELLING: 1
HEARTBURN: 0
CONSTIPATION: 0
PARESTHESIAS: 0
CHILLS: 0
COUGH: 0
WEAKNESS: 0
HEMATOCHEZIA: 0
NERVOUS/ANXIOUS: 0
PALPITATIONS: 0
HEADACHES: 0
DYSURIA: 0
SHORTNESS OF BREATH: 0
NAUSEA: 0
ARTHRALGIAS: 1
ABDOMINAL PAIN: 0
DIZZINESS: 0
FREQUENCY: 1
SORE THROAT: 0
HEMATURIA: 0
FEVER: 0
EYE PAIN: 0
MYALGIAS: 1
DIARRHEA: 0

## 2022-12-13 ASSESSMENT — ACTIVITIES OF DAILY LIVING (ADL): CURRENT_FUNCTION: NO ASSISTANCE NEEDED

## 2022-12-13 NOTE — PATIENT INSTRUCTIONS
Patient Education   Personalized Prevention Plan  You are due for the preventive services outlined below.  Your care team is available to assist you in scheduling these services.  If you have already completed any of these items, please share that information with your care team to update in your medical record.  Health Maintenance Due   Topic Date Due     Diptheria Tetanus Pertussis (DTAP/TDAP/TD) Vaccine (1 - Tdap) Never done     Zoster (Shingles) Vaccine (1 of 2) Never done     COVID-19 Vaccine (4 - Booster for Moderna series) 02/17/2022     Eye Exam  05/20/2022     A1C Lab  08/20/2022     Flu Vaccine (1) 09/01/2022     Basic Metabolic Panel  01/04/2023     Cholesterol Lab  01/04/2023     Kidney Microalbumin Urine Test  01/04/2023

## 2022-12-13 NOTE — PROGRESS NOTES
SUBJECTIVE:   Jordan Miller 67 year old  who presents for Preventive Visit.      Patient has been advised of split billing requirements and indicates understanding: Yes   Are you in the first 12 months of your Medicare coverage?  No    ASSESSMENT / PLAN:   Jordan was seen today for physical.    Diagnoses and all orders for this visit:    Encounter for Medicare annual wellness exam    Primary hypertension  Comments:  BP in good range on 10 mg dose   Orders:  -     lisinopril (ZESTRIL) 10 MG tablet; Take 1 tablet (10 mg) by mouth daily    Type 2 diabetes mellitus without complication, without long-term current use of insulin (H)  Comments:  taking metformin BID , A1c is well controlled , did increase since last visit while he was in [facility]   Lab Results   Component Value Date    A1C 6.5 12/13/2022    A1C 5.7 05/20/2022    A1C 6.6 01/04/2022    A1C 6.2 08/31/2021    A1C 6.4 06/11/2021    A1C 6.5 04/23/2021    A1C 6.1 07/03/2019    A1C 6.3 08/30/2018       Orders:  -     Adult Eye  Referral; Future  -     HEMOGLOBIN A1C; Future  -     Lipid panel reflex to direct LDL Non-fasting; Future  -     Albumin Random Urine Quantitative with Creat Ratio; Future  -     HEMOGLOBIN A1C  -     Lipid panel reflex to direct LDL Non-fasting  -     Albumin Random Urine Quantitative with Creat Ratio    Screening for hyperlipidemia  -     Lipid panel reflex to direct LDL Non-fasting; Future  -     Lipid panel reflex to direct LDL Non-fasting    Vitamin D deficiency  -     Vitamin D Deficiency; Future  -     Vitamin D Deficiency    Vitamin B 12 deficiency  -     Vitamin B12; Future  -     Vitamin B12    Callus  -     urea (GORDONS UREA) 40 % external ointment; Apply to yellow thick areas on feet 1-2x daily    Benign prostatic hyperplasia with urinary obstruction  -     tamsulosin (FLOMAX) 0.4 MG capsule; Take 1 capsule (0.4 mg) by mouth daily    Primary osteoarthritis of both knees  -     acetaminophen (TYLENOL) 650 MG CR tablet; Take  1 tablet (650 mg) by mouth every 8 hours as needed for mild pain or fever    Other orders  -     BASIC METABOLIC PANEL; Future  -     TD (ADULT, 7+) PRESERVE FREE  -     INFLUENZA, QUAD, HIGH DOSE, PF, 65YR + (FLUZONE HD); Future  -     ZOSTER VACCINE RECOMBINANT ADJUVANTED (SHINGRIX); Future  -     INFLUENZA VACCINE 65+ (FLUZONE HD)  -     BASIC METABOLIC PANEL          HPI  Wt Readings from Last 3 Encounters:   12/13/22 85.8 kg (189 lb 1 oz)   11/28/22 85.3 kg (188 lb)   06/08/22 82.2 kg (181 lb 3 oz)       PROBLEMS TO ADD ON...  Diabetes Follow-up    How often are you checking your blood sugar? Two times daily  Blood sugar testing frequency justification:  Adjustment of medication(s)  What time of day are you checking your blood sugars (select all that apply)?  Morning and night  Have you had any blood sugars above 200?  No  Have you had any blood sugars below 70?  No    What symptoms do you notice when your blood sugar is low?  None    What concerns do you have today about your diabetes? None     Do you have any of these symptoms? (Select all that apply)  No numbness or tingling in feet.  No redness, sores or blisters on feet.  No complaints of excessive thirst.  No reports of blurry vision.  No significant changes to weight.    Have you had a diabetic eye exam in the last 12 months? Yes       Hyperlipidemia Follow-Up      Are you regularly taking any medication or supplement to lower your cholesterol?   No    Are you having muscle aches or other side effects that you think could be caused by your cholesterol lowering medication?  No    Hypertension Follow-up      Do you check your blood pressure regularly outside of the clinic? Yes     Are you following a low salt diet? No    Are your blood pressures ever more than 140 on the top number (systolic) OR more   than 90 on the bottom number (diastolic), for example 140/90? Yes    BP Readings from Last 2 Encounters:   12/13/22 122/64   11/28/22 128/50     Hemoglobin  A1C (%)   Date Value   12/13/2022 6.5 (H)   05/20/2022 5.7 (H)   06/11/2021 6.4 (H)   04/23/2021 6.5 (H)     LDL Cholesterol Calculated (mg/dL)   Date Value   01/04/2022 116   08/31/2021 113   04/23/2021 106 (H)   07/03/2019 82       Do you feel safe in your environment? Yes    Have you ever done Advance Care Planning? (For example, a Health Directive, POLST, or a discussion with a medical provider or your loved ones about your wishes): No, advance care planning information given to patient to review.  Patient declined advance care planning discussion at this time.       Fall risk: no falls  Fallen 2 or more times in the past year?: No  Any fall with injury in the past year?: No  Cognitive Screening   1) Repeat 3 items (Leader, Season, Table)    2) Clock draw: NORMAL  3) 3 item recall: Recalls 2 objects   Results: NORMAL clock, 1-2 items recalled: COGNITIVE IMPAIRMENT LESS LIKELY    Mini-CogTM Copyright S Santiago. Licensed by the author for use in [facility]; reprinted with permission (soob@[facility]). All rights reserved.      Do you have sleep apnea, excessive snoring or daytime drowsiness?: no    Reviewed and updated as needed this visit by clinical staff   Tobacco  Allergies  Meds  Problems  Med Hx  Surg Hx  Fam Hx          Reviewed and updated as needed this visit by Provider   Tobacco  Allergies  Meds  Problems  Med Hx  Surg Hx  Fam Hx         Social History     Tobacco Use     Smoking status: Never     Smokeless tobacco: Never   Substance Use Topics     Alcohol use: Yes     Comment: 2-3 drinks a week hard liquor       Alcohol Use 12/13/2022   Prescreen: >3 drinks/day or >7 drinks/week? No   Prescreen: >3 drinks/day or >7 drinks/week? -           Current providers sharing in care for this patient include:   Patient Care Team:  Mira Han MD as PCP - General (Family Medicine)  Johanna Palencia RD as Diabetes Educator (Dietitian, Registered)  Fely Brown as Lead Care  Coordinator  Mira Han MD as Assigned PCP  Johnnie Chavarria MD as MD (Cardiovascular Disease)  Jassi Zayas MD as Assigned Surgical Provider    The following health maintenance items are reviewed in Epic and correct as of today:  Health Maintenance Due   Topic Date Due     ZOSTER IMMUNIZATION (1 of 2) Never done     COVID-19 Vaccine (4 - Booster for Moderna series) 02/17/2022     EYE EXAM  05/20/2022     BMP  01/04/2023     LIPID  01/04/2023     MICROALBUMIN  01/04/2023     Lab work is in process  Labs reviewed in EPIC  BP Readings from Last 3 Encounters:   12/13/22 122/64   11/28/22 128/50   06/08/22 112/40    Wt Readings from Last 3 Encounters:   12/13/22 85.8 kg (189 lb 1 oz)   11/28/22 85.3 kg (188 lb)   06/08/22 82.2 kg (181 lb 3 oz)                Patient Active Problem List   Diagnosis     Chronic pain of left ankle     Left knee pain     Type 2 diabetes mellitus without complication, without long-term current use of insulin (H)     Hypertension, unspecified type     Benign prostatic hyperplasia with urinary obstruction     Dyslipidemia     Primary osteoarthritis of both knees     Achilles tendinosis of left lower extremity     Cognitive impairment     Vitamin D insufficiency     Bilateral cataracts     Skin cyst     Past Surgical History:   Procedure Laterality Date     CHOLECYSTECTOMY      1994     HERNIA REPAIR, UMBILICAL      ?2013       Social History     Tobacco Use     Smoking status: Never     Smokeless tobacco: Never   Substance Use Topics     Alcohol use: Yes     Comment: 2-3 drinks a week hard liquor     History reviewed. No pertinent family history.        Current Outpatient Medications   Medication Sig Dispense Refill     acetaminophen (TYLENOL) 650 MG CR tablet Take 1 tablet (650 mg) by mouth every 8 hours as needed for mild pain or fever 90 tablet 3     lisinopril (ZESTRIL) 10 MG tablet Take 1 tablet (10 mg) by mouth daily 90 tablet 4     rosuvastatin (CRESTOR) 10 MG tablet Take 1  tablet (10 mg) by mouth daily 90 tablet 3     tamsulosin (FLOMAX) 0.4 MG capsule Take 1 capsule (0.4 mg) by mouth daily 90 capsule 4     urea (GORDONS UREA) 40 % external ointment Apply to yellow thick areas on feet 1-2x daily 30 g 3     ACCU-CHEK GUIDE test strip TEST TWICE DAILY 200 strip 0     alcohol swab prep pads Use to swab area of injection/loraine as directed. 100 each 3     Alcohol Swabs (ALCOHOL WIPES) 70 % PADS USE AS DIRECTED TO TEST BLOOD SUGARS 100 each 3     blood glucose (NO BRAND SPECIFIED) test strip Accu-Chek Guide test strips   TEST TWICE A DAY       blood glucose (NO BRAND SPECIFIED) test strip 1 each by Other route       blood glucose (ONETOUCH VERIO IQ) test strip Use to test blood sugar1  times daily. 100 strip 6     blood glucose calibration (ONETOUCH VERIO) solution Use to calibrate blood glucose monitor as directed. 1 each 1     blood glucose monitoring (ONETOUCH VERIO IQ SYSTEM) meter device kit Use to test blood sugar 1 times daily. 1 kit 0     cholecalciferol (VITAMIN D3) 125 mcg (5000 units) capsule  (Patient not taking: Reported on 11/28/2022)       ciclopirox (PENLAC) 8 % external solution Apply to adjacent skin and affected nails daily.  Remove with alcohol every 7 days, then repeat. Apply for 48 weeks. 6.6 mL 4     cyanocobalamin (VITAMIN B-12) 1000 MCG tablet Take 1 tablet (1,000 mcg) by mouth every other day 90 tablet 2     fluocinonide (LIDEX) 0.05 % external solution Apply to affected area on scalp BID x 2-4 weeks, tapering with improvement. Do not apply to face or body folds. 60 mL 6     fluorouracil (EFUDEX) 5 % external cream Apply to warts on right 3rd finger at night. Cover with duct tape and remove in the morning. 40 g 3     gatifloxacin (ZYMAXID) 0.5 % ophthalmic solution PLEASE SEE ATTACHED FOR DETAILED DIRECTIONS       ketoconazole (NIZORAL) 2 % external cream Apply topically 2 times daily To left foot for 4-6 weeks. 60 g 3     ketoconazole (NIZORAL) 2 % external  shampoo Wet affected area daily, apply shampoo and lather, let sit for 3-5 minutes and then rinse. 120 mL 11     ketorolac (ACULAR) 0.5 % ophthalmic solution PLEASE SEE ATTACHED FOR DETAILED DIRECTIONS       metFORMIN (GLUCOPHAGE) 500 MG tablet Take 1 tablet (500 mg) by mouth 2 times daily (with meals) 180 tablet 3     prednisoLONE acetate (PRED FORTE) 1 % ophthalmic suspension PLEASE SEE ATTACHED FOR DETAILED DIRECTIONS       RESTASIS 0.05 % ophthalmic emulsion        salicylic acid (COMPOUND W MAX STRENGTH) 17 % external gel Apply to warts on right 3rd finger at night ( mix with efudex) and cover with duct tape. Remove in the morning and restart every evening. 14 g 4     tamsulosin (FLOMAX) 0.4 MG capsule Take 1 capsule (0.4 mg) by mouth 2 times daily 180 capsule 2     vitamin D3 (CHOLECALCIFEROL) 50 mcg (2000 units) tablet Take 2 tablets (100 mcg) by mouth daily 180 tablet 2     Allergies   Allergen Reactions     No Known Allergies        Pneumonia Vaccine:For adults 65 years or older who do not have an immunocompromising condition, cerebrospinal fluid leak, or cochlear implant and want to receive PPSV23 ONLY: Administer 1 dose of PPSV23. Anyone who received any doses of PPSV23 before age 65 should receive 1 final dose of the vaccine at age 65 or older. Administer this last dose at least 5 years after the prior PPSV23 dose.        Review of Systems   Constitutional: Negative for chills and fever.   HENT: Negative for congestion, ear pain, hearing loss and sore throat.    Eyes: Negative for pain and visual disturbance.   Respiratory: Negative for cough and shortness of breath.    Cardiovascular: Negative for chest pain and palpitations.   Gastrointestinal: Negative for abdominal pain, constipation, diarrhea, heartburn, hematochezia and nausea.   Genitourinary: Positive for frequency. Negative for dysuria, genital sores, hematuria, impotence, penile discharge and urgency.   Musculoskeletal: Positive for  "arthralgias, joint swelling and myalgias.   Skin: Negative for rash.   Neurological: Negative for dizziness, weakness, headaches and paresthesias.   Psychiatric/Behavioral: Negative for mood changes. The patient is not nervous/anxious.        Constitutional, HEENT, cardiovascular, pulmonary, gi and gu systems are negative, except as otherwise noted.    OBJECTIVE:   /64 (BP Location: Left arm, Patient Position: Sitting, Cuff Size: Adult Regular)   Pulse 60   Ht 1.702 m (5' 7\")   Wt 85.8 kg (189 lb 1 oz)   SpO2 99%   BMI 29.61 kg/m     Estimated body mass index is 29.61 kg/m  as calculated from the following:    Height as of this encounter: 1.702 m (5' 7\").    Weight as of this encounter: 85.8 kg (189 lb 1 oz).  Physical Exam    GENERAL: healthy, alert and no distress  EYES: Eyes grossly normal to inspection, PERRL and conjunctivae and sclerae normal  HENT: ear canals and TM's normal, nose and mouth without ulcers or lesions  NECK: no adenopathy, no asymmetry, masses, or scars and thyroid normal to palpation  RESP: lungs clear to auscultation - no rales, rhonchi or wheezes  CV: regular rate and rhythm, normal S1 S2, no S3 or S4, no murmur, click or rub, no peripheral edema and peripheral pulses strong  MS: no gross musculoskeletal defects noted, no edema  PSYCH: mentation appears normal, affect normal    Diagnostic Test Results:  Labs reviewed in Epic  Patient has been advised of split billing requirements and indicates understanding: Yes    COUNSELING:  Reviewed preventive health counseling, as reflected in patient instructions       Regular exercise       Healthy diet/nutrition       Vision screening       Hearing screening       Dental care       Bladder control       Fall risk prevention       Aspirin prophylaxis        Hepatitis C screening       Prostate cancer screening    Estimated body mass index is 29.61 kg/m  as calculated from the following:    Height as of this encounter: 1.702 m (5' 7\").    " Weight as of this encounter: 85.8 kg (189 lb 1 oz).    Weight management plan: Discussed healthy diet and exercise guidelines    She reports that he has never smoked. He has never used smokeless tobacco.      Appropriate preventive services were discussed with this patient, including applicable screening as appropriate for cardiovascular disease, diabetes, osteopenia/osteoporosis, and glaucoma.  As appropriate for age/gender, discussed screening for colorectal cancer, prostate cancer, breast cancer, and cervical cancer. Checklist reviewing preventive services available has been given to the patient.    Reviewed patients plan of care and provided an AVS. The Basic Care Plan (routine screening as documented in Health Maintenance) for Eunice meets the Care Plan requirement. This Care Plan has been established and reviewed with the Patient.    Counseling Resources:  ATP IV Guidelines  Pooled Cohorts Equation Calculator  Breast Cancer Risk Calculator  Breast Cancer: Medication to Reduce Risk  FRAX Risk Assessment  ICSI Preventive Guidelines  Dietary Guidelines for Americans, 2010  USDA's MyPlate  ASA Prophylaxis  Lung CA Screening    Mira Han MD  [facility]    Identified Health Risks:

## 2022-12-14 LAB
CREAT UR-MCNC: 15.5 MG/DL
MICROALBUMIN UR-MCNC: <12 MG/L
MICROALBUMIN/CREAT UR: NORMAL MG/G{CREAT}

## 2022-12-26 ENCOUNTER — PATIENT OUTREACH (OUTPATIENT)
Dept: GERIATRIC MEDICINE | Facility: CLINIC | Age: 67
End: 2022-12-26

## 2022-12-26 NOTE — PROGRESS NOTES
[facility] Care Coordination Contact    Internal CC change effective 1/1/2023.  Mailed member CC Change letter.  Additional tasks to be completed by CMS include: update database & EPIC, enter CC Change in MMIS, and move member files on Q drive.    Hiwot Pierre  Case Management Specialist  [facility]  [phone]

## 2022-12-26 NOTE — LETTER
December 26, 2022    JORDAN LOZANO  [address] [place] [address]      Dear Jordan:    As a member of [facility] ([facility] ([facility]), you are provided a care coordinator. I will be your new care coordinator as of 1/1/2023. I will be calling you soon to see how you are doing and determine your needs.    If you have any questions, please feel free to call me at [phone]. If you reach my voice mail, please leave a message and your phone number. If you are hearing impaired, please call the [place] Relay at 862 or [phone] ([email or web address] relay service).    I look forward to speaking with you soon.    Sincerely,      Maricarmen Penny RN, N  [phone]  [email or web address]      [facility] is a health plan that contracts with both Medicare and the [place] Medical Assistance (Medicaid) program to provide benefits of both programs to enrollees. Enrollment in [facility] depends on contract renewal.      [facility]  [id number] DHS Approved ([id number])  [id number] (11/18)

## 2023-01-04 ENCOUNTER — PATIENT OUTREACH (OUTPATIENT)
Dept: GERIATRIC MEDICINE | Facility: CLINIC | Age: 68
End: 2023-01-04

## 2023-01-04 NOTE — PROGRESS NOTES
[facility] Care Coordination Contact    Care Coordinator contacted member to discuss some concerns about a medical bill he had received from the pharmacy. Unable to get a reach of member. Left voice message to return call.     Maricarmen Penny RN, BSN, PHN  [facility]  Phone: [phone]

## 2023-01-04 NOTE — PROGRESS NOTES
[facility] Care Coordination Contact    Spoke with member. Care coordinator did a conference call with member and requested that claim be resent with current PMI number. If not approved will contact pharmacy again for next steps.

## 2023-02-23 NOTE — PROGRESS NOTES
[facility]    Outpatient Physical Therapy Discharge Note  Patient: Jordan Miller  : 1955    Beginning/End Dates of Reporting Period:  10/8/21 to 21    Referring Provider: Kristi Wise MD    Therapy Diagnosis: decreased activity tolerance, chronic bilateral knee pain, neck pain     Client Self Report: He reports he is feeling good now.  Knees and neck feel good.     Objective Measurements:                                        Outcome Measures (most recent score):      Goals:  Goal Identifier HEP   Goal Description Patient will be independent with HEP and self mangement of symptoms   Target Date 22   Date Met      Progress (detail required for progress note):       Goal Identifier Ambulation   Goal Description Patient will walk 150 ft without pain to demonstrate improved pain free household mobility   Target Date 22   Date Met      Progress (detail required for progress note):       Goal Identifier Fall risk   Goal Description Patient will perform 12 sit to stands in 30 seconds to demonstrate incresed LE and decreased risk for falls   Target Date 22   Date Met      Progress (detail required for progress note):       Goal Identifier     Goal Description     Target Date     Date Met      Progress (detail required for progress note):       Goal Identifier     Goal Description     Target Date     Date Met      Progress (detail required for progress note):       Goal Identifier     Goal Description     Target Date     Date Met      Progress (detail required for progress note):       Goal Identifier     Goal Description     Target Date     Date Met      Progress (detail required for progress note):       Goal Identifier     Goal Description     Target Date     Date Met      Progress (detail required for progress note):         Plan:  Discharge from therapy.    Discharge:    Reason for  Discharge: Patient has failed to schedule further appointments.

## 2023-02-23 NOTE — ADDENDUM NOTE
Encounter addended by: Romero Mcmahon, PT on: 2/23/2023 10:42 AM   Actions taken: Clinical Note Signed, Flowsheet accepted, Episode resolved

## 2023-04-01 ENCOUNTER — TRANSFERRED RECORDS (OUTPATIENT)
Dept: MULTI SPECIALTY CLINIC | Facility: CLINIC | Age: 68
End: 2023-04-01

## 2023-04-01 LAB — RETINOPATHY: NORMAL

## 2023-04-30 ENCOUNTER — HEALTH MAINTENANCE LETTER (OUTPATIENT)
Age: 68
End: 2023-04-30

## 2023-05-12 ENCOUNTER — OFFICE VISIT (OUTPATIENT)
Dept: FAMILY MEDICINE | Facility: CLINIC | Age: 68
End: 2023-05-12
Payer: COMMERCIAL

## 2023-05-12 VITALS
HEIGHT: 66 IN | SYSTOLIC BLOOD PRESSURE: 138 MMHG | TEMPERATURE: 98 F | DIASTOLIC BLOOD PRESSURE: 77 MMHG | HEART RATE: 67 BPM | BODY MASS INDEX: 29.78 KG/M2 | OXYGEN SATURATION: 98 % | WEIGHT: 185.3 LBS

## 2023-05-12 DIAGNOSIS — H25.013 CORTICAL AGE-RELATED CATARACT OF BOTH EYES: ICD-10-CM

## 2023-05-12 DIAGNOSIS — Z23 NEED FOR SHINGLES VACCINE: ICD-10-CM

## 2023-05-12 DIAGNOSIS — Z01.818 PREOP EXAMINATION: Primary | ICD-10-CM

## 2023-05-12 DIAGNOSIS — E11.9 TYPE 2 DIABETES MELLITUS WITHOUT COMPLICATION, WITHOUT LONG-TERM CURRENT USE OF INSULIN (H): ICD-10-CM

## 2023-05-12 DIAGNOSIS — Z23 NEED FOR VACCINATION: ICD-10-CM

## 2023-05-12 DIAGNOSIS — L21.9 DERMATITIS, SEBORRHEIC: ICD-10-CM

## 2023-05-12 DIAGNOSIS — I10 HYPERTENSION, UNSPECIFIED TYPE: ICD-10-CM

## 2023-05-12 PROCEDURE — 90471 IMMUNIZATION ADMIN: CPT | Performed by: FAMILY MEDICINE

## 2023-05-12 PROCEDURE — 90750 HZV VACC RECOMBINANT IM: CPT | Performed by: FAMILY MEDICINE

## 2023-05-12 PROCEDURE — 99214 OFFICE O/P EST MOD 30 MIN: CPT | Mod: 25 | Performed by: FAMILY MEDICINE

## 2023-05-12 RX ORDER — LISINOPRIL 10 MG/1
1 TABLET ORAL DAILY
COMMUNITY
Start: 2021-06-01 | End: 2023-05-12

## 2023-05-12 RX ORDER — KETOCONAZOLE 20 MG/ML
SHAMPOO TOPICAL
Qty: 120 ML | Refills: 11 | Status: SHIPPED | OUTPATIENT
Start: 2023-05-12 | End: 2023-05-15

## 2023-05-12 NOTE — PROGRESS NOTES
[address] [address]  Phone: [phone]  Fax: [phone]  Primary Provider: Mira Han  Pre-op Performing Provider: MIRA HAN      PREOPERATIVE EVALUATION:  Today's date: 5/12/2023    Jordan Miller is a 68 year old male who presents for a preoperative evaluation.      5/12/2023     9:20 AM   Additional Questions   Roomed by Area F     Surgical Information:  Surgery/Procedure: Cataract Surgery  [facility]  Surgeon: Dr. Kelly Mcleod  Surgery Date: 05/30/2023, 06/14/2023  Time of Surgery: unknown  Where patient plans to recover: At home with family  Fax number for surgical facility:  [phone]     Jordan was seen today for pre-op exam.    Diagnoses and all orders for this visit:    Preop examination  Comments:  clear for surgery . normal stress ECHO dec 2022    Type 2 diabetes mellitus without complication, without long-term current use of insulin (H)  Comments:  stable   Orders:  -     Adult Eye  Referral; Future  -     HEMOGLOBIN A1C; Future  -     metFORMIN (GLUCOPHAGE) 500 MG tablet; Take 1 tablet (500 mg) by mouth 2 times daily (with meals)    Cortical age-related cataract of both eyes    Need for vaccination  -     ZOSTER VACCINE RECOMBINANT ADJUVANTED (SHINGRIX)  -     Pneumococcal 20 Valent Conjugate (PCV20); Future  -     TD (ADULT, 7+) PRESERVE FREE; Future    Hypertension, unspecified type  Comments:  well controlled     Dermatitis, seborrheic  -     ketoconazole (NIZORAL) 2 % external shampoo; Wet affected area daily, apply shampoo and lather, let sit for 3-5 minutes and then rinse.    Need for shingles vaccine    Other orders  -     REVIEW OF HEALTH MAINTENANCE PROTOCOL ORDERS        Subjective     HPI related to upcoming procedure: cataract surgery         5/12/2023     9:20 AM   Preop Questions   1. Have you ever had a heart attack or stroke? No   2. Have you ever had  surgery on your heart or blood vessels, such as a stent placement, a coronary artery bypass, or surgery on an artery in your head, neck, heart, or legs? No   3. Do you have chest pain with activity? No   4. Do you have a history of  heart failure? No   5. Do you currently have a cold, bronchitis or symptoms of other infection? No   6. Do you have a cough, shortness of breath, or wheezing? No   7. Do you or anyone in your family have previous history of blood clots? No   8. Do you or does anyone in your family have a serious bleeding problem such as prolonged bleeding following surgeries or cuts? No   9. Have you ever had problems with anemia or been told to take iron pills? No   10. Have you had any abnormal blood loss such as black, tarry or bloody stools? No   11. Have you ever had a blood transfusion? No   12. Are you willing to have a blood transfusion if it is medically needed before, during, or after your surgery? Yes   13. Have you or any of your relatives ever had problems with anesthesia? No   14. Do you have sleep apnea, excessive snoring or daytime drowsiness? No   15. Do you have any artifical heart valves or other implanted medical devices like a pacemaker, defibrillator, or continuous glucose monitor? No   16. Do you have artificial joints? No   17. Are you allergic to latex? No       Health Care Directive:  Patient does not have a Health Care Directive or Living Will: Discussed advance care planning with patient; information given to patient to review.    Preoperative Review of :   reviewed - no record of controlled substances prescribed.      Status of Chronic Conditions:  See problem list for active medical problems.  Problems all longstanding and stable, except as noted/documented.  See ROS for pertinent symptoms related to these conditions.      Review of Systems  CONSTITUTIONAL: NEGATIVE for fever, chills, change in weight  CONSTITUTIONAL:NEGATIVE for fever, chills, change in  weight  ENT/MOUTH: NEGATIVE for ear, mouth and throat problems  RESP: NEGATIVE for significant cough or SOB  CV: NEGATIVE for chest pain, palpitations or peripheral edema  : negative for dysuria, hematuria, decreased urinary stream, erectile dysfunction  PSYCHIATRIC: NEGATIVE for changes in mood or affect    Patient Active Problem List    Diagnosis Date Noted     Skin cyst 05/12/2022     Priority: Medium     left upper breast area .       Cognitive impairment 09/02/2021     Priority: Medium     Primary osteoarthritis of both knees 07/15/2019     Priority: Medium     Achilles tendinosis of left lower extremity 07/15/2019     Priority: Medium     Type 2 diabetes mellitus without complication, without long-term current use of insulin (H) 07/04/2019     Priority: Medium     Hypertension, unspecified type 07/04/2019     Priority: Medium     Benign prostatic hyperplasia with urinary obstruction 07/04/2019     Priority: Medium     Dyslipidemia 07/04/2019     Priority: Medium     Chronic pain of left ankle 07/03/2019     Priority: Medium     Left knee pain 07/03/2019     Priority: Medium     Vitamin D insufficiency 06/07/2018     Priority: Medium     Bilateral cataracts 02/01/2016     Priority: Medium      Past Medical History:   Diagnosis Date     BPH (benign prostatic hyperplasia)      Hyperlipidemia      Hypertension      Left ankle pain      Type 2 diabetes mellitus (H)      Past Surgical History:   Procedure Laterality Date     CHOLECYSTECTOMY      1994     HERNIA REPAIR, UMBILICAL      ?2013     Current Outpatient Medications   Medication Sig Dispense Refill     ACCU-CHEK GUIDE test strip TEST TWICE A  strip 0     alcohol swab prep pads Use to swab area of injection/loraine as directed. 100 each 3     Alcohol Swabs (ALCOHOL WIPES) 70 % PADS USE AS DIRECTED TO TEST BLOOD SUGARS 100 each 3     blood glucose (NO BRAND SPECIFIED) test strip Accu-Chek Guide test strips   TEST TWICE A DAY       blood glucose (NO  BRAND SPECIFIED) test strip 1 each by Other route       blood glucose (ONETOUCH VERIO IQ) test strip Use to test blood sugar1  times daily. 100 strip 6     blood glucose calibration (ONETOUCH VERIO) solution Use to calibrate blood glucose monitor as directed. 1 each 1     blood glucose monitoring (ONETOUCH VERIO IQ SYSTEM) meter device kit Use to test blood sugar 1 times daily. 1 kit 0     cholecalciferol (VITAMIN D3) 125 mcg (5000 units) capsule        cyanocobalamin (VITAMIN B-12) 1000 MCG tablet Take 1 tablet (1,000 mcg) by mouth every other day 90 tablet 2     gatifloxacin (ZYMAXID) 0.5 % ophthalmic solution PLEASE SEE ATTACHED FOR DETAILED DIRECTIONS       ketorolac (ACULAR) 0.5 % ophthalmic solution PLEASE SEE ATTACHED FOR DETAILED DIRECTIONS       lisinopril (ZESTRIL) 10 MG tablet Take 1 tablet by mouth daily       lisinopril (ZESTRIL) 10 MG tablet Take 1 tablet (10 mg) by mouth daily 90 tablet 4     metFORMIN (GLUCOPHAGE) 500 MG tablet Take 1 tablet (500 mg) by mouth 2 times daily (with meals) 180 tablet 3     prednisoLONE acetate (PRED FORTE) 1 % ophthalmic suspension PLEASE SEE ATTACHED FOR DETAILED DIRECTIONS       RESTASIS 0.05 % ophthalmic emulsion        tamsulosin (FLOMAX) 0.4 MG capsule Take 1 capsule (0.4 mg) by mouth daily 90 capsule 4     tamsulosin (FLOMAX) 0.4 MG capsule Take 1 capsule (0.4 mg) by mouth 2 times daily 180 capsule 2     acetaminophen (TYLENOL) 650 MG CR tablet Take 1 tablet (650 mg) by mouth every 8 hours as needed for mild pain or fever (Patient not taking: Reported on 5/12/2023) 90 tablet 3     ciclopirox (PENLAC) 8 % external solution Apply to adjacent skin and affected nails daily.  Remove with alcohol every 7 days, then repeat. Apply for 48 weeks. (Patient not taking: Reported on 5/12/2023) 6.6 mL 4     fluocinonide (LIDEX) 0.05 % external solution Apply to affected area on scalp BID x 2-4 weeks, tapering with improvement. Do not apply to face or body folds. (Patient not  "taking: Reported on 5/12/2023) 60 mL 6     fluorouracil (EFUDEX) 5 % external cream Apply to warts on right 3rd finger at night. Cover with duct tape and remove in the morning. (Patient not taking: Reported on 5/12/2023) 40 g 3     ketoconazole (NIZORAL) 2 % external cream Apply topically 2 times daily To left foot for 4-6 weeks. (Patient not taking: Reported on 5/12/2023) 60 g 3     ketoconazole (NIZORAL) 2 % external shampoo Wet affected area daily, apply shampoo and lather, let sit for 3-5 minutes and then rinse. (Patient not taking: Reported on 5/12/2023) 120 mL 11     rosuvastatin (CRESTOR) 10 MG tablet Take 1 tablet (10 mg) by mouth daily (Patient not taking: Reported on 5/12/2023) 90 tablet 3     salicylic acid (COMPOUND W MAX STRENGTH) 17 % external gel Apply to warts on right 3rd finger at night ( mix with efudex) and cover with duct tape. Remove in the morning and restart every evening. (Patient not taking: Reported on 5/12/2023) 14 g 4     urea (GORDONS UREA) 40 % external ointment Apply to yellow thick areas on feet 1-2x daily (Patient not taking: Reported on 5/12/2023) 30 g 3     vitamin D3 (CHOLECALCIFEROL) 50 mcg (2000 units) tablet Take 2 tablets (100 mcg) by mouth daily 180 tablet 2       Allergies   Allergen Reactions     No Known Allergies         Social History     Tobacco Use     Smoking status: Never     Smokeless tobacco: Never   Vaping Use     Vaping status: Not on file   Substance Use Topics     Alcohol use: Yes     Comment: 2-3 drinks a week hard liquor     History reviewed. No pertinent family history.  History   Drug Use Unknown         Objective     /77 (BP Location: Left arm, Patient Position: Sitting, Cuff Size: Adult Regular)   Pulse 67   Temp 98  F (36.7  C)   Ht 1.676 m (5' 6\")   Wt 84.1 kg (185 lb 4.8 oz)   SpO2 98%   BMI 29.91 kg/m      Physical Exam  GENERAL APPEARANCE: healthy, alert and no distress  HENT: ear canals and TM's normal and nose and mouth without ulcers " or lesions  RESP: lungs clear to auscultation - no rales, rhonchi or wheezes  CV: regular rate and rhythm, normal S1 S2, no S3 or S4 and no murmur, click or rub   ABDOMEN: soft, nontender, no HSM or masses and bowel sounds normal  NEURO: Normal strength and tone, sensory exam grossly normal, mentation intact and speech normal    Recent Labs   Lab Test 12/13/22  1029 05/20/22  0811 01/04/22  0947 08/31/21  0747   HGB  --   --   --  13.4   PLT  --   --   --  241   *  --  138 138   POTASSIUM 4.1  --  4.3 4.5   CR 0.92  --  0.97 0.96   A1C 6.5* 5.7* 6.6* 6.2*        Diagnostics:  No labs were ordered during this visit.   No EKG required for low risk surgery (cataract, skin procedure, breast biopsy, etc).    Revised Cardiac Risk Index (RCRI):  The patient has the following serious cardiovascular risks for perioperative complications:   - No serious cardiac risks = 0 points     RCRI Interpretation: 0 points: Class I (very low risk - 0.4% complication rate)           Signed Electronically by: Mira Han MD  Copy of this evaluation report is provided to requesting physician.

## 2023-05-15 ENCOUNTER — OFFICE VISIT (OUTPATIENT)
Dept: FAMILY MEDICINE | Facility: CLINIC | Age: 68
End: 2023-05-15
Payer: COMMERCIAL

## 2023-05-15 VITALS
SYSTOLIC BLOOD PRESSURE: 136 MMHG | BODY MASS INDEX: 29.41 KG/M2 | OXYGEN SATURATION: 99 % | DIASTOLIC BLOOD PRESSURE: 62 MMHG | HEART RATE: 70 BPM | RESPIRATION RATE: 15 BRPM | HEIGHT: 66 IN | TEMPERATURE: 97.7 F | WEIGHT: 183 LBS

## 2023-05-15 DIAGNOSIS — B35.1 DERMATOPHYTOSIS OF NAIL: ICD-10-CM

## 2023-05-15 DIAGNOSIS — I10 HYPERTENSION, UNSPECIFIED TYPE: ICD-10-CM

## 2023-05-15 DIAGNOSIS — H25.013 CORTICAL AGE-RELATED CATARACT OF BOTH EYES: ICD-10-CM

## 2023-05-15 DIAGNOSIS — M79.671 FOOT PAIN, RIGHT: ICD-10-CM

## 2023-05-15 DIAGNOSIS — E11.9 TYPE 2 DIABETES MELLITUS WITHOUT COMPLICATION, WITHOUT LONG-TERM CURRENT USE OF INSULIN (H): Primary | ICD-10-CM

## 2023-05-15 DIAGNOSIS — E11.9 TYPE 2 DIABETES MELLITUS WITHOUT COMPLICATION, WITHOUT LONG-TERM CURRENT USE OF INSULIN (H): ICD-10-CM

## 2023-05-15 DIAGNOSIS — R79.89 LOW VITAMIN B12 LEVEL: ICD-10-CM

## 2023-05-15 DIAGNOSIS — L21.9 DERMATITIS, SEBORRHEIC: ICD-10-CM

## 2023-05-15 DIAGNOSIS — Z23 NEED FOR VACCINATION: ICD-10-CM

## 2023-05-15 DIAGNOSIS — I10 PRIMARY HYPERTENSION: ICD-10-CM

## 2023-05-15 DIAGNOSIS — M17.0 PRIMARY OSTEOARTHRITIS OF BOTH KNEES: ICD-10-CM

## 2023-05-15 PROBLEM — M25.562 LEFT KNEE PAIN: Status: RESOLVED | Noted: 2019-07-03 | Resolved: 2023-05-15

## 2023-05-15 LAB — HBA1C MFR BLD: 6.8 % (ref 0–5.6)

## 2023-05-15 PROCEDURE — 36415 COLL VENOUS BLD VENIPUNCTURE: CPT | Performed by: FAMILY MEDICINE

## 2023-05-15 PROCEDURE — 90714 TD VACC NO PRESV 7 YRS+ IM: CPT | Performed by: FAMILY MEDICINE

## 2023-05-15 PROCEDURE — 99214 OFFICE O/P EST MOD 30 MIN: CPT | Mod: 25 | Performed by: FAMILY MEDICINE

## 2023-05-15 PROCEDURE — 90677 PCV20 VACCINE IM: CPT | Performed by: FAMILY MEDICINE

## 2023-05-15 PROCEDURE — 83036 HEMOGLOBIN GLYCOSYLATED A1C: CPT | Performed by: FAMILY MEDICINE

## 2023-05-15 PROCEDURE — 90472 IMMUNIZATION ADMIN EACH ADD: CPT | Performed by: FAMILY MEDICINE

## 2023-05-15 PROCEDURE — G0009 ADMIN PNEUMOCOCCAL VACCINE: HCPCS | Performed by: FAMILY MEDICINE

## 2023-05-15 RX ORDER — CICLOPIROX 80 MG/ML
SOLUTION TOPICAL
Qty: 6.6 ML | Refills: 11 | Status: SHIPPED | OUTPATIENT
Start: 2023-05-15 | End: 2023-10-09

## 2023-05-15 RX ORDER — LISINOPRIL 10 MG/1
10 TABLET ORAL DAILY
Qty: 90 TABLET | Refills: 4 | Status: SHIPPED | OUTPATIENT
Start: 2023-05-15 | End: 2023-10-09

## 2023-05-15 RX ORDER — KETOCONAZOLE 20 MG/ML
SHAMPOO TOPICAL
Qty: 120 ML | Refills: 11 | Status: SHIPPED | OUTPATIENT
Start: 2023-05-15 | End: 2023-10-11

## 2023-05-15 RX ORDER — LANOLIN ALCOHOL/MO/W.PET/CERES
1000 CREAM (GRAM) TOPICAL EVERY OTHER DAY
Qty: 90 TABLET | Refills: 2 | Status: SHIPPED | OUTPATIENT
Start: 2023-05-15 | End: 2024-09-19

## 2023-05-15 NOTE — PROGRESS NOTES
Assessment & Plan     Patient presents with:  Diabetes: Would like cholesterol, b12, kidney function tested today       Jordan was seen today for diabetes.    Diagnoses and all orders for this visit:    Type 2 diabetes mellitus without complication, without long-term current use of insulin (H)  Comments:  slight worsening of diabetes , recent stay in [place] and was drinking almost every day with friends.  Since came back following more strict diet will con metformin once daily  Orders:  -     HEMOGLOBIN A1C  -     Adult Eye  Referral  Hemoglobin A1C   Date Value Ref Range Status   05/15/2023 6.8 (H) 0.0 - 5.6 % Final     Comment:     Normal <5.7%   Prediabetes 5.7-6.4%    Diabetes 6.5% or higher     Note: Adopted from ADA consensus guidelines.   06/11/2021 6.4 (H) 0 - 5.6 % Final     Comment:     Normal <5.7% Prediabetes 5.7-6.4%  Diabetes 6.5% or higher - adopted from ADA   consensus guidelines.       Hypertension, unspecified type  Well controlled on lisinopril   Cortical age-related cataract of both eyes  preop done last wk  Primary osteoarthritis of both knees    Type 2 diabetes mellitus without complication, without long-term current use of insulin (H)  Comments:  stable   Orders:  -     HEMOGLOBIN A1C  -     Adult Eye  Referral    Need for vaccination  -     TD (ADULT, 7+) PRESERVE FREE  -     Pneumococcal 20 Valent Conjugate (PCV20)    Dermatitis, seborrheic  -     ketoconazole (NIZORAL) 2 % external shampoo; Wet affected area daily, apply shampoo and lather, let sit for 3-5 minutes and then rinse.    Low vitamin B12 level  -     cyanocobalamin (VITAMIN B-12) 1000 MCG tablet; Take 1 tablet (1,000 mcg) by mouth every other day    Primary hypertension  Comments:  BP in good range on 10 mg dose   Orders:  -     lisinopril (ZESTRIL) 10 MG tablet; Take 1 tablet (10 mg) by mouth daily    Dermatophytosis of nail  -     ciclopirox (PENLAC) 8 % external solution; Apply to adjacent skin and affected  nails daily.  Remove with alcohol every 7 days, then repeat.    Other orders  -     Cancel: Pneumococcal 20 Valent Conjugate (PCV20)                No follow-ups on file.      Subjective   Jordan Miller 68 year old who presents for the following health issues           HPI   Plan to have cataract surgery. already had preop done and clear for the surgery     Wt Readings from Last 3 Encounters:   05/15/23 83 kg (183 lb)   05/12/23 84.1 kg (185 lb 4.8 oz)   12/13/22 85.8 kg (189 lb 1 oz)     Diabetes Follow-up    How often are you checking your blood sugar? Two times daily  Blood sugar testing frequency justification:  Uncontrolled diabetes  What time of day are you checking your blood sugars (select all that apply)?  morning and night  Have you had any blood sugars above 200?  No  Have you had any blood sugars below 70?  No    What symptoms do you notice when your blood sugar is low?  None and Not applicable    What concerns do you have today about your diabetes? None     Do you have any of these symptoms? (Select all that apply)  Numbness in feet and Burning in feet, pain in feet    Have you had a diabetic eye exam in the last 12 months? Yes- Date of last eye exam: unsure,  Location: [facility]        Hyperlipidemia Follow-Up      Are you regularly taking any medication or supplement to lower your cholesterol?   No    Are you having muscle aches or other side effects that you think could be caused by your cholesterol lowering medication?  No    Hypertension Follow-up      Do you check your blood pressure regularly outside of the clinic? Yes     Are you following a low salt diet? Yes    Are your blood pressures ever more than 140 on the top number (systolic) OR more   than 90 on the bottom number (diastolic), for example 140/90? Yes    BP Readings from Last 2 Encounters:   05/15/23 136/62   05/12/23 138/77     Hemoglobin A1C (%)   Date Value   05/15/2023 6.8 (H)   12/13/2022 6.5 (H)   06/11/2021 6.4  (H)   04/23/2021 6.5 (H)     LDL Cholesterol Calculated (mg/dL)   Date Value   12/13/2022 100   01/04/2022 116   04/23/2021 106 (H)   07/03/2019 82         Patient Active Problem List   Diagnosis     Chronic pain of left ankle     Type 2 diabetes mellitus without complication, without long-term current use of insulin (H)     Hypertension, unspecified type     Benign prostatic hyperplasia with urinary obstruction     Dyslipidemia     Primary osteoarthritis of both knees     Achilles tendinosis of left lower extremity     Cognitive impairment     Vitamin D insufficiency     Bilateral cataracts     Skin cyst        Current Outpatient Medications   Medication     ACCU-CHEK GUIDE test strip     acetaminophen (TYLENOL) 650 MG CR tablet     alcohol swab prep pads     Alcohol Swabs (ALCOHOL WIPES) 70 % PADS     blood glucose (NO BRAND SPECIFIED) test strip     blood glucose (NO BRAND SPECIFIED) test strip     blood glucose (ONETOUCH VERIO IQ) test strip     blood glucose calibration (ONETOUCH VERIO) solution     blood glucose monitoring (ONETOUCH VERIO IQ SYSTEM) meter device kit     cholecalciferol (VITAMIN D3) 125 mcg (5000 units) capsule     ciclopirox (PENLAC) 8 % external solution     cyanocobalamin (VITAMIN B-12) 1000 MCG tablet     gatifloxacin (ZYMAXID) 0.5 % ophthalmic solution     ketoconazole (NIZORAL) 2 % external shampoo     ketorolac (ACULAR) 0.5 % ophthalmic solution     lisinopril (ZESTRIL) 10 MG tablet     metFORMIN (GLUCOPHAGE) 500 MG tablet     prednisoLONE acetate (PRED FORTE) 1 % ophthalmic suspension     RESTASIS 0.05 % ophthalmic emulsion     rosuvastatin (CRESTOR) 10 MG tablet     tamsulosin (FLOMAX) 0.4 MG capsule     vitamin D3 (CHOLECALCIFEROL) 50 mcg (2000 units) tablet     No current facility-administered medications for this visit.          Social Determinants of Health     Tobacco Use: Low Risk  (5/12/2023)    Patient History      Smoking Tobacco Use: Never      Smokeless Tobacco Use: Never     "  Passive Exposure: Not on file   Alcohol Use: Unknown (7/3/2019)    AUDIT-C      Frequency of Alcohol Consumption: 2-3 times a week      Average Number of Drinks: Not on file      Frequency of Binge Drinking: Not on file   Financial Resource Strain: Not on file   Food Insecurity: Not on file   Transportation Needs: Not on file   Physical Activity: Not on file   Stress: Not on file   Social Connections: Not on file   Intimate Partner Violence: Not on file   Depression: Not at risk (5/15/2023)    PHQ-2      PHQ-2 Score: 0   Housing Stability: Not on file        Review of Systems   Constitutional, cardiovascular, pulmonary, GI, , musculoskeletal, neuro, skin, endocrine and psych systems are negative, except as otherwise noted.      Objective      /62 (BP Location: Left arm, Patient Position: Sitting, Cuff Size: Adult Regular)   Pulse 70   Temp 97.7  F (36.5  C) (Oral)   Resp 15   Ht 1.676 m (5' 6\")   Wt 83 kg (183 lb)   SpO2 99%   BMI 29.54 kg/m       Physical Exam   GENERAL: healthy, alert and no distress  NECK: no adenopathy, no asymmetry, masses, or scars and thyroid normal to palpation  RESP: lungs clear to auscultation - no rales, rhonchi or wheezes  CV: regular rate and rhythm, normal S1 S2, no S3 or S4, no murmur, click or rub, no peripheral edema and peripheral pulses strong  ABDOMEN: soft, nontender, no hepatosplenomegaly, no masses and bowel sounds normal  MS: no gross musculoskeletal defects noted, no edema    Mira Han MD   [facility].  [phone]   "

## 2023-05-18 ENCOUNTER — PATIENT OUTREACH (OUTPATIENT)
Dept: GERIATRIC MEDICINE | Facility: CLINIC | Age: 68
End: 2023-05-18
Payer: COMMERCIAL

## 2023-05-18 NOTE — PROGRESS NOTES
[facility] Care Coordination Contact      [facility] Six-Month Telephone Assessment    6 month telephone assessment completed on 5/16/23.    ER visits: No  Hospitalizations: No  TCU stays: No  Significant health status changes: None. He is going to have cataract surgery end of May. Otherwise, no other changes.  Falls/Injuries: No  ADL/IADL changes: No  Changes in services: No    Caregiver Assessment follow up:  No changes.     Goals: See POC in chart for goal progress documentation.      Will see member in 6 months for an annual health risk assessment.   Encouraged member to call CC with any questions or concerns in the meantime.     Maricarmen Penny RN, BSN, PHN  [facility]  Phone: [phone]

## 2023-07-03 ENCOUNTER — PATIENT OUTREACH (OUTPATIENT)
Dept: GERIATRIC MEDICINE | Facility: CLINIC | Age: 68
End: 2023-07-03
Payer: COMMERCIAL

## 2023-07-03 NOTE — PROGRESS NOTES
[facility] Care Coordination Contact    TC from Jordan. He had an appointment today and was told [place] or prep[place] health plan since 4/1.   Jordan was completely unaware of this. I informed him as care coordination we cover him and additional 90 days past MA/Ucare termination.    Jordan went down to [facility] and was told they had him listed as moving back to [facility]. Jordan is unsure why this error occurred, but assured them he is still in [place] and has no intent to return to [facility].   They are calling him on Wednesday to complete an application and get him reinstated.     Marian Barajas [facility], Manager   [facility]  [phone]

## 2023-07-06 ENCOUNTER — PATIENT OUTREACH (OUTPATIENT)
Dept: GERIATRIC MEDICINE | Facility: CLINIC | Age: 68
End: 2023-07-06
Payer: COMMERCIAL

## 2023-07-06 NOTE — PROGRESS NOTES
[facility] Care Coordination Contact    CC contacted member to discuss his progress on getting his insurance figured out. He stated that he just spoke to the county today and they will have it processed by tomorrow 7/7/23. [place].     Maricarmen Penny RN, BSN, PHN  [facility]  Phone: [phone]     Name band;

## 2023-07-17 ENCOUNTER — TRANSFERRED RECORDS (OUTPATIENT)
Dept: HEALTH INFORMATION MANAGEMENT | Facility: CLINIC | Age: 68
End: 2023-07-17
Payer: COMMERCIAL

## 2023-07-25 ENCOUNTER — PATIENT OUTREACH (OUTPATIENT)
Dept: GERIATRIC MEDICINE | Facility: CLINIC | Age: 68
End: 2023-07-25
Payer: COMMERCIAL

## 2023-07-25 NOTE — PROGRESS NOTES
ZONIA received phone call from Jordan stating that he his [facility] coverage has been reinstated. [place] that [place] has started again yet. He then clarified that the notification letter stated that it will start August 1, 2023. He was calling to request his insurance card. ZONIA informed him that they will be coming in the mail, if he does not receive it by August 1, he should contact [facility].  CC informed Jordan to follow up in August. Jordan verbalized understanding.    Maricarmen Penny RN, BSN, PHN  [facility]  Phone: [phone]

## 2023-08-18 ENCOUNTER — PATIENT OUTREACH (OUTPATIENT)
Dept: GERIATRIC MEDICINE | Facility: CLINIC | Age: 68
End: 2023-08-18
Payer: COMMERCIAL

## 2023-08-18 NOTE — PROGRESS NOTES
[facility] Care Coordination Contact    Member [facility] with  [facility]  Previous Health Plan: MA/Fee For Service  Previous Care System: [facility]  Previous care coordinators name and number: Maricarmen Freedman Type: N/A  Last MMIS Entry: Date 1/1/2023 and Type 98 OTHER  MMIS visit date (and type) if different from above: 11/16/22 [address] [place] [address]  Services Listed in MMIS: N/A  UTF received: No UTF to request  Address/Phone discrepancy: N/A  [facility] mailed.  Mailed [facility] leave behind    Lucita Blanton  Case Management Specialist   [facility]  [phone]

## 2023-08-18 NOTE — LETTER
August 18, 2023    JORDAN LOZANO  [address] [place] [address]      Dear Jordan,    Welcome to [facility] health program. My name is Maricarmen Penny RN, PHN. I am your [facility] care coordinator. You're eligible for care coordination through your [facility] Product.    As your care coordinator, we ll:  Meet to go over your care coordination benefits  Talk about your physical and mental health care needs   Review your preventative care needs  Create a plan that meets your needs with the services you choose    What happens next?  I ll call you soon to introduce myself and tell you more about my role. We ll then plan time to go over your health and safety needs. Our goal is to keep you as healthy and independent as possible.    [facility] combines the benefits you may already have receive from Medical Assistance, Medicare and the Prescription Drug Coverage Program. Soon you'll receive a new member identification (ID) card from [facility]. Use this card whenever you get health services.    The [facility] care coordination program is voluntary and offered to you at no cost. If you wish to stop being in the care coordination program or have questions, call me at [phone]. If you reach my voicemail, leave a message and your phone number. TTY users, call the [place] [employer] at 427 or [phone] (qriwyl-fr-xnlgzt relay service).    Sincerely,      Maricarmen Penny RN, PHN  [phone]  [email or web address]      [facility] ([facility]) is a health plan that contracts with both Medicare and the [place] Medical Assistance (Medicaid) program to provide benefits of both programs to enrollees. Enrollment in [facility] depends on contract renewal.    [id number] accepted   [id number]         [id number] (07/2022)

## 2023-08-28 ENCOUNTER — OFFICE VISIT (OUTPATIENT)
Dept: DERMATOLOGY | Facility: CLINIC | Age: 68
End: 2023-08-28
Payer: COMMERCIAL

## 2023-08-28 ENCOUNTER — PATIENT OUTREACH (OUTPATIENT)
Dept: GERIATRIC MEDICINE | Facility: CLINIC | Age: 68
End: 2023-08-28

## 2023-08-28 DIAGNOSIS — B07.9 VERRUCA VULGARIS: Primary | ICD-10-CM

## 2023-08-28 PROCEDURE — 99214 OFFICE O/P EST MOD 30 MIN: CPT | Mod: 25 | Performed by: STUDENT IN AN ORGANIZED HEALTH CARE EDUCATION/TRAINING PROGRAM

## 2023-08-28 PROCEDURE — 17110 DESTRUCTION B9 LES UP TO 14: CPT | Performed by: STUDENT IN AN ORGANIZED HEALTH CARE EDUCATION/TRAINING PROGRAM

## 2023-08-28 RX ORDER — IMIQUIMOD 12.5 MG/.25G
CREAM TOPICAL
Qty: 8 PACKET | Refills: 3 | Status: SHIPPED | OUTPATIENT
Start: 2023-08-28 | End: 2023-10-11

## 2023-08-28 NOTE — PROGRESS NOTES
[facility] Dermatology Note    Encounter Date: Aug 28, 2023    Dermatology Problem List:    ______________________________________    Impression/Plan:  Jordan was seen today for wart.    Diagnoses and all orders for this visit:    Verruca vulgaris (chronic flaring)  -     DESTRUCT BENIGN LESION, UP TO 14  -     imiquimod (ALDARA) 5 % external cream; Apply imiquimod to warts and cover with bandaid. Repeat nightly until resolved. Pause treatment if the area becomes very inflamed, resume when things are more calm  -1 wart resolved, however has developed 2 new ones  - Repeat freezing x2 cycles today  - Apply imiquimod cream under occlusion nightly until wart resolves follow-up in 1 to 2 months      Cryotherapy procedure note: After verbal consent and discussion of risks and benefits including but no limited to dyspigmentation/scar, blister, and pain, 2 warts on R handwas(were) treated with 1-2mm freeze border for 2 cycles with liquid nitrogen. Post cryotherapy instructions were provided.     Follow-up in 1-2 mo.       Staff Involved:  Staff Only    Jassi Zayas MD   of Dermatology  Department of Dermatology  [facility]      CC:   Chief Complaint   Patient presents with    Wart       History of Present Illness:  Mr. Jordan Miller is a 68 year old male who presents as a return patient.    He was last seen in November for a wart L index finger  which was frozen and recommended 40% salicylic acid pad    Labs:      Physical exam:  Vitals: There were no vitals taken for this visit.  GEN: well developed, well-nourished, in no acute distress, in a pleasant mood.     SKIN: Crane phototype 3  - Focused examination of the hands was performed.  - verrucous papules on L hand x2  - No other lesions of concern on areas examined.     Past Medical History:   Past Medical History:   Diagnosis Date    BPH (benign prostatic hyperplasia)     Hyperlipidemia      Hypertension     Left ankle pain     Type 2 diabetes mellitus (H)      Past Surgical History:   Procedure Laterality Date    CHOLECYSTECTOMY      1994    HERNIA REPAIR, UMBILICAL      ?2013       Social History:   reports that he has never smoked. He has never used smokeless tobacco. He reports current alcohol use. He reports that he does not use drugs.    Family History:  No family history on file.    Medications:  Current Outpatient Medications   Medication Sig Dispense Refill    ACCU-CHEK GUIDE test strip TEST TWICE A  strip 0    acetaminophen (TYLENOL) 650 MG CR tablet Take 1 tablet (650 mg) by mouth every 8 hours as needed for mild pain or fever 90 tablet 3    alcohol swab prep pads Use to swab area of injection/loraine as directed. 100 each 3    Alcohol Swabs (ALCOHOL WIPES) 70 % PADS USE AS DIRECTED TO TEST BLOOD SUGARS 100 each 3    blood glucose (NO BRAND SPECIFIED) test strip Accu-Chek Guide test strips   TEST TWICE A DAY      blood glucose (NO BRAND SPECIFIED) test strip 1 each by Other route      blood glucose (ONETOUCH VERIO IQ) test strip Use to test blood sugar1  times daily. 100 strip 6    blood glucose calibration (ONETOUCH VERIO) solution Use to calibrate blood glucose monitor as directed. 1 each 1    blood glucose monitoring (ONETOUCH VERIO IQ SYSTEM) meter device kit Use to test blood sugar 1 times daily. 1 kit 0    cholecalciferol (VITAMIN D3) 125 mcg (5000 units) capsule       ciclopirox (PENLAC) 8 % external solution Apply to adjacent skin and affected nails daily.  Remove with alcohol every 7 days, then repeat. 6.6 mL 11    cyanocobalamin (VITAMIN B-12) 1000 MCG tablet Take 1 tablet (1,000 mcg) by mouth every other day 90 tablet 2    gatifloxacin (ZYMAXID) 0.5 % ophthalmic solution PLEASE SEE ATTACHED FOR DETAILED DIRECTIONS      imiquimod (ALDARA) 5 % external cream Apply imiquimod to warts and cover with bandaid. Repeat nightly until resolved. Pause treatment if the area becomes very  inflamed, resume when things are more calm 8 packet 3    ketoconazole (NIZORAL) 2 % external shampoo Wet affected area daily, apply shampoo and lather, let sit for 3-5 minutes and then rinse. 120 mL 11    ketorolac (ACULAR) 0.5 % ophthalmic solution PLEASE SEE ATTACHED FOR DETAILED DIRECTIONS      lisinopril (ZESTRIL) 10 MG tablet Take 1 tablet (10 mg) by mouth daily 90 tablet 4    metFORMIN (GLUCOPHAGE) 500 MG tablet Take 1 tablet (500 mg) by mouth 2 times daily (with meals) 180 tablet 3    prednisoLONE acetate (PRED FORTE) 1 % ophthalmic suspension PLEASE SEE ATTACHED FOR DETAILED DIRECTIONS      RESTASIS 0.05 % ophthalmic emulsion       rosuvastatin (CRESTOR) 10 MG tablet Take 1 tablet (10 mg) by mouth daily 90 tablet 3    tamsulosin (FLOMAX) 0.4 MG capsule Take 1 capsule (0.4 mg) by mouth daily 90 capsule 4    vitamin D3 (CHOLECALCIFEROL) 50 mcg (2000 units) tablet Take 2 tablets (100 mcg) by mouth daily 180 tablet 2     Allergies   Allergen Reactions    No Known Allergies

## 2023-08-28 NOTE — Clinical Note
Just an FYI. Jordan is going to meet me next month instead because he is going on vacation at the end of this month.

## 2023-08-28 NOTE — LETTER
8/28/2023         RE: Jordan Miller  [address] [place] [address]        Dear Colleague,    Thank you for referring your patient, Jordan Miller, to [facility]. Please see a copy of my visit note below.    [facility] Dermatology Note    Encounter Date: Aug 28, 2023    Dermatology Problem List:    ______________________________________    Impression/Plan:  Jordan was seen today for wart.    Diagnoses and all orders for this visit:    Verruca vulgaris (chronic flaring)  -     DESTRUCT BENIGN LESION, UP TO 14  -     imiquimod (ALDARA) 5 % external cream; Apply imiquimod to warts and cover with bandaid. Repeat nightly until resolved. Pause treatment if the area becomes very inflamed, resume when things are more calm  -1 wart resolved, however has developed 2 new ones  - Repeat freezing x2 cycles today  - Apply imiquimod cream under occlusion nightly until wart resolves follow-up in 1 to 2 months      Cryotherapy procedure note: After verbal consent and discussion of risks and benefits including but no limited to dyspigmentation/scar, blister, and pain, 2 warts on R handwas(were) treated with 1-2mm freeze border for 2 cycles with liquid nitrogen. Post cryotherapy instructions were provided.     Follow-up in 1-2 mo.       Staff Involved:  Staff Only    Jassi Zayas MD   of Dermatology  Department of Dermatology  [facility]      CC:   Chief Complaint   Patient presents with     Wart       History of Present Illness:  Mr. Jordan Miller is a 68 year old male who presents as a return patient.    He was last seen in November for a wart L index finger  which was frozen and recommended 40% salicylic acid pad    Labs:      Physical exam:  Vitals: There were no vitals taken for this visit.  GEN: well developed, well-nourished, in no acute distress, in a pleasant mood.     SKIN: Crane phototype 3  - Focused  examination of the hands was performed.  - verrucous papules on L hand x2  - No other lesions of concern on areas examined.     Past Medical History:   Past Medical History:   Diagnosis Date     BPH (benign prostatic hyperplasia)      Hyperlipidemia      Hypertension      Left ankle pain      Type 2 diabetes mellitus (H)      Past Surgical History:   Procedure Laterality Date     CHOLECYSTECTOMY      1994     HERNIA REPAIR, UMBILICAL      ?2013       Social History:   reports that he has never smoked. He has never used smokeless tobacco. He reports current alcohol use. He reports that he does not use drugs.    Family History:  No family history on file.    Medications:  Current Outpatient Medications   Medication Sig Dispense Refill     ACCU-CHEK GUIDE test strip TEST TWICE A  strip 0     acetaminophen (TYLENOL) 650 MG CR tablet Take 1 tablet (650 mg) by mouth every 8 hours as needed for mild pain or fever 90 tablet 3     alcohol swab prep pads Use to swab area of injection/loraine as directed. 100 each 3     Alcohol Swabs (ALCOHOL WIPES) 70 % PADS USE AS DIRECTED TO TEST BLOOD SUGARS 100 each 3     blood glucose (NO BRAND SPECIFIED) test strip Accu-Chek Guide test strips   TEST TWICE A DAY       blood glucose (NO BRAND SPECIFIED) test strip 1 each by Other route       blood glucose (ONETOUCH VERIO IQ) test strip Use to test blood sugar1  times daily. 100 strip 6     blood glucose calibration (ONETOUCH VERIO) solution Use to calibrate blood glucose monitor as directed. 1 each 1     blood glucose monitoring (ONETOUCH VERIO IQ SYSTEM) meter device kit Use to test blood sugar 1 times daily. 1 kit 0     cholecalciferol (VITAMIN D3) 125 mcg (5000 units) capsule        ciclopirox (PENLAC) 8 % external solution Apply to adjacent skin and affected nails daily.  Remove with alcohol every 7 days, then repeat. 6.6 mL 11     cyanocobalamin (VITAMIN B-12) 1000 MCG tablet Take 1 tablet (1,000 mcg) by mouth every other day 90  tablet 2     gatifloxacin (ZYMAXID) 0.5 % ophthalmic solution PLEASE SEE ATTACHED FOR DETAILED DIRECTIONS       imiquimod (ALDARA) 5 % external cream Apply imiquimod to warts and cover with bandaid. Repeat nightly until resolved. Pause treatment if the area becomes very inflamed, resume when things are more calm 8 packet 3     ketoconazole (NIZORAL) 2 % external shampoo Wet affected area daily, apply shampoo and lather, let sit for 3-5 minutes and then rinse. 120 mL 11     ketorolac (ACULAR) 0.5 % ophthalmic solution PLEASE SEE ATTACHED FOR DETAILED DIRECTIONS       lisinopril (ZESTRIL) 10 MG tablet Take 1 tablet (10 mg) by mouth daily 90 tablet 4     metFORMIN (GLUCOPHAGE) 500 MG tablet Take 1 tablet (500 mg) by mouth 2 times daily (with meals) 180 tablet 3     prednisoLONE acetate (PRED FORTE) 1 % ophthalmic suspension PLEASE SEE ATTACHED FOR DETAILED DIRECTIONS       RESTASIS 0.05 % ophthalmic emulsion        rosuvastatin (CRESTOR) 10 MG tablet Take 1 tablet (10 mg) by mouth daily 90 tablet 3     tamsulosin (FLOMAX) 0.4 MG capsule Take 1 capsule (0.4 mg) by mouth daily 90 capsule 4     vitamin D3 (CHOLECALCIFEROL) 50 mcg (2000 units) tablet Take 2 tablets (100 mcg) by mouth daily 180 tablet 2     Allergies   Allergen Reactions     No Known Allergies                  Again, thank you for allowing me to participate in the care of your patient.        Sincerely,        Jassi Zayas MD

## 2023-08-30 NOTE — PROGRESS NOTES
[facility] Care Coordination Contact      [facility] Refusal Telephone Assessment    Member refused home visit HRA on 8/28/23 (reason: Member will not be available until September. Visit is scheduled for 9/18/23).    ER visits: No  Hospitalizations: No  Health concerns: None  Falls/Injuries: No  ADL/IADL Dependencies: n/a        Member currently receiving the following home care services:   None  Member currently receiving the following community resources:    Informal support(s):  None    Advanced Care Planning discussion, complete code section.    [facility] sponsored benefits: Shared information re: Silver Sneakers/gym memberships, ASA, Calcium +D.    Follow-Up Plan: Jordan states that he is not available this month for a visit because he will be on vacation this week and will be happy to meet with CC on September 18 at 9:00 am.     This CC note routed to PCP, Mira Han.      Maricarmen Penny RN, BSN, PHN  [facility]  Phone: [phone]

## 2023-09-05 ENCOUNTER — PATIENT OUTREACH (OUTPATIENT)
Dept: GERIATRIC MEDICINE | Facility: CLINIC | Age: 68
End: 2023-09-05
Payer: COMMERCIAL

## 2023-09-05 NOTE — PROGRESS NOTES
[facility] Care Coordination Contact    Called member to schedule annual HRA home visit. HRA has been scheduled for 9/20/23.    Maricarmen Penny RN, BSN, PHN  [facility]  Phone: [phone]

## 2023-09-20 ENCOUNTER — PATIENT OUTREACH (OUTPATIENT)
Dept: GERIATRIC MEDICINE | Facility: CLINIC | Age: 68
End: 2023-09-20
Payer: COMMERCIAL

## 2023-09-20 ASSESSMENT — ACTIVITIES OF DAILY LIVING (ADL): DEPENDENT_IADLS:: INDEPENDENT

## 2023-09-20 NOTE — PROGRESS NOTES
[facility] Care Coordination Contact    [facility] Home Visit Assessment     Home visit for Health Risk Assessment with Jordan Miller completed on September 20, 2023    Type of residence:: Private home - stairs  Current living arrangement:: I live in a private home with family     Assessment completed with:: Patient, Spouse or significant other    Current Care Plan  Member currently receiving the following home care services:     Member currently receiving the following community resources: None      Medication Review  Medication reconciliation completed in Epic: Yes  Medication set-up & administration: Independent-does not set up.  Self-administers medications.  Medication Risk Assessment Medication (1 or more, place referral to MTM): N/A: No risk factors identified  MTM Referral Placed: No: No risk factors idenified    Mental/Behavioral Health   Depression Screening:           Mental health DX:: No        Falls Assessment:   Fallen 2 or more times in the past year?: No   Any fall with injury in the past year?: No    ADL/IADL Dependencies:   Dependent ADLs:: Independent  Dependent IADLs:: Independent    [facility] sponsored benefits: Shared information re: Silver Sneakers/gym memberships, ASA, Calcium +D.    PCA Assessment completed at visit: Not Applicable     Elderly Waiver Eligibility: No-does not meet criteria    Care Plan & Recommendations: Jordan remains independent. He states that he goes to the gym on regular basis and works out about 2 hrs a day. He still drives himself to where he needs to go. He is able to prep his own meals and do his own shopping. He manages his own finances and paperwork. No services needed or requested at this time. Jordan also lives with his daughter and son in law and their son.     See [facility] for detailed assessment information.    Follow-Up Plan: Member informed of future contact, plan to f/u with member with a 6 month telephone assessment.  Contact information  shared with member and family, encouraged member to call with any questions or concerns at any time.    [place] care continuum providers: Please see Snapshot and Care Management Flowsheets for Specific details of care plan.    This CC note routed to PCP, Mira Han.    Maricarmen Penny RN, BSN, PHN  [facility]  Phone: [phone]

## 2023-09-27 ENCOUNTER — TRANSFERRED RECORDS (OUTPATIENT)
Dept: HEALTH INFORMATION MANAGEMENT | Facility: CLINIC | Age: 68
End: 2023-09-27
Payer: COMMERCIAL

## 2023-10-01 ENCOUNTER — HEALTH MAINTENANCE LETTER (OUTPATIENT)
Age: 68
End: 2023-10-01

## 2023-10-03 DIAGNOSIS — I10 HYPERTENSION, UNSPECIFIED TYPE: ICD-10-CM

## 2023-10-04 ENCOUNTER — PATIENT OUTREACH (OUTPATIENT)
Dept: GERIATRIC MEDICINE | Facility: CLINIC | Age: 68
End: 2023-10-04
Payer: COMMERCIAL

## 2023-10-04 RX ORDER — LISINOPRIL 5 MG/1
5 TABLET ORAL DAILY
Qty: 90 TABLET | Refills: 3 | Status: SHIPPED | OUTPATIENT
Start: 2023-10-04 | End: 2023-10-09

## 2023-10-04 NOTE — PROGRESS NOTES
[facility] Care Coordination Contact    Received after visit chart from care coordinator.  Completed following tasks: Mailed copy of care plan to client, Mailed Safe Medication Disposal , and Mailed [employer] Leave Behind Letter    Sara Russell  [facility]  Case Management Specialist  [phone]

## 2023-10-04 NOTE — LETTER
October 4, 2023      JORDAN LOZANO  [address] [place] [address]      Dear Jordan:    At [facility], we re dedicated to improving your health and wellness. Enclosed is the Care Plan developed with you on 9/20/23. Please review the Care Plan carefully.    As a reminder, during your visit we talked about:  Ways to manage your physical and mental health  Using health care to maintain and improve your health   Your preventive care needs     Remember to contact your care coordinator if you:  Are hospitalized, or plan to be hospitalized   Have a fall    Have a change in your physical or mental health  Need help finding support or services    If you have questions, or don t agree with your Care Plan, call me at [phone]. You can also call me if your needs change. TTY users, call the [place] [employer] at (703) or 1-732.712.1634 (oobeup-af-qclyxj relay service).    Sincerely,      Maricarmen Penny RN, PHN  [phone]  [email or web address]      [facility] is a health plan that contracts with both Medicare and the [place] Medical Assistance (Medicaid) program to provide benefits of both programs to enrollees. Enrollment in [facility] depends on contract renewal.    [id number] accepted    [id number] (07/2022)

## 2023-10-06 ENCOUNTER — TELEPHONE (OUTPATIENT)
Dept: FAMILY MEDICINE | Facility: CLINIC | Age: 68
End: 2023-10-06
Payer: COMMERCIAL

## 2023-10-06 NOTE — TELEPHONE ENCOUNTER
Patient is requesting new Rx for Glimepiride 1 MG tablet. Assisted patient in schedule office visit to discuss medication.

## 2023-10-09 ENCOUNTER — OFFICE VISIT (OUTPATIENT)
Dept: FAMILY MEDICINE | Facility: CLINIC | Age: 68
End: 2023-10-09
Payer: COMMERCIAL

## 2023-10-09 VITALS
BODY MASS INDEX: 30.28 KG/M2 | HEART RATE: 80 BPM | WEIGHT: 188.4 LBS | SYSTOLIC BLOOD PRESSURE: 104 MMHG | DIASTOLIC BLOOD PRESSURE: 60 MMHG | HEIGHT: 66 IN | TEMPERATURE: 97.3 F | OXYGEN SATURATION: 98 % | RESPIRATION RATE: 12 BRPM

## 2023-10-09 DIAGNOSIS — M25.561 ARTHRALGIA OF BOTH KNEES: ICD-10-CM

## 2023-10-09 DIAGNOSIS — B35.1 DERMATOPHYTOSIS OF NAIL: ICD-10-CM

## 2023-10-09 DIAGNOSIS — Z23 NEED FOR TDAP VACCINATION: ICD-10-CM

## 2023-10-09 DIAGNOSIS — E11.9 TYPE 2 DIABETES MELLITUS WITHOUT COMPLICATION, WITHOUT LONG-TERM CURRENT USE OF INSULIN (H): Primary | ICD-10-CM

## 2023-10-09 DIAGNOSIS — I10 HYPERTENSION, UNSPECIFIED TYPE: ICD-10-CM

## 2023-10-09 DIAGNOSIS — M25.562 ARTHRALGIA OF BOTH KNEES: ICD-10-CM

## 2023-10-09 LAB — HBA1C MFR BLD: 6.8 % (ref 0–5.6)

## 2023-10-09 PROCEDURE — 83036 HEMOGLOBIN GLYCOSYLATED A1C: CPT | Performed by: FAMILY MEDICINE

## 2023-10-09 PROCEDURE — 99214 OFFICE O/P EST MOD 30 MIN: CPT | Performed by: FAMILY MEDICINE

## 2023-10-09 PROCEDURE — 36415 COLL VENOUS BLD VENIPUNCTURE: CPT | Performed by: FAMILY MEDICINE

## 2023-10-09 RX ORDER — CICLOPIROX 80 MG/ML
SOLUTION TOPICAL
Qty: 6.6 ML | Refills: 11 | Status: SHIPPED | OUTPATIENT
Start: 2023-10-09

## 2023-10-09 RX ORDER — LISINOPRIL 5 MG/1
5 TABLET ORAL DAILY
Qty: 90 TABLET | Refills: 3 | Status: SHIPPED | OUTPATIENT
Start: 2023-10-09 | End: 2023-11-28

## 2023-10-09 RX ORDER — MENTHOL AND METHYL SALICYLATE 7.6; 29 G/100G; G/100G
OINTMENT TOPICAL EVERY 6 HOURS PRN
Qty: 99.2 G | Refills: 1 | Status: SHIPPED | OUTPATIENT
Start: 2023-10-09

## 2023-10-09 RX ORDER — GLIMEPIRIDE 2 MG/1
2 TABLET ORAL
Qty: 90 TABLET | Refills: 3 | Status: SHIPPED | OUTPATIENT
Start: 2023-10-09 | End: 2023-10-10

## 2023-10-09 NOTE — PROGRESS NOTES
"  Assessment & Plan     Patient presents with:  Recheck Medication: Discuss Glimepiride, Rx for icy hot?        Jordan was seen today for recheck medication.    Diagnoses and all orders for this visit:    Type 2 diabetes mellitus without complication, without long-term current use of insulin (H)  Comments:  added provide 2 mg dose with 500 mg of metformin he does not want to increase the dose of metformin had taken glimepiride before.  A1c continued to be at 6.8  Orders:  -     Adult Eye  Referral; Future  -     HEMOGLOBIN A1C; Future  -     HEMOGLOBIN A1C    Hypertension, unspecified type  Comments:  Was taking lisinopril twice a day advised patient to just take once daily 5 mg dose blood pressure is slightly on the lower side today  Orders:  -     glimepiride (AMARYL) 2 MG tablet; Take 1 tablet (2 mg) by mouth every morning (before breakfast)    Dermatophytosis of nail  Comments:  Continue local treatment with [place]  Orders:  -     ciclopirox (PENLAC) 8 % external solution; Apply to adjacent skin and affected nails daily.  Remove with alcohol every 7 days, then repeat.    Need for Tdap vaccination    Arthralgia of both knees  Comments:  Request [employer] prescription drug  Orders:  -     methyl salicylate-menthol (ICY HOT) ointment; Apply topically every 6 hours as needed (arthirist pain)    Other orders  -     INFLUENZA VACCINE 65+ (FLUZONE HD); Future  -     COVID-19 12+ (2023-24) (PFIZER); Future  -     lisinopril (ZESTRIL) 5 MG tablet; Take 1 tablet (5 mg) by mouth daily            BMI:   Estimated body mass index is 30.41 kg/m  as calculated from the following:    Height as of this encounter: 1.676 m (5' 6\").    Weight as of this encounter: 85.5 kg (188 lb 6.4 oz).   Weight management plan: Discussed healthy diet and exercise guidelines        No follow-ups on file.      Subjective   Nikiaarvenkatesh Miller 68 year old who presents for the following health issues           HPI       Diabetes Follow-up    How often " are you checking your blood sugar? One time daily  What time of day are you checking your blood sugars (select all that apply)?  Before meals  Have you had any blood sugars above 200?  No- range 130-140  Have you had any blood sugars below 70?  No  What symptoms do you notice when your blood sugar is low?  None  What concerns do you have today about your diabetes? None   Do you have any of these symptoms? (Select all that apply)  No numbness or tingling in feet.  No redness, sores or blisters on feet.  No complaints of excessive thirst.  No reports of blurry vision.  No significant changes to weight.  Have you had a diabetic eye exam in the last 12 months? No    Hyperlipidemia Follow-Up    Are you regularly taking any medication or supplement to lower your cholesterol?   Yes- but quit non compliant   Are you having muscle aches or other side effects that you think could be caused by your cholesterol lowering medication?  No    LDL Cholesterol Calculated   Date Value Ref Range Status   12/13/2022 100 <=100 mg/dL Final   04/23/2021 106 (H) <100 mg/dL Final     Comment:     Above desirable:  100-129 mg/dl  Borderline High:  130-159 mg/dL  High:             160-189 mg/dL  Very high:       >189 mg/dl        Hypertension Follow-up    Do you check your blood pressure regularly outside of the clinic? Yes   Are you following a low salt diet? Yes  Are your blood pressures ever more than 140 on the top number (systolic) OR more   than 90 on the bottom number (diastolic), for example 140/90? No    BP Readings from Last 2 Encounters:   10/09/23 104/60   05/15/23 136/62     Hemoglobin A1C (%)   Date Value   10/09/2023 6.8 (H)   05/15/2023 6.8 (H)   06/11/2021 6.4 (H)   04/23/2021 6.5 (H)     LDL Cholesterol Calculated (mg/dL)   Date Value   12/13/2022 100   01/04/2022 116   04/23/2021 106 (H)   07/03/2019 82           Patient Active Problem List   Diagnosis    Chronic pain of left ankle    Type 2 diabetes mellitus without  complication, without long-term current use of insulin (H)    Hypertension, unspecified type    Benign prostatic hyperplasia with urinary obstruction    Dyslipidemia    Primary osteoarthritis of both knees    Achilles tendinosis of left lower extremity    Cognitive impairment    Vitamin D insufficiency    Bilateral cataracts    Skin cyst    Increased frequency of urination        Current Outpatient Medications   Medication    ACCU-CHEK GUIDE test strip    acetaminophen (TYLENOL) 650 MG CR tablet    alcohol swab prep pads    Alcohol Swabs (ALCOHOL WIPES) 70 % PADS    blood glucose (NO BRAND SPECIFIED) test strip    blood glucose (NO BRAND SPECIFIED) test strip    blood glucose (ONETOUCH VERIO IQ) test strip    blood glucose calibration (ONETOUCH VERIO) solution    blood glucose monitoring (ONETOUCH VERIO IQ SYSTEM) meter device kit    cholecalciferol (VITAMIN D3) 125 mcg (5000 units) capsule    ciclopirox (PENLAC) 8 % external solution    cyanocobalamin (VITAMIN B-12) 1000 MCG tablet    gatifloxacin (ZYMAXID) 0.5 % ophthalmic solution    glimepiride (AMARYL) 2 MG tablet    imiquimod (ALDARA) 5 % external cream    ketoconazole (NIZORAL) 2 % external shampoo    ketorolac (ACULAR) 0.5 % ophthalmic solution    lisinopril (ZESTRIL) 5 MG tablet    metFORMIN (GLUCOPHAGE) 500 MG tablet    methyl salicylate-menthol (ICY HOT) ointment    prednisoLONE acetate (PRED FORTE) 1 % ophthalmic suspension    RESTASIS 0.05 % ophthalmic emulsion    rosuvastatin (CRESTOR) 10 MG tablet    tamsulosin (FLOMAX) 0.4 MG capsule    vitamin D3 (CHOLECALCIFEROL) 50 mcg (2000 units) tablet     No current facility-administered medications for this visit.          Social Determinants of Health     Food Insecurity: Not on file   Depression: Not at risk (5/15/2023)    PHQ-2     PHQ-2 Score: 0   Housing Stability: Not on file   Tobacco Use: Low Risk  (10/9/2023)    Patient History     Smoking Tobacco Use: Never     Smokeless Tobacco Use: Never      "Passive Exposure: Not on file   Financial Resource Strain: Not on file   Alcohol Use: Unknown (7/3/2019)    AUDIT-C     Frequency of Alcohol Consumption: 2-3 times a week     Average Number of Drinks: Not on file     Frequency of Binge Drinking: Not on file   Transportation Needs: Not on file   Physical Activity: Not on file   Interpersonal Safety: Not on file   Stress: Not on file   Social Connections: Not on file        Review of Systems   Constitutional, cardiovascular, pulmonary, GI, , musculoskeletal, neuro, skin, endocrine and psych systems are negative, except as otherwise noted.      Objective      /60 (BP Location: Left arm, Patient Position: Sitting, Cuff Size: Adult Regular)   Pulse 80   Temp 97.3  F (36.3  C) (Temporal)   Resp 12   Ht 1.676 m (5' 6\")   Wt 85.5 kg (188 lb 6.4 oz)   SpO2 98%   BMI 30.41 kg/m       Physical Exam   GENERAL: healthy, alert and no distress  NECK: no adenopathy, no asymmetry, masses, or scars and thyroid normal to palpation  RESP: lungs clear to auscultation - no rales, rhonchi or wheezes  CV: regular rate and rhythm, normal S1 S2, no S3 or S4, no murmur, click or rub, no peripheral edema and peripheral pulses strong  MS: no gross musculoskeletal defects noted, no edema    Mira Han MD   [facility].  [phone]    "

## 2023-10-11 ENCOUNTER — OFFICE VISIT (OUTPATIENT)
Dept: DERMATOLOGY | Facility: CLINIC | Age: 68
End: 2023-10-11
Payer: COMMERCIAL

## 2023-10-11 DIAGNOSIS — L21.9 DERMATITIS, SEBORRHEIC: Primary | ICD-10-CM

## 2023-10-11 DIAGNOSIS — B07.9 VERRUCA VULGARIS: ICD-10-CM

## 2023-10-11 PROCEDURE — 17110 DESTRUCTION B9 LES UP TO 14: CPT | Performed by: STUDENT IN AN ORGANIZED HEALTH CARE EDUCATION/TRAINING PROGRAM

## 2023-10-11 PROCEDURE — 99214 OFFICE O/P EST MOD 30 MIN: CPT | Mod: 25 | Performed by: STUDENT IN AN ORGANIZED HEALTH CARE EDUCATION/TRAINING PROGRAM

## 2023-10-11 RX ORDER — IMIQUIMOD 12.5 MG/.25G
CREAM TOPICAL
Qty: 8 PACKET | Refills: 4 | Status: SHIPPED | OUTPATIENT
Start: 2023-10-11

## 2023-10-11 RX ORDER — KETOCONAZOLE 20 MG/ML
SHAMPOO TOPICAL
Qty: 120 ML | Refills: 11 | Status: SHIPPED | OUTPATIENT
Start: 2023-10-11

## 2023-10-11 NOTE — PATIENT INSTRUCTIONS
Refill imiquimod apply nightly and cover with a band aid, wash off in the morning      ketoconazole

## 2023-10-11 NOTE — LETTER
10/11/2023         RE: Jordan Miller  [address] [place] [address]        Dear Colleague,    Thank you for referring your patient, Jordan Miller, to the [facility]. Please see a copy of my visit note below.    [facility] Dermatology Note    Encounter Date: Oct 11, 2023    Dermatology Problem List:  -  ______________________________________    Impression/Plan:  Jordan was seen today for derm problem.    Diagnoses and all orders for this visit:    Dermatitis, seborrheic  -     ketoconazole (NIZORAL) 2 % external shampoo; Wet affected area daily, apply shampoo and lather, let sit for 3-5 minutes and then rinse.  - continue above    Verruca vulgaris  -     imiquimod (ALDARA) 5 % external cream; Apply imiquimod to warts and cover with bandaid. Repeat nightly until resolved. Pause treatment if the area becomes very inflamed, resume when things are more calm  -     DESTRUCT BENIGN LESION, UP TO 14  -Did not refill his imiquimod, recommend that he do that  - Reviewed that he should apply the imiquimod to the warts covered with a Band-Aid wear overnight and then wash off in the morning  - LN2 to 5 warts on right hand      Cryotherapy procedure note: After verbal consent and discussion of risks and benefits including but no limited to dyspigmentation/scar, blister, and pain, 5 warts R hand was(were) treated with 1-2mm freeze border for 2 cycles with liquid nitrogen. Post cryotherapy instructions were provided.     Follow-up in 1 mo.       Staff Involved:  Staff Only    Jassi Zayas MD   of Dermatology  Department of Dermatology  [facility]      CC:   Chief Complaint   Patient presents with     Derm Problem     Follow up wart        History of Present Illness:  Mr. Jordan Miller is a 68 year old male who presents as a return patient.    Last seen on August 28 for the treatment of warts.  At the time when had  resolved however he had developed 2 new ones.  We repeated freezing and recommended that he apply imiquimod under occlusion.  The warts were primarily confined to his left hand    Labs:      Physical exam:  Vitals: There were no vitals taken for this visit.  GEN: well developed, well-nourished, in no acute distress, in a pleasant mood.     SKIN: Crane phototype   - Focused examination of the face, scalp and hands was performed.  - verrucous papules on hands  - greasy scaling of scalp and NLF  - No other lesions of concern on areas examined.     Past Medical History:   Past Medical History:   Diagnosis Date     BPH (benign prostatic hyperplasia)      Hyperlipidemia      Hypertension      Left ankle pain      Type 2 diabetes mellitus (H)      Past Surgical History:   Procedure Laterality Date     CHOLECYSTECTOMY      1994     HERNIA REPAIR, UMBILICAL      ?2013       Social History:   reports that he has never smoked. He has never used smokeless tobacco. He reports current alcohol use. He reports that he does not use drugs.    Family History:  History reviewed. No pertinent family history.    Medications:  Current Outpatient Medications   Medication Sig Dispense Refill     ACCU-CHEK GUIDE test strip TEST TWICE A  strip 0     acetaminophen (TYLENOL) 650 MG CR tablet Take 1 tablet (650 mg) by mouth every 8 hours as needed for mild pain or fever 90 tablet 3     alcohol swab prep pads Use to swab area of injection/loraine as directed. 100 each 3     Alcohol Swabs (ALCOHOL WIPES) 70 % PADS USE AS DIRECTED TO TEST BLOOD SUGARS 100 each 3     blood glucose (NO BRAND SPECIFIED) test strip Accu-Chek Guide test strips   TEST TWICE A DAY       blood glucose (NO BRAND SPECIFIED) test strip 1 each by Other route       blood glucose (ONETOUCH VERIO IQ) test strip Use to test blood sugar1  times daily. 100 strip 6     blood glucose calibration (ONETOUCH VERIO) solution Use to calibrate blood glucose monitor as directed. 1  each 1     blood glucose monitoring (ONETOUCH VERIO IQ SYSTEM) meter device kit Use to test blood sugar 1 times daily. 1 kit 0     cholecalciferol (VITAMIN D3) 125 mcg (5000 units) capsule        ciclopirox (PENLAC) 8 % external solution Apply to adjacent skin and affected nails daily.  Remove with alcohol every 7 days, then repeat. 6.6 mL 11     cyanocobalamin (VITAMIN B-12) 1000 MCG tablet Take 1 tablet (1,000 mcg) by mouth every other day 90 tablet 2     gatifloxacin (ZYMAXID) 0.5 % ophthalmic solution PLEASE SEE ATTACHED FOR DETAILED DIRECTIONS       glimepiride (AMARYL) 1 MG tablet Take 1 tablet (1 mg) by mouth every morning (before breakfast) for 180 days 90 tablet 1     imiquimod (ALDARA) 5 % external cream Apply imiquimod to warts and cover with bandaid. Repeat nightly until resolved. Pause treatment if the area becomes very inflamed, resume when things are more calm 8 packet 4     ketoconazole (NIZORAL) 2 % external shampoo Wet affected area daily, apply shampoo and lather, let sit for 3-5 minutes and then rinse. 120 mL 11     ketorolac (ACULAR) 0.5 % ophthalmic solution PLEASE SEE ATTACHED FOR DETAILED DIRECTIONS       lisinopril (ZESTRIL) 5 MG tablet Take 1 tablet (5 mg) by mouth daily 90 tablet 3     metFORMIN (GLUCOPHAGE) 500 MG tablet Take 1 tablet (500 mg) by mouth 2 times daily (with meals) 180 tablet 3     methyl salicylate-menthol (ICY HOT) ointment Apply topically every 6 hours as needed (arthirist pain) 99.2 g 1     prednisoLONE acetate (PRED FORTE) 1 % ophthalmic suspension PLEASE SEE ATTACHED FOR DETAILED DIRECTIONS       RESTASIS 0.05 % ophthalmic emulsion        rosuvastatin (CRESTOR) 10 MG tablet Take 1 tablet (10 mg) by mouth daily 90 tablet 3     tamsulosin (FLOMAX) 0.4 MG capsule Take 1 capsule (0.4 mg) by mouth daily 90 capsule 4     vitamin D3 (CHOLECALCIFEROL) 50 mcg (2000 units) tablet Take 2 tablets (100 mcg) by mouth daily 180 tablet 2     Allergies   Allergen Reactions     No Known  Allergies                Again, thank you for allowing me to participate in the care of your patient.        Sincerely,        Jassi Zayas MD

## 2023-10-11 NOTE — PROGRESS NOTES
[facility] Dermatology Note    Encounter Date: Oct 11, 2023    Dermatology Problem List:  -  ______________________________________    Impression/Plan:  Jordan was seen today for derm problem.    Diagnoses and all orders for this visit:    Dermatitis, seborrheic  -     ketoconazole (NIZORAL) 2 % external shampoo; Wet affected area daily, apply shampoo and lather, let sit for 3-5 minutes and then rinse.  - continue above    Verruca vulgaris  -     imiquimod (ALDARA) 5 % external cream; Apply imiquimod to warts and cover with bandaid. Repeat nightly until resolved. Pause treatment if the area becomes very inflamed, resume when things are more calm  -     DESTRUCT BENIGN LESION, UP TO 14  -Did not refill his imiquimod, recommend that he do that  - Reviewed that he should apply the imiquimod to the warts covered with a Band-Aid wear overnight and then wash off in the morning  - LN2 to 5 warts on right hand      Cryotherapy procedure note: After verbal consent and discussion of risks and benefits including but no limited to dyspigmentation/scar, blister, and pain, 5 warts R hand was(were) treated with 1-2mm freeze border for 2 cycles with liquid nitrogen. Post cryotherapy instructions were provided.     Follow-up in 1 mo.       Staff Involved:  Staff Only    Jassi Zayas MD   of Dermatology  Department of Dermatology  [facility]      CC:   Chief Complaint   Patient presents with    Derm Problem     Follow up wart        History of Present Illness:  Mr. Jordan Miller is a 68 year old male who presents as a return patient.    Last seen on August 28 for the treatment of warts.  At the time when had resolved however he had developed 2 new ones.  We repeated freezing and recommended that he apply imiquimod under occlusion.  The warts were primarily confined to his left hand    Labs:      Physical exam:  Vitals: There were no vitals taken for this  visit.  GEN: well developed, well-nourished, in no acute distress, in a pleasant mood.     SKIN: Crane phototype   - Focused examination of the face, scalp and hands was performed.  - verrucous papules on hands  - greasy scaling of scalp and NLF  - No other lesions of concern on areas examined.     Past Medical History:   Past Medical History:   Diagnosis Date    BPH (benign prostatic hyperplasia)     Hyperlipidemia     Hypertension     Left ankle pain     Type 2 diabetes mellitus (H)      Past Surgical History:   Procedure Laterality Date    CHOLECYSTECTOMY      1994    HERNIA REPAIR, UMBILICAL      ?2013       Social History:   reports that he has never smoked. He has never used smokeless tobacco. He reports current alcohol use. He reports that he does not use drugs.    Family History:  History reviewed. No pertinent family history.    Medications:  Current Outpatient Medications   Medication Sig Dispense Refill    ACCU-CHEK GUIDE test strip TEST TWICE A  strip 0    acetaminophen (TYLENOL) 650 MG CR tablet Take 1 tablet (650 mg) by mouth every 8 hours as needed for mild pain or fever 90 tablet 3    alcohol swab prep pads Use to swab area of injection/loraine as directed. 100 each 3    Alcohol Swabs (ALCOHOL WIPES) 70 % PADS USE AS DIRECTED TO TEST BLOOD SUGARS 100 each 3    blood glucose (NO BRAND SPECIFIED) test strip Accu-Chek Guide test strips   TEST TWICE A DAY      blood glucose (NO BRAND SPECIFIED) test strip 1 each by Other route      blood glucose (ONETOUCH VERIO IQ) test strip Use to test blood sugar1  times daily. 100 strip 6    blood glucose calibration (ONETOUCH VERIO) solution Use to calibrate blood glucose monitor as directed. 1 each 1    blood glucose monitoring (ONETOUCH VERIO IQ SYSTEM) meter device kit Use to test blood sugar 1 times daily. 1 kit 0    cholecalciferol (VITAMIN D3) 125 mcg (5000 units) capsule       ciclopirox (PENLAC) 8 % external solution Apply to adjacent skin and  affected nails daily.  Remove with alcohol every 7 days, then repeat. 6.6 mL 11    cyanocobalamin (VITAMIN B-12) 1000 MCG tablet Take 1 tablet (1,000 mcg) by mouth every other day 90 tablet 2    gatifloxacin (ZYMAXID) 0.5 % ophthalmic solution PLEASE SEE ATTACHED FOR DETAILED DIRECTIONS      glimepiride (AMARYL) 1 MG tablet Take 1 tablet (1 mg) by mouth every morning (before breakfast) for 180 days 90 tablet 1    imiquimod (ALDARA) 5 % external cream Apply imiquimod to warts and cover with bandaid. Repeat nightly until resolved. Pause treatment if the area becomes very inflamed, resume when things are more calm 8 packet 4    ketoconazole (NIZORAL) 2 % external shampoo Wet affected area daily, apply shampoo and lather, let sit for 3-5 minutes and then rinse. 120 mL 11    ketorolac (ACULAR) 0.5 % ophthalmic solution PLEASE SEE ATTACHED FOR DETAILED DIRECTIONS      lisinopril (ZESTRIL) 5 MG tablet Take 1 tablet (5 mg) by mouth daily 90 tablet 3    metFORMIN (GLUCOPHAGE) 500 MG tablet Take 1 tablet (500 mg) by mouth 2 times daily (with meals) 180 tablet 3    methyl salicylate-menthol (ICY HOT) ointment Apply topically every 6 hours as needed (arthirist pain) 99.2 g 1    prednisoLONE acetate (PRED FORTE) 1 % ophthalmic suspension PLEASE SEE ATTACHED FOR DETAILED DIRECTIONS      RESTASIS 0.05 % ophthalmic emulsion       rosuvastatin (CRESTOR) 10 MG tablet Take 1 tablet (10 mg) by mouth daily 90 tablet 3    tamsulosin (FLOMAX) 0.4 MG capsule Take 1 capsule (0.4 mg) by mouth daily 90 capsule 4    vitamin D3 (CHOLECALCIFEROL) 50 mcg (2000 units) tablet Take 2 tablets (100 mcg) by mouth daily 180 tablet 2     Allergies   Allergen Reactions    No Known Allergies

## 2023-11-13 ENCOUNTER — PATIENT OUTREACH (OUTPATIENT)
Dept: CARE COORDINATION | Facility: CLINIC | Age: 68
End: 2023-11-13
Payer: COMMERCIAL

## 2023-11-27 ENCOUNTER — PATIENT OUTREACH (OUTPATIENT)
Dept: CARE COORDINATION | Facility: CLINIC | Age: 68
End: 2023-11-27
Payer: COMMERCIAL

## 2023-11-28 ENCOUNTER — OFFICE VISIT (OUTPATIENT)
Dept: FAMILY MEDICINE | Facility: CLINIC | Age: 68
End: 2023-11-28
Payer: COMMERCIAL

## 2023-11-28 VITALS
WEIGHT: 191 LBS | HEART RATE: 60 BPM | RESPIRATION RATE: 12 BRPM | DIASTOLIC BLOOD PRESSURE: 70 MMHG | BODY MASS INDEX: 30.7 KG/M2 | TEMPERATURE: 97.5 F | OXYGEN SATURATION: 99 % | HEIGHT: 66 IN | SYSTOLIC BLOOD PRESSURE: 138 MMHG

## 2023-11-28 DIAGNOSIS — Z23 NEED FOR TDAP VACCINATION: ICD-10-CM

## 2023-11-28 DIAGNOSIS — N40.1 BENIGN PROSTATIC HYPERPLASIA WITH URINARY OBSTRUCTION: ICD-10-CM

## 2023-11-28 DIAGNOSIS — I10 HYPERTENSION, UNSPECIFIED TYPE: ICD-10-CM

## 2023-11-28 DIAGNOSIS — Z29.11 NEED FOR VACCINATION AGAINST RESPIRATORY SYNCYTIAL VIRUS: ICD-10-CM

## 2023-11-28 DIAGNOSIS — Z87.828 HISTORY OF DISLOCATION OF ELBOW: ICD-10-CM

## 2023-11-28 DIAGNOSIS — E11.9 TYPE 2 DIABETES MELLITUS WITHOUT COMPLICATION, WITHOUT LONG-TERM CURRENT USE OF INSULIN (H): Primary | ICD-10-CM

## 2023-11-28 DIAGNOSIS — G89.29 CHRONIC RIGHT SHOULDER PAIN: ICD-10-CM

## 2023-11-28 DIAGNOSIS — N13.8 BENIGN PROSTATIC HYPERPLASIA WITH URINARY OBSTRUCTION: ICD-10-CM

## 2023-11-28 DIAGNOSIS — E11.9 TYPE 2 DIABETES MELLITUS WITHOUT COMPLICATION, WITHOUT LONG-TERM CURRENT USE OF INSULIN (H): ICD-10-CM

## 2023-11-28 DIAGNOSIS — M25.511 CHRONIC RIGHT SHOULDER PAIN: ICD-10-CM

## 2023-11-28 LAB
ANION GAP SERPL CALCULATED.3IONS-SCNC: 9 MMOL/L (ref 7–15)
BUN SERPL-MCNC: 14.9 MG/DL (ref 8–23)
CALCIUM SERPL-MCNC: 9.2 MG/DL (ref 8.8–10.2)
CHLORIDE SERPL-SCNC: 104 MMOL/L (ref 98–107)
CHOLEST SERPL-MCNC: 183 MG/DL
CREAT SERPL-MCNC: 1.02 MG/DL (ref 0.67–1.17)
CREAT UR-MCNC: 16.9 MG/DL
DEPRECATED HCO3 PLAS-SCNC: 23 MMOL/L (ref 22–29)
EGFRCR SERPLBLD CKD-EPI 2021: 81 ML/MIN/1.73M2
GLUCOSE SERPL-MCNC: 112 MG/DL (ref 70–99)
HBA1C MFR BLD: 6.2 % (ref 0–5.6)
HDLC SERPL-MCNC: 63 MG/DL
LDLC SERPL CALC-MCNC: 104 MG/DL
MICROALBUMIN UR-MCNC: <12 MG/L
MICROALBUMIN/CREAT UR: NORMAL MG/G{CREAT}
NONHDLC SERPL-MCNC: 120 MG/DL
POTASSIUM SERPL-SCNC: 4.4 MMOL/L (ref 3.4–5.3)
SODIUM SERPL-SCNC: 136 MMOL/L (ref 135–145)
TRIGL SERPL-MCNC: 78 MG/DL

## 2023-11-28 PROCEDURE — 82043 UR ALBUMIN QUANTITATIVE: CPT | Performed by: FAMILY MEDICINE

## 2023-11-28 PROCEDURE — 83036 HEMOGLOBIN GLYCOSYLATED A1C: CPT | Performed by: FAMILY MEDICINE

## 2023-11-28 PROCEDURE — 80048 BASIC METABOLIC PNL TOTAL CA: CPT | Performed by: FAMILY MEDICINE

## 2023-11-28 PROCEDURE — 99213 OFFICE O/P EST LOW 20 MIN: CPT | Performed by: FAMILY MEDICINE

## 2023-11-28 PROCEDURE — 36415 COLL VENOUS BLD VENIPUNCTURE: CPT | Performed by: FAMILY MEDICINE

## 2023-11-28 PROCEDURE — 80061 LIPID PANEL: CPT | Performed by: FAMILY MEDICINE

## 2023-11-28 PROCEDURE — 82570 ASSAY OF URINE CREATININE: CPT | Performed by: FAMILY MEDICINE

## 2023-11-28 RX ORDER — PROCHLORPERAZINE 25 MG/1
1 SUPPOSITORY RECTAL
Qty: 3 EACH | Refills: 5 | Status: SHIPPED | OUTPATIENT
Start: 2023-11-28 | End: 2023-11-29

## 2023-11-28 RX ORDER — PROCHLORPERAZINE 25 MG/1
1 SUPPOSITORY RECTAL ONCE
Qty: 1 EACH | Refills: 0 | Status: SHIPPED | OUTPATIENT
Start: 2023-11-28 | End: 2023-11-28

## 2023-11-28 RX ORDER — GLIMEPIRIDE 1 MG/1
1 TABLET ORAL
Qty: 90 TABLET | Refills: 1 | Status: SHIPPED | OUTPATIENT
Start: 2023-11-28 | End: 2024-08-19

## 2023-11-28 RX ORDER — BLOOD SUGAR DIAGNOSTIC
STRIP MISCELLANEOUS
Qty: 100 STRIP | Refills: 6 | Status: SHIPPED | OUTPATIENT
Start: 2023-11-28 | End: 2023-11-29

## 2023-11-28 RX ORDER — PROCHLORPERAZINE 25 MG/1
1 SUPPOSITORY RECTAL
Qty: 1 EACH | Refills: 1 | Status: SHIPPED | OUTPATIENT
Start: 2023-11-28 | End: 2023-11-29

## 2023-11-28 RX ORDER — LISINOPRIL 5 MG/1
5 TABLET ORAL DAILY
Qty: 90 TABLET | Refills: 3 | Status: SHIPPED | OUTPATIENT
Start: 2023-11-28

## 2023-11-28 RX ORDER — TAMSULOSIN HYDROCHLORIDE 0.4 MG/1
0.4 CAPSULE ORAL 2 TIMES DAILY
Qty: 90 CAPSULE | Refills: 0 | Status: SHIPPED | OUTPATIENT
Start: 2023-11-28 | End: 2024-01-08

## 2023-11-28 RX ORDER — RESPIRATORY SYNCYTIAL VIRUS VACCINE 120MCG/0.5
0.5 KIT INTRAMUSCULAR ONCE
Qty: 1 EACH | Refills: 0 | Status: SHIPPED | OUTPATIENT
Start: 2023-11-28 | End: 2023-11-28

## 2023-11-28 RX ORDER — CHOLECALCIFEROL (VITAMIN D3) 50 MCG
1 TABLET ORAL DAILY
Qty: 90 TABLET | Refills: 1 | Status: SHIPPED | OUTPATIENT
Start: 2023-11-28 | End: 2024-09-19

## 2023-11-28 NOTE — PROGRESS NOTES
Assessment & Plan     Patient presents with:  Diabetes: Follow up. Wondering about dexcom.  Musculoskeletal Problem: Right arm pain, dull ache, can not sleep on that side       Jordan was seen today for diabetes and musculoskeletal problem.    Diagnoses and all orders for this visit:    Type 2 diabetes mellitus without complication, without long-term current use of insulin (H)  Comments:  like to try dexacome as like to avoid daily BID blood sugar checks,FBS<100  Orders:  -     Adult Eye  Referral; Future  -     Lipid panel reflex to direct LDL Non-fasting; Future  -     Albumin Random Urine Quantitative with Creat Ratio; Future  -     blood glucose (ONETOUCH VERIO IQ) test strip; Use to test blood sugar1  times daily.  -     Lipid panel reflex to direct LDL Non-fasting  -     Hemoglobin A1c; Future  -     Hemoglobin A1c  -     Albumin Random Urine Quantitative with Creat Ratio  -     Continuous Blood Gluc  (DEXCOM G6 ) HOSEA; 1 each once for 1 dose Use to read blood sugars as per 's instructions.  -     Continuous Blood Gluc Transmit (DEXCOM G6 TRANSMITTER) MISC; 1 each every 3 months Change every 3 months.  -     Continuous Blood Gluc Sensor (DEXCOM G6 SENSOR) MISC; 1 each every 10 days Change every 10 days.  -     metFORMIN (GLUCOPHAGE) 500 MG tablet; Take 1 tablet (500 mg) by mouth daily (with breakfast)  -     vitamin D3 (CHOLECALCIFEROL) 50 mcg (2000 units) tablet; Take 1 tablet (50 mcg) by mouth daily    Hypertension, unspecified type  -     BASIC METABOLIC PANEL; Future  -     lisinopril (ZESTRIL) 5 MG tablet; Take 1 tablet (5 mg) by mouth daily  -     BASIC METABOLIC PANEL  -     glimepiride (AMARYL) 1 MG tablet; Take 1 tablet (1 mg) by mouth every morning (before breakfast)    Need for Tdap vaccination  -     Tdap, tetanus-diptheria-acell pertussis, (BOOSTRIX) 5-2.5-18.5 LF-MCG/0.5 VINCENT injection; Inject 0.5 mLs into the muscle once for 1 dose    Need for vaccination  against respiratory syncytial virus  -     respiratory syncytial virus vaccine, bivalent (ABRYSVO) injection; Inject 0.5 mLs into the muscle once for 1 dose    Benign prostatic hyperplasia with urinary obstruction  -     tamsulosin (FLOMAX) 0.4 MG capsule; Take 1 capsule (0.4 mg) by mouth 2 times daily    Type 2 diabetes mellitus without complication, without long-term current use of insulin (H)  Comments:  stable   Orders:  -     Adult Eye  Referral; Future  -     Lipid panel reflex to direct LDL Non-fasting; Future  -     Albumin Random Urine Quantitative with Creat Ratio; Future  -     blood glucose (ONETOUCH VERIO IQ) test strip; Use to test blood sugar1  times daily.  -     Lipid panel reflex to direct LDL Non-fasting  -     Hemoglobin A1c; Future  -     Hemoglobin A1c  -     Albumin Random Urine Quantitative with Creat Ratio  -     Continuous Blood Gluc  (DEXCOM G6 ) HOSEA; 1 each once for 1 dose Use to read blood sugars as per 's instructions.  -     Continuous Blood Gluc Transmit (DEXCOM G6 TRANSMITTER) MISC; 1 each every 3 months Change every 3 months.  -     Continuous Blood Gluc Sensor (DEXCOM G6 SENSOR) MISC; 1 each every 10 days Change every 10 days.  -     metFORMIN (GLUCOPHAGE) 500 MG tablet; Take 1 tablet (500 mg) by mouth daily (with breakfast)  -     vitamin D3 (CHOLECALCIFEROL) 50 mcg (2000 units) tablet; Take 1 tablet (50 mcg) by mouth daily    Hypertension, unspecified type  Comments:  Was taking lisinopril twice a day advised patient to just take once daily 5 mg dose blood pressure is slightly on the lower side today  Orders:  -     BASIC METABOLIC PANEL; Future  -     lisinopril (ZESTRIL) 5 MG tablet; Take 1 tablet (5 mg) by mouth daily  -     BASIC METABOLIC PANEL  -     glimepiride (AMARYL) 1 MG tablet; Take 1 tablet (1 mg) by mouth every morning (before breakfast)    History of dislocation of elbow    Chronic right shoulder pain  Comments:  ROM normal .  "more likley strain from weight lifting , adv icing/ heat . adm to limit heavy lifting  Orders:  -     vitamin D3 (CHOLECALCIFEROL) 50 mcg (2000 units) tablet; Take 1 tablet (50 mcg) by mouth daily    Other orders  -     PRIMARY CARE FOLLOW-UP SCHEDULING; Future            BMI:   Estimated body mass index is 30.83 kg/m  as calculated from the following:    Height as of this encounter: 1.676 m (5' 6\").    Weight as of this encounter: 86.6 kg (191 lb).   Weight management plan: Discussed healthy diet and exercise guidelines        No follow-ups on file.   Follow-up Visit   Expected date:  Dec 28, 2023 (Approximate)      Follow Up Appointment Details:     Follow-up with whom?: Me    Follow-Up for what?: Medicare Wellness    Welcome or Annual?: Annual Wellness    How?: In Person    Is this an as-needed follow-up?: Yes                       Subjective   Jordan Miller 68 year old who presents for the following health issues           HPI   Diabetes Visit (Submitted on 11/28/2023)  Chief Complaint: Chronic problems general questions HPI Form  Frequency of checking blood sugars:: two times daily  What time of day are you checking your blood sugars : before meals, at bedtime  Have you had any blood sugars above 200?: No  Have you had any blood sugars below 70?: No  Hypoglycemia symptoms:: none  Diabetic concerns:: none  Paraesthesia present:: none of these symptoms  Have you had a diabetic eye exam within the last year?: Yes  Date of last eye exam: : does not remember  Where was this eye exam done?: Spring 2023  General Questionnaire (Submitted on 11/28/2023)  Chief Complaint: Chronic problems general questions HPI Form  How many servings of fruits and vegetables do you eat daily?: 2-3  On average, how many sweetened beverages do you drink each day (Examples: soda, juice, sweet tea, etc.  Do NOT count diet or artificially sweetened beverages)?: 2  How many minutes a day do you exercise enough to make your heart beat faster?: 60 " or more  How many days a week do you exercise enough to make your heart beat faster?: 7  How many days per week do you miss taking your medication?: 0    Diabetes Follow-up    How often are you checking your blood sugar? One time daily  What time of day are you checking your blood sugars (select all that apply)?  Before meals  Have you had any blood sugars above 200?  No- range 130-140  Have you had any blood sugars below 70?  No  What symptoms do you notice when your blood sugar is low?  None  What concerns do you have today about your diabetes? None   Do you have any of these symptoms? (Select all that apply)  No numbness or tingling in feet.  No redness, sores or blisters on feet.  No complaints of excessive thirst.  No reports of blurry vision.  No significant changes to weight.  Have you had a diabetic eye exam in the last 12 months? No    Hyperlipidemia Follow-Up    Are you regularly taking any medication or supplement to lower your cholesterol?   Yes- but quit non compliant   Are you having muscle aches or other side effects that you think could be caused by your cholesterol lowering medication?  No    LDL Cholesterol Calculated   Date Value Ref Range Status   12/13/2022 100 <=100 mg/dL Final   04/23/2021 106 (H) <100 mg/dL Final     Comment:     Above desirable:  100-129 mg/dl  Borderline High:  130-159 mg/dL  High:             160-189 mg/dL  Very high:       >189 mg/dl        Hypertension Follow-up    Do you check your blood pressure regularly outside of the clinic? Yes   Are you following a low salt diet? Yes  Are your blood pressures ever more than 140 on the top number (systolic) OR more   than 90 on the bottom number (diastolic), for example 140/90? No    BP Readings from Last 2 Encounters:   11/28/23 138/70   10/09/23 104/60     Hemoglobin A1C (%)   Date Value   11/28/2023 6.2 (H)   10/09/2023 6.8 (H)   06/11/2021 6.4 (H)   04/23/2021 6.5 (H)     LDL Cholesterol Calculated (mg/dL)   Date Value    12/13/2022 100   01/04/2022 116   04/23/2021 106 (H)   07/03/2019 82           Patient Active Problem List   Diagnosis    Chronic pain of left ankle    Type 2 diabetes mellitus without complication, without long-term current use of insulin (H)    Hypertension, unspecified type    Benign prostatic hyperplasia with urinary obstruction    Dyslipidemia    Primary osteoarthritis of both knees    Achilles tendinosis of left lower extremity    Cognitive impairment    Vitamin D insufficiency    Bilateral cataracts    Skin cyst    Increased frequency of urination    History of dislocation of elbow        Current Outpatient Medications   Medication    ACCU-CHEK GUIDE test strip    acetaminophen (TYLENOL) 650 MG CR tablet    alcohol swab prep pads    Alcohol Swabs (ALCOHOL WIPES) 70 % PADS    blood glucose (NO BRAND SPECIFIED) test strip    blood glucose (NO BRAND SPECIFIED) test strip    blood glucose (ONETOUCH VERIO IQ) test strip    blood glucose calibration (ONETOUCH VERIO) solution    blood glucose monitoring (ONETOUCH VERIO IQ SYSTEM) meter device kit    cholecalciferol (VITAMIN D3) 125 mcg (5000 units) capsule    ciclopirox (PENLAC) 8 % external solution    Continuous Blood Gluc  (DEXCOM G6 ) HOSEA    Continuous Blood Gluc Sensor (DEXCOM G6 SENSOR) MISC    Continuous Blood Gluc Transmit (DEXCOM G6 TRANSMITTER) MISC    cyanocobalamin (VITAMIN B-12) 1000 MCG tablet    gatifloxacin (ZYMAXID) 0.5 % ophthalmic solution    glimepiride (AMARYL) 1 MG tablet    imiquimod (ALDARA) 5 % external cream    ketoconazole (NIZORAL) 2 % external shampoo    ketorolac (ACULAR) 0.5 % ophthalmic solution    lisinopril (ZESTRIL) 5 MG tablet    metFORMIN (GLUCOPHAGE) 500 MG tablet    methyl salicylate-menthol (ICY HOT) ointment    prednisoLONE acetate (PRED FORTE) 1 % ophthalmic suspension    respiratory syncytial virus vaccine, bivalent (ABRYSVO) injection    RESTASIS 0.05 % ophthalmic emulsion    rosuvastatin (CRESTOR) 10 MG  tablet    tamsulosin (FLOMAX) 0.4 MG capsule    Tdap, tetanus-diptheria-acell pertussis, (BOOSTRIX) 5-2.5-18.5 LF-MCG/0.5 VINCENT injection    vitamin D3 (CHOLECALCIFEROL) 50 mcg (2000 units) tablet    vitamin D3 (CHOLECALCIFEROL) 50 mcg (2000 units) tablet     No current facility-administered medications for this visit.          Social Determinants of Health     Food Insecurity: Low Risk  (11/28/2023)    Food Insecurity     Within the past 12 months, did you worry that your food would run out before you got money to buy more?: No     Within the past 12 months, did the food you bought just not last and you didn t have money to get more?: No   Depression: Not at risk (5/15/2023)    PHQ-2     PHQ-2 Score: 0   Housing Stability: High Risk (11/28/2023)    Housing Stability     Do you have housing? : No     Are you worried about losing your housing?: No   Tobacco Use: Low Risk  (11/28/2023)    Patient History     Smoking Tobacco Use: Never     Smokeless Tobacco Use: Never     Passive Exposure: Not on file   Financial Resource Strain: Low Risk  (11/28/2023)    Financial Resource Strain     Within the past 12 months, have you or your family members you live with been unable to get utilities (heat, electricity) when it was really needed?: No   Alcohol Use: Unknown (7/3/2019)    AUDIT-C     Frequency of Alcohol Consumption: 2-3 times a week     Average Number of Drinks: Not on file     Frequency of Binge Drinking: Not on file   Transportation Needs: Low Risk  (11/28/2023)    Transportation Needs     Within the past 12 months, has lack of transportation kept you from medical appointments, getting your medicines, non-medical meetings or appointments, work, or from getting things that you need?: No   Physical Activity: Not on file   Interpersonal Safety: Not on file   Stress: Not on file   Social Connections: Not on file        Review of Systems   Constitutional, cardiovascular, pulmonary, GI, , musculoskeletal, neuro, skin,  "endocrine and psych systems are negative, except as otherwise noted.      Objective      /70 (BP Location: Right arm, Patient Position: Sitting, Cuff Size: Adult Regular)   Pulse 60   Temp 97.5  F (36.4  C) (Temporal)   Resp 12   Ht 1.676 m (5' 6\")   Wt 86.6 kg (191 lb)   SpO2 99%   BMI 30.83 kg/m       Physical Exam   GENERAL: healthy, alert and no distress  NECK: no adenopathy, no asymmetry, masses, or scars and thyroid normal to palpation  RESP: lungs clear to auscultation - no rales, rhonchi or wheezes  CV: regular rate and rhythm, normal S1 S2, no S3 or S4, no murmur, click or rub, no peripheral edema and peripheral pulses strong  MS: no gross musculoskeletal defects noted, no edema    Mira Han MD   [facility].  [phone]    Answers submitted by the patient for this visit:  "

## 2023-11-28 NOTE — COMMUNITY RESOURCES LIST (ENGLISH)
11/28/2023   [facility]  N/A  For additional resource needs, please contact your health insurance member services or your primary care team.  Phone: [phone]   Email: N/A   Address: [address] [place] [address]   Hours: N/A        Hotlines and Helplines       Hotline - Housing crisis  1  [facility] (Main Office) Distance: 17.89 miles      Phone/Virtual   [address] [place] [address]  Language: English  Hours: Mon - Sun Open 24 Hours   Phone: (943) 912-5837 Email: info@[facility] Website: http://[facility].Cazoomi     2  [facility] Distance: 18.08 miles      Phone/Virtual   [address] [place] [address]  Language: English  Hours: Mon - Sun Open 24 Hours   Phone: (761) 608-6217 Email: communications@[facility].org Website: https://[facility]-[place].org/oursaviourshousing/          Housing       Coordinated Entry access point  3  [facility] Distance: 10.22 miles      In-Person, Phone/Virtual   [address] [place] [address]  Language: English, Marshallese  Hours: Mon - Fri 8:30 AM - 4:30 PM  Fees: Free   Phone: (379) 516-9576 Email: info@[facility].org Website: https://www.[facility].org/locations/[facility]-clinic/     4  [facility] ([facility] - Housing Services Distance: 18.23 miles      In-Person   [address] [place] [address]  Language: English  Hours: Mon - Fri 9:00 AM - 5:00 PM  Fees: Free   Phone: (569) 892-4581 Email: housing@[facility].org Website: http://www.[facility].org/housing     Drop-in center or day shelter  5  [facility] Distance: 9.29 miles      In-Person   [address] [place] [address]  Language: English  Hours: Mon - Fri 9:00 AM - 4:00 PM  Fees: Free   Phone: (246) 583-3232 Email: donnell@[employer].org Website: http://listeninghouse.org     6  [facility] Distance: 18.21  miles      In-Person   [address] [place] [address]  Language: English, Tajik, Indonesian  Hours: Mon - Sat 7:00 AM - 3:00 PM  Fees: Free, Self Pay   Phone: (146) 989-8726 Email: info@[employer] Website: https://www.[employer].Ligandal/locations/opportunity-center/     Housing search assistance  7  Affordable Housing Online - https://3scale/ Distance: 18.51 miles      Phone/Virtual   [address] [place] [address]  Language: English  Hours: Mon - Sun Open 24 Hours   Email: info@[employer] Website: https://3scale     8  [employer] - Online housing search assistance Distance: 19.67 miles      Phone/Virtual   [address] [address]  Language: English, Hmong, Tajik, Indonesian  Hours: Mon - Sun Open 24 Hours   Phone: (174) 332-4484 Email: info@housinglink.org Website: http://www.housinglink.org/     Shelter for families  9  [facility] Distance: 18.06 miles      In-Person   [address] [place] [address]  Language: English  Hours: Mon - Fri 3:00 PM - 9:00 AM , Sat - Sun Open 24 Hours  Fees: Free   Phone: (794) 509-5654 Ext.1 Website: https://www.saint[facility]rews.org/2020/07/03/emergency-family-shelter/     10  [facility] Distance: 22.71 miles      In-Person   [address] [place] [address]  Language: English  Hours: Mon - Sun Open 24 Hours  Fees: Free, Self Pay   Phone: (667) 882-4971 Email: Joselin@[facility].org Website: http://www.[facility].org/     Shelter for individuals  [address] [place] [address] - [facility] [place] - Homeless Resources and Housing Information - Emergency housing Distance: 5.82 miles      In-Person, Phone/Virtual   [address] [place] [address]  Language: English  Hours: Mon - Fri 8:00 AM - 4:30 PM  Fees: Free   Phone: (874) 720-2535 Email: pete@co.[facility]. Website:  https://www.co.[place].us/Facilities/Facility/Details/[facility]-22     12  [facility] [place] - [facility] [place] [facility] - [facility] [place] [facility] Distance: 10.3 miles      In-Person   [address] [place] [address]  Language: English  Hours: Mon - Sun 5:00 PM - 10:00 AM  Fees: Free, Self Pay   Phone: (699) 525-8126 Email: info@[employer] Website: https://www.[employer]/locations/[facility]-paul/          Important Numbers & Websites       [address]ited[address].org  Poison Control   (987) 812-4498 [place].org  Suicide and Crisis Lifeline   [address] [place], [place] [address]line.org  Childhelp [place] Child Abuse Hotline   325.962.8208 Childhelphotline.org  [place] Sexual Assault Hotline   (699) 271-1241 (HOPE) Rainn.org  [place] Runaway Safeline   (359) 793-1054 (RUNAWAY) [facility].org  Pregnancy & Postpartum Support [place]   Call/text 978-123-4737 Ppsupportmn.org  Substance Abuse National Helpline ([facility]   565-822-HELP (1846) Findtreatment.gov  Emergency Services   911

## 2023-11-29 ENCOUNTER — PATIENT OUTREACH (OUTPATIENT)
Dept: GERIATRIC MEDICINE | Facility: CLINIC | Age: 68
End: 2023-11-29
Payer: COMMERCIAL

## 2023-11-29 RX ORDER — BLOOD SUGAR DIAGNOSTIC
STRIP MISCELLANEOUS
Qty: 100 STRIP | Refills: 6 | Status: SHIPPED | OUTPATIENT
Start: 2023-11-29 | End: 2023-11-29

## 2023-11-29 RX ORDER — BLOOD SUGAR DIAGNOSTIC
STRIP MISCELLANEOUS
Qty: 100 STRIP | Refills: 6 | Status: SHIPPED | OUTPATIENT
Start: 2023-11-29 | End: 2023-11-30

## 2023-11-29 NOTE — PROGRESS NOTES
[facility] Care Coordination Contact    CC received a phone call from Jordan stating that insurance does not cover Accucheck. CC spoke with pharmacy and stated that OneTouch is covered under insurance.   CC sent message to PCP to send new prescription for OneTouch glucose machine and test strips.     Maricarmen Penny RN, BSN, PHN  [facility]  Phone: [phone].

## 2023-11-30 RX ORDER — BLOOD SUGAR DIAGNOSTIC
STRIP MISCELLANEOUS
Qty: 100 STRIP | Refills: 0 | Status: SHIPPED | OUTPATIENT
Start: 2023-11-30 | End: 2024-03-12

## 2024-01-04 DIAGNOSIS — N13.8 BENIGN PROSTATIC HYPERPLASIA WITH URINARY OBSTRUCTION: ICD-10-CM

## 2024-01-04 DIAGNOSIS — N40.1 BENIGN PROSTATIC HYPERPLASIA WITH URINARY OBSTRUCTION: ICD-10-CM

## 2024-01-04 NOTE — TELEPHONE ENCOUNTER
Patient Request    Patient is requesting a 90 Day Prescription.    Quantity: 180.0    Medication: tamsulosin (FLOMAX) 0.4 MG capsule     Pharmacy:  [id number] - [place], [place] - [address] AT [facility]      Please advise. Thank you!

## 2024-01-05 RX ORDER — TAMSULOSIN HYDROCHLORIDE 0.4 MG/1
0.4 CAPSULE ORAL 2 TIMES DAILY
Qty: 90 CAPSULE | Refills: 0 | OUTPATIENT
Start: 2024-01-05

## 2024-01-08 DIAGNOSIS — N40.1 BENIGN PROSTATIC HYPERPLASIA WITH URINARY OBSTRUCTION: ICD-10-CM

## 2024-01-08 DIAGNOSIS — N13.8 BENIGN PROSTATIC HYPERPLASIA WITH URINARY OBSTRUCTION: ICD-10-CM

## 2024-01-08 RX ORDER — TAMSULOSIN HYDROCHLORIDE 0.4 MG/1
0.4 CAPSULE ORAL 2 TIMES DAILY
Qty: 180 CAPSULE | Refills: 2 | Status: SHIPPED | OUTPATIENT
Start: 2024-01-08

## 2024-01-19 ENCOUNTER — TELEPHONE (OUTPATIENT)
Dept: FAMILY MEDICINE | Facility: CLINIC | Age: 69
End: 2024-01-19

## 2024-01-19 ENCOUNTER — OFFICE VISIT (OUTPATIENT)
Dept: FAMILY MEDICINE | Facility: CLINIC | Age: 69
End: 2024-01-19
Payer: COMMERCIAL

## 2024-01-19 VITALS
BODY MASS INDEX: 30.31 KG/M2 | TEMPERATURE: 97.5 F | SYSTOLIC BLOOD PRESSURE: 100 MMHG | HEIGHT: 66 IN | DIASTOLIC BLOOD PRESSURE: 60 MMHG | HEART RATE: 65 BPM | WEIGHT: 188.6 LBS | OXYGEN SATURATION: 99 %

## 2024-01-19 DIAGNOSIS — N40.1 BENIGN PROSTATIC HYPERPLASIA WITH URINARY OBSTRUCTION: ICD-10-CM

## 2024-01-19 DIAGNOSIS — I10 HYPERTENSION, UNSPECIFIED TYPE: ICD-10-CM

## 2024-01-19 DIAGNOSIS — E11.9 TYPE 2 DIABETES MELLITUS WITHOUT COMPLICATION, WITHOUT LONG-TERM CURRENT USE OF INSULIN (H): ICD-10-CM

## 2024-01-19 DIAGNOSIS — Z12.5 SCREENING FOR PROSTATE CANCER: ICD-10-CM

## 2024-01-19 DIAGNOSIS — N13.8 BENIGN PROSTATIC HYPERPLASIA WITH URINARY OBSTRUCTION: ICD-10-CM

## 2024-01-19 DIAGNOSIS — Z29.11 NEED FOR VACCINATION AGAINST RESPIRATORY SYNCYTIAL VIRUS: ICD-10-CM

## 2024-01-19 DIAGNOSIS — Z23 NEED FOR TDAP VACCINATION: ICD-10-CM

## 2024-01-19 DIAGNOSIS — E78.5 DYSLIPIDEMIA: ICD-10-CM

## 2024-01-19 DIAGNOSIS — Z13.21 ENCOUNTER FOR VITAMIN DEFICIENCY SCREENING: ICD-10-CM

## 2024-01-19 DIAGNOSIS — M17.0 PRIMARY OSTEOARTHRITIS OF BOTH KNEES: ICD-10-CM

## 2024-01-19 DIAGNOSIS — Z00.00 ENCOUNTER FOR MEDICARE ANNUAL WELLNESS EXAM: Primary | ICD-10-CM

## 2024-01-19 PROBLEM — M25.572 CHRONIC PAIN OF LEFT ANKLE: Status: RESOLVED | Noted: 2019-07-03 | Resolved: 2024-01-19

## 2024-01-19 PROBLEM — G89.29 CHRONIC PAIN OF LEFT ANKLE: Status: RESOLVED | Noted: 2019-07-03 | Resolved: 2024-01-19

## 2024-01-19 PROBLEM — R97.20 HIGH PROSTATE SPECIFIC ANTIGEN (PSA): Status: ACTIVE | Noted: 2023-12-03

## 2024-01-19 PROCEDURE — G0103 PSA SCREENING: HCPCS | Performed by: FAMILY MEDICINE

## 2024-01-19 PROCEDURE — 80048 BASIC METABOLIC PNL TOTAL CA: CPT | Performed by: FAMILY MEDICINE

## 2024-01-19 PROCEDURE — 36415 COLL VENOUS BLD VENIPUNCTURE: CPT | Performed by: FAMILY MEDICINE

## 2024-01-19 PROCEDURE — G0439 PPPS, SUBSEQ VISIT: HCPCS | Performed by: FAMILY MEDICINE

## 2024-01-19 PROCEDURE — 99213 OFFICE O/P EST LOW 20 MIN: CPT | Mod: 25 | Performed by: FAMILY MEDICINE

## 2024-01-19 PROCEDURE — 80061 LIPID PANEL: CPT | Performed by: FAMILY MEDICINE

## 2024-01-19 RX ORDER — RESPIRATORY SYNCYTIAL VIRUS VACCINE 120MCG/0.5
0.5 KIT INTRAMUSCULAR ONCE
Qty: 1 EACH | Refills: 0 | Status: SHIPPED | OUTPATIENT
Start: 2024-01-19 | End: 2024-01-19

## 2024-01-19 RX ORDER — SENNOSIDES 8.6 MG
650 CAPSULE ORAL EVERY 8 HOURS PRN
Qty: 90 TABLET | Refills: 0 | Status: SHIPPED | OUTPATIENT
Start: 2024-01-19

## 2024-01-19 ASSESSMENT — ENCOUNTER SYMPTOMS
CONSTIPATION: 0
EYE PAIN: 0
DIARRHEA: 1
NAUSEA: 0
PALPITATIONS: 0
ARTHRALGIAS: 0
FEVER: 0
DYSURIA: 1
JOINT SWELLING: 0
MYALGIAS: 0
HEMATOCHEZIA: 0
CHILLS: 0
PARESTHESIAS: 0
NERVOUS/ANXIOUS: 0
FREQUENCY: 1
WEAKNESS: 0
HEARTBURN: 0
DIZZINESS: 0
HEMATURIA: 0
ABDOMINAL PAIN: 0
SORE THROAT: 0
HEADACHES: 0
SHORTNESS OF BREATH: 0
COUGH: 0

## 2024-01-19 ASSESSMENT — PATIENT HEALTH QUESTIONNAIRE - PHQ9
SUM OF ALL RESPONSES TO PHQ QUESTIONS 1-9: 0
SUM OF ALL RESPONSES TO PHQ QUESTIONS 1-9: 0
10. IF YOU CHECKED OFF ANY PROBLEMS, HOW DIFFICULT HAVE THESE PROBLEMS MADE IT FOR YOU TO DO YOUR WORK, TAKE CARE OF THINGS AT HOME, OR GET ALONG WITH OTHER PEOPLE: NOT DIFFICULT AT ALL

## 2024-01-19 ASSESSMENT — ACTIVITIES OF DAILY LIVING (ADL): CURRENT_FUNCTION: NO ASSISTANCE NEEDED

## 2024-01-19 NOTE — COMMUNITY RESOURCES LIST (ENGLISH)
01/19/2024   [facility] [employer]  N/A  For questions about this resource list or additional care needs, please contact your primary care clinic or care manager.  Phone: [phone]   Email: N/A   Address: [facility] [place] [address]   Hours: N/A        Hotlines and Helplines       Hotline - Housing crisis  1  [facility] (Main Office) Distance: 17.89 miles      Phone/Virtual   [address] [place] [address]  Language: English  Hours: Mon - Sun Open 24 Hours   Phone: (202) 442-7803 Email: info@[facility] Website: http://[facility].Armut     2  [facility] Distance: 18.08 miles      Phone/Virtual   [address] [place] [address]  Language: English  Hours: Mon - Sun Open 24 Hours   Phone: (216) 608-9711 Email: communications@[facility].org Website: https://[facility]-[place].org/oursaviourshousing/          Housing       Coordinated Entry access point  3  [facility] Distance: 10.25 miles      In-Person, Phone/Virtual   [address] [place] [address]  Language: English  Hours: Mon - Fri 8:30 AM - 4:30 PM  Fees: Free   Phone: (570) 512-7918 Email: info@[facility].org Website: https://www.[facility].org/locations/[facility]/     4  [facility] ([facility] - Housing Services Distance: 18.23 miles      In-Person   [address] [place] [address]  Language: English  Hours: Mon - Fri 9:00 AM - 5:00 PM  Fees: Free   Phone: (490) 621-3691 Email: housing@[facility].org Website: http://www.[facility].org/housing     Drop-in center or day shelter  5  [facility] Distance: 9.29 miles      In-Person   [address] [place] [address]  Language: English  Hours: Mon - Fri 9:00 AM - 4:00 PM  Fees: Free   Phone: (402) 915-5575 Email: donnell@[employer].org Website: http://listeninghouse.org     6  [facility] and [place] [facility] Distance: 18.21 miles       In-Person   [address] [place] [address]  Language: English, Cameroonian, Kenyan  Hours: Mon - Sat 7:00 AM - 3:00 PM  Fees: Free, Self Pay   Phone: (828) 688-2004 Email: info@[facility] [employer]orge[employer].com Website: https://www.[employer]/locations/opportunity-center/     Housing search assistance  7  [facility] - Housing Resources Distance: 2.79 miles      In-Person, Phone/Virtual   [address] [place] [address]  Language: English  Hours: Mon - Fri 8:00 AM - 4:30 PM  Fees: Free   Phone: (923) 589-5891 Email: office@[employer] Website: http://washingtonCyber-Rain     8  [facility] Resources and Housing Information - Housing search assistance Distance: 5.82 miles      In-Person, Phone/Virtual   [address] [place] [address]  Language: English  Hours: Mon - Fri 8:00 AM - 4:30 PM  Fees: Free   Phone: (856) 925-1531 Email: pete@[facility]. Website: https://www.[facility]./Facilities/Facility/Details/[facility]-22     Shelter for families  9  [facility] Distance: 18.06 miles      In-Person   [address] [place] [address]  Language: English  Hours: Mon - Fri 3:00 PM - 9:00 AM , Sat - Sun Open 24 Hours  Fees: Free   Phone: (307) 274-9420 Ext.1 Website: https://www.[facility].org/2020/07/03/emergency-family-shelter/     10  [facility] Distance: 22.71 miles      In-Person   [address] [place] [address]  Language: English  Hours: Mon - Sun Open 24 Hours  Fees: Free, Self Pay   Phone: (224) 647-8964 Email: Joselin@[facility].org Website: http://www.[facility].org/     Shelter for individuals  11  [facility] - Homeless Resources and Housing Information - Emergency housing Distance: 5.82 miles      In-Person, Phone/Virtual   [address]  [place] [address]  Language: English  Hours: Mon - Fri 8:00 AM - 4:30 PM  Fees: Free   Phone: (254) 918-3953 Email: pete@[facility]. Website: https://www.[facility]./Facilities/Facility/Details/[place]-[facility]-22     12  [facility] - [facility] [place] [facility] - [facility] [place] [facility] Distance: 10.3 miles      In-Person   [address] [place] [address]  Language: English  Hours: Mon - Sun 5:00 PM - 10:00 AM  Fees: Free, Self Pay   Phone: (357) 612-5516 Email: info@[employer] Website: https://www.[employer].org/locations/[facility]-saint-paul/          Important Numbers & Websites       Emergency Services   911  [facility]   311  Poison Control   (237) 116-9410  Suicide Prevention Lifeline   (179) 274-2090 (TALK)  Child Abuse Hotline   (969) 721-2241 (4-A-Child)  Sexual Assault Hotline   (526) 547-2211 (HOPE)  National Runaway Safeline   (446) 858-2012 (RUNAWAY)  All-Options Talkline   (365) 939-4762  Substance Abuse Referral   (489) 180-3396 (HELP)

## 2024-01-19 NOTE — TELEPHONE ENCOUNTER
Pharmacist -  Mag at [facility] requesting approval to administer RSV of ARREYX instead of ABRYSVO.    Pharmacist reports ABRYSVO vaccine is saved and usually given to pregnant patients.    Pharmacist also reports that patient had Tdap on 5/15/2023, patient should be okay for tetanus until 2033, pharmacist wanting to know if patient is getting for acell pertussis? And to confirm that patient should have Tdap.    Please update in medication list if appropriate.     [facility] #[id number] - [place], [place] - [address] AT [facility] & [address]

## 2024-01-19 NOTE — PROGRESS NOTES
"SUBJECTIVE:   Jordan Miller 68 year old  who presents for Preventive Visit.      Patient has been advised of split billing requirements and indicates understanding: Yes   Are you in the first 12 months of your Medicare coverage?  No    ASSESSMENT / PLAN:   Jordan was seen today for medicare visit.    Diagnoses and all orders for this visit:    Encounter for Medicare annual wellness exam    Type 2 diabetes mellitus without complication, without long-term current use of insulin (H)  Comments:  stable -A1c 6.2 done last month    Benign prostatic hyperplasia with urinary obstruction    Dyslipidemia  -     Lipid Profile; Future  -     Lipid Profile    Encounter for vitamin deficiency screening    Hypertension, unspecified type  Comments:  well controlled  Orders:  -     Basic metabolic panel; Future  -     Basic metabolic panel    Need for Tdap vaccination  -     Discontinue: Tdap, tetanus-diptheria-acell pertussis, (BOOSTRIX) 5-2.5-18.5 LF-MCG/0.5 VINCENT injection; Inject 0.5 mLs into the muscle once for 1 dose    Need for vaccination against respiratory syncytial virus  -     respiratory syncytial virus vaccine, bivalent (ABRYSVO) injection; Inject 0.5 mLs into the muscle once for 1 dose    Screening for prostate cancer  -     PSA, screen; Future  -     PSA, screen    Primary osteoarthritis of both knees  Comments:  takes tylenol as needed refill done  Orders:  -     acetaminophen (TYLENOL) 650 MG CR tablet; Take 1 tablet (650 mg) by mouth every 8 hours as needed for mild pain or fever    Other orders  -     PRIMARY CARE FOLLOW-UP SCHEDULING; Future          Healthy Habits:     In general, how would you rate your overall health?  Good    Frequency of exercise:  6-7 days/week    Duration of exercise:  Greater than 60 minutes    Do you usually eat at least 4 servings of fruit and vegetables a day, include whole grains    & fiber and avoid regularly eating high fat or \"junk\" foods?  Yes    Taking medications regularly:  Yes    " Medication side effects:  Muscle aches    Ability to successfully perform activities of daily living:  No assistance needed    Home Safety:  No safety concerns identified    Hearing Impairment:  No hearing concerns    In the past 6 months, have you been bothered by leaking of urine?  No    In general, how would you rate your overall mental or emotional health?  Good    Additional concerns today:  No        Diabetes Follow-up    How often are you checking your blood sugar? Two times daily  What time of day are you checking your blood sugars (select all that apply)?  Before and after meals  Have you had any blood sugars above 200?  No  Have you had any blood sugars below 70?  No  What symptoms do you notice when your blood sugar is low?  None  What concerns do you have today about your diabetes? None   Do you have any of these symptoms? (Select all that apply)  Burning in feet    Hyperlipidemia Follow-Up    Are you regularly taking any medication or supplement to lower your cholesterol?   No  Are you having muscle aches or other side effects that you think could be caused by your cholesterol lowering medication?  No    Hypertension Follow-up    Do you check your blood pressure regularly outside of the clinic? Yes   Are you following a low salt diet? No  Are your blood pressures ever more than 140 on the top number (systolic) OR more   than 90 on the bottom number (diastolic), for example 140/90? No    BP Readings from Last 2 Encounters:   01/19/24 100/60   11/28/23 138/70     Hemoglobin A1C (%)   Date Value   11/28/2023 6.2 (H)   10/09/2023 6.8 (H)   06/11/2021 6.4 (H)   04/23/2021 6.5 (H)     LDL Cholesterol Calculated (mg/dL)   Date Value   11/28/2023 104 (H)   12/13/2022 100   04/23/2021 106 (H)   07/03/2019 82        Do you feel safe in your environment? Yes    Have you ever done Advance Care Planning? (For example, a Health Directive, POLST, or a discussion with a medical provider or your loved ones about your  wishes): No, advance care planning information given to patient to review.  Patient declined advance care planning discussion at this time.       Fall risk  Fallen 2 or more times in the past year?: No  Any fall with injury in the past year?: No    Cognitive Screening   1) Repeat 3 items (Leader, Season, Table)    2) Clock draw: NORMAL  3) 3 item recall: Recalls 3 objects  Results: 3 items recalled: COGNITIVE IMPAIRMENT LESS LIKELY    Mini-CogTM Copyright KIM Henderson. Licensed by the author for use in [facility]; reprinted with permission (alon@[email or web address]). All rights reserved.      Do you have sleep apnea, excessive snoring or daytime drowsiness? : no    Reviewed and updated as needed this visit by clinical staff   Tobacco  Allergies  Meds  Problems  Med Hx  Surg Hx  Fam Hx          Reviewed and updated as needed this visit by Provider   Tobacco  Allergies  Meds  Problems  Med Hx  Surg Hx  Fam Hx          Social History     Tobacco Use    Smoking status: Never    Smokeless tobacco: Never   Substance Use Topics    Alcohol use: Yes     Comment: 2-3 drinks a week hard liquor           1/19/2024     9:08 AM   Alcohol Use   Prescreen: >3 drinks/day or >7 drinks/week? No       Do you have a current opioid prescription? No  Do you use any other controlled substances or medications that are not prescribed by a provider? None          Current providers sharing in care for this patient include:   Patient Care Team:  Mira Han MD as PCP - General (Family Medicine)  Johanna Palencia RD as Diabetes Educator (Dietitian, Registered)  Mira Han MD as Assigned PCP  Johnnie Chavarria MD as MD (Cardiovascular Disease)  Jassi Zayas MD as Assigned Surgical Provider  Johnnie Chavarria MD as Assigned Heart and Vascular Provider  Maricarmen Penny RN as Lead Care Coordinator (Primary Care - CC)    The following health maintenance items are reviewed in Epic and correct as of today:  Health  Maintenance Due   Topic Date Due    RSV VACCINE (Pregnancy & 60+) (1 - 1-dose 60+ series) Never done    DIABETIC FOOT EXAM  06/08/2023    INFLUENZA VACCINE (1) 09/01/2023    COVID-19 Vaccine (4 - 2023-24 season) 09/01/2023     Lab work is in process  Labs reviewed in EPIC  BP Readings from Last 3 Encounters:   01/19/24 100/60   11/28/23 138/70   10/09/23 104/60    Wt Readings from Last 3 Encounters:   01/19/24 85.5 kg (188 lb 9.6 oz)   11/28/23 86.6 kg (191 lb)   10/09/23 85.5 kg (188 lb 6.4 oz)                Patient Active Problem List   Diagnosis    Type 2 diabetes mellitus without complication, without long-term current use of insulin (H)    Hypertension, unspecified type    Benign prostatic hyperplasia with urinary obstruction    Dyslipidemia    Primary osteoarthritis of both knees    Achilles tendinosis of left lower extremity    Cognitive impairment    Vitamin D insufficiency    Bilateral cataracts    Skin cyst    Increased frequency of urination    History of dislocation of elbow    High prostate specific antigen (PSA)     Past Surgical History:   Procedure Laterality Date    CHOLECYSTECTOMY      1994    HERNIA REPAIR, UMBILICAL      ?2013       Social History     Tobacco Use    Smoking status: Never    Smokeless tobacco: Never   Substance Use Topics    Alcohol use: Yes     Comment: 2-3 drinks a week hard liquor     History reviewed. No pertinent family history.        Current Outpatient Medications   Medication Sig Dispense Refill    acetaminophen (TYLENOL) 650 MG CR tablet Take 1 tablet (650 mg) by mouth every 8 hours as needed for mild pain or fever 90 tablet 0    respiratory syncytial virus vaccine, bivalent (ABRYSVO) injection Inject 0.5 mLs into the muscle once for 1 dose 1 each 0    acetaminophen (TYLENOL) 650 MG CR tablet Take 1 tablet (650 mg) by mouth every 8 hours as needed for mild pain or fever 90 tablet 3    alcohol swab prep pads Use to swab area of injection/loraine as directed. 100 each 3     Alcohol Swabs (ALCOHOL WIPES) 70 % PADS USE AS DIRECTED TO TEST BLOOD SUGARS 100 each 3    blood glucose (NO BRAND SPECIFIED) test strip Accu-Chek Guide test strips   TEST TWICE A DAY      blood glucose (NO BRAND SPECIFIED) test strip 1 each by Other route      blood glucose (ONETOUCH VERIO IQ) test strip Use to test blood sugar1  times daily. 100 strip 0    blood glucose calibration (ONETOUCH VERIO) solution Use to calibrate blood glucose monitor as directed. 1 each 1    blood glucose monitoring (ONETOUCH VERIO IQ SYSTEM) meter device kit Use to test blood sugar 1 times daily. 1 kit 0    cholecalciferol (VITAMIN D3) 125 mcg (5000 units) capsule       ciclopirox (PENLAC) 8 % external solution Apply to adjacent skin and affected nails daily.  Remove with alcohol every 7 days, then repeat. 6.6 mL 11    cyanocobalamin (VITAMIN B-12) 1000 MCG tablet Take 1 tablet (1,000 mcg) by mouth every other day 90 tablet 2    gatifloxacin (ZYMAXID) 0.5 % ophthalmic solution PLEASE SEE ATTACHED FOR DETAILED DIRECTIONS      glimepiride (AMARYL) 1 MG tablet Take 1 tablet (1 mg) by mouth every morning (before breakfast) 90 tablet 1    imiquimod (ALDARA) 5 % external cream Apply imiquimod to warts and cover with bandaid. Repeat nightly until resolved. Pause treatment if the area becomes very inflamed, resume when things are more calm 8 packet 4    ketoconazole (NIZORAL) 2 % external shampoo Wet affected area daily, apply shampoo and lather, let sit for 3-5 minutes and then rinse. 120 mL 11    ketorolac (ACULAR) 0.5 % ophthalmic solution PLEASE SEE ATTACHED FOR DETAILED DIRECTIONS      lisinopril (ZESTRIL) 5 MG tablet Take 1 tablet (5 mg) by mouth daily 90 tablet 3    metFORMIN (GLUCOPHAGE) 500 MG tablet Take 1 tablet (500 mg) by mouth daily (with breakfast) 90 tablet 3    methyl salicylate-menthol (ICY HOT) ointment Apply topically every 6 hours as needed (arthirist pain) 99.2 g 1    prednisoLONE acetate (PRED FORTE) 1 % ophthalmic  "suspension PLEASE SEE ATTACHED FOR DETAILED DIRECTIONS      RESTASIS 0.05 % ophthalmic emulsion       rosuvastatin (CRESTOR) 10 MG tablet Take 1 tablet (10 mg) by mouth daily 90 tablet 3    tamsulosin (FLOMAX) 0.4 MG capsule TAKE 1 CAPSULE BY MOUTH 2 TIMES DAILY. 180 capsule 2    vitamin D3 (CHOLECALCIFEROL) 50 mcg (2000 units) tablet Take 1 tablet (50 mcg) by mouth daily 90 tablet 1    vitamin D3 (CHOLECALCIFEROL) 50 mcg (2000 units) tablet Take 2 tablets (100 mcg) by mouth daily 180 tablet 2     Allergies   Allergen Reactions    No Known Allergies              Review of Systems   Constitutional:  Negative for chills and fever.   HENT:  Negative for congestion, ear pain, hearing loss and sore throat.    Eyes:  Negative for pain and visual disturbance.   Respiratory:  Negative for cough and shortness of breath.    Cardiovascular:  Negative for chest pain, palpitations and peripheral edema.   Gastrointestinal:  Positive for diarrhea. Negative for abdominal pain, constipation, heartburn, hematochezia and nausea.   Genitourinary:  Positive for dysuria, frequency and impotence. Negative for genital sores, hematuria and urgency.   Musculoskeletal:  Negative for arthralgias, joint swelling and myalgias.   Skin:  Negative for rash.   Neurological:  Negative for dizziness, weakness, headaches and paresthesias.   Psychiatric/Behavioral:  Positive for mood changes. The patient is not nervous/anxious.        Constitutional, HEENT, cardiovascular, pulmonary, gi and gu systems are negative, except as otherwise noted.    OBJECTIVE:   /60 (BP Location: Left arm, Patient Position: Sitting, Cuff Size: Adult Regular)   Pulse 65   Temp 97.5  F (36.4  C) (Temporal)   Ht 1.676 m (5' 6\")   Wt 85.5 kg (188 lb 9.6 oz)   SpO2 99%   BMI 30.44 kg/m     Estimated body mass index is 30.44 kg/m  as calculated from the following:    Height as of this encounter: 1.676 m (5' 6\").    Weight as of this encounter: 85.5 kg (188 lb 9.6 " "oz).  Physical Exam    GENERAL: healthy, alert and no distress  EYES: Eyes grossly normal to inspection, PERRL and conjunctivae and sclerae normal  HENT: ear canals and TM's normal, nose and mouth without ulcers or lesions  NECK: no adenopathy, no asymmetry, masses, or scars and thyroid normal to palpation  RESP: lungs clear to auscultation - no rales, rhonchi or wheezes  CV: regular rate and rhythm, normal S1 S2, no S3 or S4, no murmur, click or rub, no peripheral edema and peripheral pulses strong  MS: no gross musculoskeletal defects noted, no edema  PSYCH: mentation appears normal, affect normal    Diagnostic Test Results:  Labs reviewed in Epic  Patient has been advised of split billing requirements and indicates understanding: Yes    COUNSELING:  Reviewed preventive health counseling, as reflected in patient instructions       Regular exercise       Healthy diet/nutrition       Vision screening       Hearing screening       Dental care       Bladder control       Fall risk prevention       Immunizations  Info given for RSV       Colon cancer screening       Prostate cancer screening       The 10-year ASCVD risk score (Magda ALBA, et al., 2019) is: 17.5%    Values used to calculate the score:      Age: 68 years      Sex: Male      Is Non- : No      Diabetic: Yes      Tobacco smoker: No      Systolic Blood Pressure: 100 mmHg      Is BP treated: Yes      HDL Cholesterol: 63 mg/dL      Total Cholesterol: 183 mg/dL    Estimated body mass index is 30.44 kg/m  as calculated from the following:    Height as of this encounter: 1.676 m (5' 6\").    Weight as of this encounter: 85.5 kg (188 lb 9.6 oz).    Weight management plan: Discussed healthy diet and exercise guidelines    She reports that he has never smoked. He has never used smokeless tobacco.      Appropriate preventive services were discussed with this patient, including applicable screening as appropriate for cardiovascular disease, " diabetes, osteopenia/osteoporosis, and glaucoma.  As appropriate for age/gender, discussed screening for colorectal cancer, prostate cancer, breast cancer, and cervical cancer. Checklist reviewing preventive services available has been given to the patient.    Reviewed patients plan of care and provided an AVS. The Basic Care Plan (routine screening as documented in Health Maintenance) for Eunice meets the Care Plan requirement. This Care Plan has been established and reviewed with the Patient.  Mira Han MD  [facility]    Identified Health Risks:

## 2024-01-19 NOTE — PATIENT INSTRUCTIONS
Patient Education   Personalized Prevention Plan  You are due for the preventive services outlined below.  Your care team is available to assist you in scheduling these services.  If you have already completed any of these items, please share that information with your care team to update in your medical record.  Health Maintenance Due   Topic Date Due     RSV VACCINE (Pregnancy & 60+) (1 - 1-dose 60+ series) Never done     Diptheria Tetanus Pertussis (DTAP/TDAP/TD) Vaccine (1 - Tdap) 05/16/2023     Diabetic Foot Exam  06/08/2023     Flu Vaccine (1) 09/01/2023     COVID-19 Vaccine (4 - 2023-24 season) 09/01/2023     PHQ-2 (once per calendar year)  01/01/2024        Patient Education   Personalized Prevention Plan  You are due for the preventive services outlined below.  Your care team is available to assist you in scheduling these services.  If you have already completed any of these items, please share that information with your care team to update in your medical record.  Health Maintenance Due   Topic Date Due     RSV VACCINE (Pregnancy & 60+) (1 - 1-dose 60+ series) Never done     Diptheria Tetanus Pertussis (DTAP/TDAP/TD) Vaccine (1 - Tdap) 05/16/2023     Diabetic Foot Exam  06/08/2023     Flu Vaccine (1) 09/01/2023     COVID-19 Vaccine (4 - 2023-24 season) 09/01/2023     PHQ-2 (once per calendar year)  01/01/2024

## 2024-01-20 LAB
ANION GAP SERPL CALCULATED.3IONS-SCNC: 9 MMOL/L (ref 7–15)
BUN SERPL-MCNC: 9.9 MG/DL (ref 8–23)
CALCIUM SERPL-MCNC: 8.9 MG/DL (ref 8.8–10.2)
CHLORIDE SERPL-SCNC: 103 MMOL/L (ref 98–107)
CHOLEST SERPL-MCNC: 175 MG/DL
CREAT SERPL-MCNC: 0.95 MG/DL (ref 0.67–1.17)
DEPRECATED HCO3 PLAS-SCNC: 23 MMOL/L (ref 22–29)
EGFRCR SERPLBLD CKD-EPI 2021: 88 ML/MIN/1.73M2
FASTING STATUS PATIENT QL REPORTED: NORMAL
GLUCOSE SERPL-MCNC: 125 MG/DL (ref 70–99)
HDLC SERPL-MCNC: 62 MG/DL
LDLC SERPL CALC-MCNC: 100 MG/DL
NONHDLC SERPL-MCNC: 113 MG/DL
POTASSIUM SERPL-SCNC: 4.4 MMOL/L (ref 3.4–5.3)
PSA SERPL DL<=0.01 NG/ML-MCNC: 0.98 NG/ML (ref 0–4.5)
SODIUM SERPL-SCNC: 135 MMOL/L (ref 135–145)
TRIGL SERPL-MCNC: 63 MG/DL

## 2024-03-12 DIAGNOSIS — E11.9 TYPE 2 DIABETES MELLITUS WITHOUT COMPLICATION, WITHOUT LONG-TERM CURRENT USE OF INSULIN (H): ICD-10-CM

## 2024-03-12 RX ORDER — BLOOD SUGAR DIAGNOSTIC
STRIP MISCELLANEOUS
Qty: 100 STRIP | Refills: 0 | Status: SHIPPED | OUTPATIENT
Start: 2024-03-12

## 2024-03-29 ENCOUNTER — PATIENT OUTREACH (OUTPATIENT)
Dept: GERIATRIC MEDICINE | Facility: CLINIC | Age: 69
End: 2024-03-29
Payer: COMMERCIAL

## 2024-03-29 NOTE — LETTER
March 29, 2024      JORDAN LOZANO  [address] [place] [address]        Dear Jordan:     I m your care coordinator. I ve been unable to reach you by phone. I am writing to ask you or an authorized representative to call me at [phone]. If you reach my voicemail, please leave a message with your daytime telephone number. . Include a date and time that I can call you. If you are hearing impaired, call the [place] [employer] at 251 or [phone] (iqmnqv-eu-tavory relay service).     The reason I am trying to reach you is:    [] To schedule an assessment  [x] For your six (6)-month check-in  [] Other:      Please call me as soon as you receive this letter. I look forward to speaking with you.    Sincerely,      Maricarmen Penny RN, N  [phone]  [email or web address]      [facility] ([facility]) is a health plan that contracts with both Medicare and the [place] Medical Assistance (Medicaid) program to provide benefits of both programs to enrollees. Enrollment in [facility] depends on contract renewal.    [id number] accepted  [id number]                                                                         A (08/2022)

## 2024-03-29 NOTE — PROGRESS NOTES
"[facility] Care Coordination Contact    Per CC, mailed client an \"Unable to Contact\" letter.    Sara Russell  [facility]  Case Management Specialist  [phone]        "

## 2024-04-18 ENCOUNTER — PATIENT OUTREACH (OUTPATIENT)
Dept: GERIATRIC MEDICINE | Facility: CLINIC | Age: 69
End: 2024-04-18
Payer: COMMERCIAL

## 2024-04-18 NOTE — PROGRESS NOTES
"[facility] Care Coordination Contact    Called member to complete six month assessment and left a message requesting a return call. CC also sent \"Unable to contact letter\" to member. No response yet.     Maricarmen Penny RN, BSN, PHN  [facility]  Phone: [phone]        "

## 2024-04-28 ENCOUNTER — HEALTH MAINTENANCE LETTER (OUTPATIENT)
Age: 69
End: 2024-04-28

## 2024-05-23 ENCOUNTER — OFFICE VISIT (OUTPATIENT)
Dept: FAMILY MEDICINE | Facility: CLINIC | Age: 69
End: 2024-05-23
Payer: COMMERCIAL

## 2024-05-23 VITALS
SYSTOLIC BLOOD PRESSURE: 115 MMHG | WEIGHT: 191.9 LBS | HEIGHT: 66 IN | BODY MASS INDEX: 30.84 KG/M2 | HEART RATE: 81 BPM | OXYGEN SATURATION: 99 % | DIASTOLIC BLOOD PRESSURE: 72 MMHG

## 2024-05-23 DIAGNOSIS — E11.9 TYPE 2 DIABETES MELLITUS WITHOUT COMPLICATION, WITHOUT LONG-TERM CURRENT USE OF INSULIN (H): Primary | ICD-10-CM

## 2024-05-23 DIAGNOSIS — N13.8 BENIGN PROSTATIC HYPERPLASIA WITH URINARY OBSTRUCTION: ICD-10-CM

## 2024-05-23 DIAGNOSIS — N40.1 BENIGN PROSTATIC HYPERPLASIA WITH URINARY OBSTRUCTION: ICD-10-CM

## 2024-05-23 DIAGNOSIS — I10 HYPERTENSION, UNSPECIFIED TYPE: ICD-10-CM

## 2024-05-23 DIAGNOSIS — E78.5 DYSLIPIDEMIA: ICD-10-CM

## 2024-05-23 DIAGNOSIS — M17.0 PRIMARY OSTEOARTHRITIS OF BOTH KNEES: ICD-10-CM

## 2024-05-23 LAB — HBA1C MFR BLD: 6.5 % (ref 0–5.6)

## 2024-05-23 PROCEDURE — G2211 COMPLEX E/M VISIT ADD ON: HCPCS | Performed by: FAMILY MEDICINE

## 2024-05-23 PROCEDURE — 36415 COLL VENOUS BLD VENIPUNCTURE: CPT | Performed by: FAMILY MEDICINE

## 2024-05-23 PROCEDURE — 99214 OFFICE O/P EST MOD 30 MIN: CPT | Performed by: FAMILY MEDICINE

## 2024-05-23 PROCEDURE — 83036 HEMOGLOBIN GLYCOSYLATED A1C: CPT | Performed by: FAMILY MEDICINE

## 2024-05-23 RX ORDER — ROSUVASTATIN CALCIUM 5 MG/1
5 TABLET, COATED ORAL DAILY
Qty: 90 TABLET | Refills: 0 | Status: SHIPPED | OUTPATIENT
Start: 2024-05-23

## 2024-05-23 RX ORDER — RESPIRATORY SYNCYTIAL VISUS VACCINE RECOMBINANT, ADJUVANTED 120MCG/0.5
KIT INTRAMUSCULAR
COMMUNITY
Start: 2024-01-20

## 2024-05-23 NOTE — PROGRESS NOTES
Jordan was seen today for follow up.    Diagnoses and all orders for this visit:    Type 2 diabetes mellitus without complication, without long-term current use of insulin (H)  Comments:  slight worsening of diabetes from A1c from 6.2-6.5 . just came [place] from [place] , continue metformin and glimperide  Orders:  -     HEMOGLOBIN A1C; Future  -     Adult Eye  Referral; Future  -     HEMOGLOBIN A1C    Hypertension, unspecified type  Comments:  well controlled    Benign prostatic hyperplasia with urinary obstruction    Dyslipidemia  -     rosuvastatin (CRESTOR) 5 MG tablet; Take 1 tablet (5 mg) by mouth daily    Primary osteoarthritis of both knees    Other orders  -     REVIEW OF HEALTH MAINTENANCE PROTOCOL ORDERS     The longitudinal plan of care for the diagnosis(es)/condition(s) as documented were addressed during this visit. Due to the added complexity in care, I will continue to support Jordan in the subsequent management and with ongoing continuity of care.     Subjective     Jordan Miller 69 year old  presenting for the following health issues:    Patient presents with:  Follow Up: Follow up from travel.            5/23/2024    12:53 PM   Additional Questions   Roomed by Domonique TELLEZ MA       History of Present Illness       Reason for visit:  Personal          Diabetes Follow-up    How often are you checking your blood sugar? Two times daily  Have you had any blood sugars above 200?  No  Have you had any blood sugars below 70?  No  What symptoms do you notice when your blood sugar is low?  None  What concerns do you have today about your diabetes? None   Do you have any of these symptoms? (Select all that apply)  No numbness or tingling in feet.  No redness, sores or blisters on feet.  No complaints of excessive thirst.  No reports of blurry vision.  No significant changes to weight.  Have you had a diabetic eye exam in the last 12 months? Yes, does not remember when, [facility]        Hyperlipidemia  Follow-Up    Are you regularly taking any medication or supplement to lower your cholesterol?   NO  Are you having muscle aches or other side effects that you think could be caused by your cholesterol lowering medication?  No    Hypertension Follow-up    Do you check your blood pressure regularly outside of the clinic? Yes   Are you following a low salt diet? No  Are your blood pressures ever more than 140 on the top number (systolic) OR more   than 90 on the bottom number (diastolic), for example 140/90? Yes    BP Readings from Last 2 Encounters:   05/23/24 115/72   01/19/24 100/60     Hemoglobin A1C (%)   Date Value   05/23/2024 6.5 (H)   11/28/2023 6.2 (H)   06/11/2021 6.4 (H)   04/23/2021 6.5 (H)     LDL Cholesterol Calculated (mg/dL)   Date Value   01/19/2024 100   11/28/2023 104 (H)   04/23/2021 106 (H)   07/03/2019 82             Patient Active Problem List   Diagnosis    Type 2 diabetes mellitus without complication, without long-term current use of insulin (H)    Hypertension, unspecified type    Benign prostatic hyperplasia with urinary obstruction    Dyslipidemia    Primary osteoarthritis of both knees    Achilles tendinosis of left lower extremity    Cognitive impairment    Vitamin D insufficiency    Bilateral cataracts    Skin cyst    Increased frequency of urination    History of dislocation of elbow    High prostate specific antigen (PSA)      Social History     Social History Narrative    Not on file      Current Outpatient Medications   Medication Sig Dispense Refill    acetaminophen (TYLENOL) 650 MG CR tablet Take 1 tablet (650 mg) by mouth every 8 hours as needed for mild pain or fever 90 tablet 0    acetaminophen (TYLENOL) 650 MG CR tablet Take 1 tablet (650 mg) by mouth every 8 hours as needed for mild pain or fever 90 tablet 3    alcohol swab prep pads Use to swab area of injection/loraine as directed. 100 each 3    Alcohol Swabs (ALCOHOL WIPES) 70 % PADS USE AS DIRECTED TO TEST BLOOD SUGARS 100  each 3    AREXVY injection       blood glucose (NO BRAND SPECIFIED) test strip Accu-Chek Guide test strips   TEST TWICE A DAY      blood glucose (NO BRAND SPECIFIED) test strip 1 each by Other route      blood glucose calibration (ONETOUCH VERIO) solution Use to calibrate blood glucose monitor as directed. 1 each 1    blood glucose monitoring (ONETOUCH VERIO IQ SYSTEM) meter device kit Use to test blood sugar 1 times daily. 1 kit 0    cholecalciferol (VITAMIN D3) 125 mcg (5000 units) capsule       ciclopirox (PENLAC) 8 % external solution Apply to adjacent skin and affected nails daily.  Remove with alcohol every 7 days, then repeat. 6.6 mL 11    cyanocobalamin (VITAMIN B-12) 1000 MCG tablet Take 1 tablet (1,000 mcg) by mouth every other day 90 tablet 2    gatifloxacin (ZYMAXID) 0.5 % ophthalmic solution PLEASE SEE ATTACHED FOR DETAILED DIRECTIONS      glimepiride (AMARYL) 1 MG tablet Take 1 tablet (1 mg) by mouth every morning (before breakfast) 90 tablet 1    imiquimod (ALDARA) 5 % external cream Apply imiquimod to warts and cover with bandaid. Repeat nightly until resolved. Pause treatment if the area becomes very inflamed, resume when things are more calm 8 packet 4    ketoconazole (NIZORAL) 2 % external shampoo Wet affected area daily, apply shampoo and lather, let sit for 3-5 minutes and then rinse. 120 mL 11    ketorolac (ACULAR) 0.5 % ophthalmic solution PLEASE SEE ATTACHED FOR DETAILED DIRECTIONS      lisinopril (ZESTRIL) 5 MG tablet Take 1 tablet (5 mg) by mouth daily 90 tablet 3    metFORMIN (GLUCOPHAGE) 500 MG tablet Take 1 tablet (500 mg) by mouth daily (with breakfast) 90 tablet 3    methyl salicylate-menthol (ICY HOT) ointment Apply topically every 6 hours as needed (arthirist pain) 99.2 g 1    ONETOUCH VERIO IQ test strip USE TO TEST BLOOD SUGAR1 TIMES DAILY. 100 strip 0    prednisoLONE acetate (PRED FORTE) 1 % ophthalmic suspension PLEASE SEE ATTACHED FOR DETAILED DIRECTIONS      RESTASIS 0.05 %  "ophthalmic emulsion       rosuvastatin (CRESTOR) 5 MG tablet Take 1 tablet (5 mg) by mouth daily 90 tablet 0    tamsulosin (FLOMAX) 0.4 MG capsule TAKE 1 CAPSULE BY MOUTH 2 TIMES DAILY. 180 capsule 2    vitamin D3 (CHOLECALCIFEROL) 50 mcg (2000 units) tablet Take 1 tablet (50 mcg) by mouth daily 90 tablet 1    vitamin D3 (CHOLECALCIFEROL) 50 mcg (2000 units) tablet Take 2 tablets (100 mcg) by mouth daily 180 tablet 2     No current facility-administered medications for this visit.            Review of Systems   Constitutional, HEENT, cardiovascular, pulmonary, GI, , musculoskeletal, neuro, skin, endocrine and psych systems are negative, except as otherwise noted.      Wt Readings from Last 3 Encounters:   05/23/24 87 kg (191 lb 14.4 oz)   01/19/24 85.5 kg (188 lb 9.6 oz)   11/28/23 86.6 kg (191 lb)      Objective      /72 (BP Location: Left arm, Patient Position: Sitting, Cuff Size: Adult Regular)   Pulse 81   Ht 1.676 m (5' 6\")   Wt 87 kg (191 lb 14.4 oz)   SpO2 99%   BMI 30.97 kg/m     Physical Exam   GENERAL: alert and no distress  NECK: no adenopathy, no asymmetry, masses, or scars  RESP: lungs clear to auscultation - no rales, rhonchi or wheezes  CV: regular rate and rhythm, normal S1 S2, no S3 or S4, no murmur, click or rub, no peripheral edema  ABDOMEN: soft, nontender, no hepatosplenomegaly, no masses and bowel sounds normal  MS: no gross musculoskeletal defects noted, no edema  Diabetic foot exam: normal DP and PT pulses, no trophic changes or ulcerative lesions, and normal sensory exam    Office Visit on 01/19/2024   Component Date Value Ref Range Status    Cholesterol 01/19/2024 175  <200 mg/dL Final    Triglycerides 01/19/2024 63  <150 mg/dL Final    Direct Measure HDL 01/19/2024 62  >=40 mg/dL Final    LDL Cholesterol Calculated 01/19/2024 100  <=100 mg/dL Final    Non HDL Cholesterol 01/19/2024 113  <130 mg/dL Final    Patient Fasting > 8hrs? 01/19/2024 Unknown   Final    Sodium 01/19/2024 " 135  135 - 145 mmol/L Final    Reference intervals for this test were updated on 09/26/2023 to more accurately reflect our healthy population. There may be differences in the flagging of prior results with similar values performed with this method. Interpretation of those prior results can be made in the context of the updated reference intervals.     Potassium 01/19/2024 4.4  3.4 - 5.3 mmol/L Final    Chloride 01/19/2024 103  98 - 107 mmol/L Final    Carbon Dioxide (CO2) 01/19/2024 23  22 - 29 mmol/L Final    Anion Gap 01/19/2024 9  7 - 15 mmol/L Final    Urea Nitrogen 01/19/2024 9.9  8.0 - 23.0 mg/dL Final    Creatinine 01/19/2024 0.95  0.67 - 1.17 mg/dL Final    GFR Estimate 01/19/2024 88  >60 mL/min/1.73m2 Final    Calcium 01/19/2024 8.9  8.8 - 10.2 mg/dL Final    Glucose 01/19/2024 125 (H)  70 - 99 mg/dL Final    Prostate Specific Antigen Screen 01/19/2024 0.98  0.00 - 4.50 ng/mL Final       Mira Han MD 5/23/2024    [facility].  [phone]

## 2024-06-06 ENCOUNTER — OFFICE VISIT (OUTPATIENT)
Dept: CARDIOLOGY | Facility: CLINIC | Age: 69
End: 2024-06-06
Payer: COMMERCIAL

## 2024-06-06 VITALS
DIASTOLIC BLOOD PRESSURE: 50 MMHG | BODY MASS INDEX: 31.08 KG/M2 | HEIGHT: 66 IN | HEART RATE: 72 BPM | WEIGHT: 193.4 LBS | SYSTOLIC BLOOD PRESSURE: 118 MMHG | RESPIRATION RATE: 20 BRPM

## 2024-06-06 DIAGNOSIS — I10 HYPERTENSION, UNSPECIFIED TYPE: ICD-10-CM

## 2024-06-06 DIAGNOSIS — E11.9 TYPE 2 DIABETES MELLITUS WITHOUT COMPLICATION, WITHOUT LONG-TERM CURRENT USE OF INSULIN (H): ICD-10-CM

## 2024-06-06 DIAGNOSIS — I10 ESSENTIAL HYPERTENSION: ICD-10-CM

## 2024-06-06 DIAGNOSIS — Z00.00 ENCOUNTER FOR PREVENTIVE HEALTH EXAMINATION: Primary | ICD-10-CM

## 2024-06-06 PROCEDURE — 99214 OFFICE O/P EST MOD 30 MIN: CPT | Performed by: GENERAL ACUTE CARE HOSPITAL

## 2024-06-06 PROCEDURE — 93000 ELECTROCARDIOGRAM COMPLETE: CPT | Performed by: STUDENT IN AN ORGANIZED HEALTH CARE EDUCATION/TRAINING PROGRAM

## 2024-06-06 PROCEDURE — G2211 COMPLEX E/M VISIT ADD ON: HCPCS | Performed by: GENERAL ACUTE CARE HOSPITAL

## 2024-06-06 NOTE — LETTER
6/6/2024    Mira Han MD  [address] [facility] [address]    RE: Jordan Miller       Dear Colleague,     I had the pleasure of seeing Jordan Miller in the [facility].  HEART CARE ENCOUNTER NOTE        Assessment/Recommendations   Assessment:    Preventive health examination. No signs or symptoms of cardiac disease with excellent risk factor control.  Essential hypertension. Controlled.  Hyperlipidemia.  Non insulin-dependent diabetes mellitus type 2. Last hemoglobin A1c 6.5%.  Body mass index is 31.22 kg/m .    Plan:  Continue current medications.  He can follow-up with me in 2 years per his request, or sooner if he develops new symptoms.       The longitudinal plan of care for the diagnosis(es)/condition(s) as documented were addressed during this visit. Due to the added complexity in care, I will continue to support Jordan in the subsequent management and with ongoing continuity of care.    History of Present Illness   Mr. Jordan Miller is a 69 year old male with a significant past history of HTN, HLD and NIDDM2 presenting for a preventive health visit. He has no specific complaints or concerns but wants to make sure his heart is healthy.    He previously saw my cardiology colleague Dr. Chavarria 2 years ago with complaints of chest pain. He had an exercise stress echo 11/30/2022 which showed good functional capacity and no ischemia.    He walks and exercises regularly. No chest pain/pressure/tightness, shortness of breath at rest or with exertion, light headedness/dizziness, pre-syncope, syncope, lower extremity swelling, palpitations, paroxysmal nocturnal dyspnea (PND), or orthopnea.    He was recently prescribed rosuvastatin 5 mg daily. He is currently taking this only twice per week.     Cardiac Problems and Cardiac Diagnostics     Most Recent Cardiac testing:  ECG dated 6/6/2024 (personaly reviewed and interpreted): SR, normal ECG    Stress test 11/30/2022 (report reviewed):   1. Normal  stress echocardiogram without evidence of stress induced ischemia.  2. Normal resting LV systolic performance with an ejection fraction of 55-60%. There is normal improvement in left ventricular systolic performance with a peak ejection fraction of 70-75%.  3. No ECG evidence of ischemia.  4. No anginal chest pain reported with exercise.  5. Good functional capacity for age.       Medications  Allergies   Current Outpatient Medications   Medication Sig Dispense Refill    acetaminophen (TYLENOL) 650 MG CR tablet Take 1 tablet (650 mg) by mouth every 8 hours as needed for mild pain or fever 90 tablet 0    acetaminophen (TYLENOL) 650 MG CR tablet Take 1 tablet (650 mg) by mouth every 8 hours as needed for mild pain or fever 90 tablet 3    alcohol swab prep pads Use to swab area of injection/loraine as directed. 100 each 3    Alcohol Swabs (ALCOHOL WIPES) 70 % PADS USE AS DIRECTED TO TEST BLOOD SUGARS 100 each 3    AREXVY injection       blood glucose (NO BRAND SPECIFIED) test strip Accu-Chek Guide test strips   TEST TWICE A DAY      blood glucose (NO BRAND SPECIFIED) test strip 1 each by Other route      blood glucose calibration (ONETOUCH VERIO) solution Use to calibrate blood glucose monitor as directed. 1 each 1    blood glucose monitoring (ONETOUCH VERIO IQ SYSTEM) meter device kit Use to test blood sugar 1 times daily. 1 kit 0    ciclopirox (PENLAC) 8 % external solution Apply to adjacent skin and affected nails daily.  Remove with alcohol every 7 days, then repeat. 6.6 mL 11    cyanocobalamin (VITAMIN B-12) 1000 MCG tablet Take 1 tablet (1,000 mcg) by mouth every other day 90 tablet 2    glimepiride (AMARYL) 1 MG tablet Take 1 tablet (1 mg) by mouth every morning (before breakfast) 90 tablet 1    imiquimod (ALDARA) 5 % external cream Apply imiquimod to warts and cover with bandaid. Repeat nightly until resolved. Pause treatment if the area becomes very inflamed, resume when things are more calm 8 packet 4     "ketoconazole (NIZORAL) 2 % external shampoo Wet affected area daily, apply shampoo and lather, let sit for 3-5 minutes and then rinse. 120 mL 11    lisinopril (ZESTRIL) 5 MG tablet Take 1 tablet (5 mg) by mouth daily 90 tablet 3    metFORMIN (GLUCOPHAGE) 500 MG tablet Take 1 tablet (500 mg) by mouth daily (with breakfast) 90 tablet 3    methyl salicylate-menthol (ICY HOT) ointment Apply topically every 6 hours as needed (arthirist pain) 99.2 g 1    ONETOUCH VERIO IQ test strip USE TO TEST BLOOD SUGAR1 TIMES DAILY. 100 strip 0    RESTASIS 0.05 % ophthalmic emulsion Place 1 drop into both eyes as needed      rosuvastatin (CRESTOR) 5 MG tablet Take 1 tablet (5 mg) by mouth daily (Patient taking differently: Take 5 mg by mouth daily Once a week or twice a week) 90 tablet 0    tamsulosin (FLOMAX) 0.4 MG capsule TAKE 1 CAPSULE BY MOUTH 2 TIMES DAILY. (Patient taking differently: Take 0.4 mg by mouth daily) 180 capsule 2    vitamin D3 (CHOLECALCIFEROL) 50 mcg (2000 units) tablet Take 1 tablet (50 mcg) by mouth daily 90 tablet 1      Allergies   Allergen Reactions    No Known Allergies         Physical Examination Review of Systems   /50 (BP Location: Left arm, Patient Position: Sitting, Cuff Size: Adult Regular)   Pulse 72   Resp 20   Ht 1.676 m (5' 6\")   Wt 87.7 kg (193 lb 6.4 oz)   BMI 31.22 kg/m    Body mass index is 31.22 kg/m .  Wt Readings from Last 3 Encounters:   06/06/24 87.7 kg (193 lb 6.4 oz)   05/23/24 87 kg (191 lb 14.4 oz)   01/19/24 85.5 kg (188 lb 9.6 oz)       General Appearance:   Pleasant  male, appears  stated age. no acute distress, normal body habitus   ENT/Mouth: membranes moist, no apparent gingival bleeding.      EYES:  no scleral icterus, normal conjunctivae   Neck: no carotid bruits. No anterior cervical lymphadenopaty   Respiratory:   lungs are clear to auscultation, no rales or wheezing, equal chest wall expansion    Cardiovascular:   Regular rhythm, normal rate. Normal first and " second heart sounds with no murmurs, rubs, or gallops; the carotid, radial and posterior tibial pulses are intact, Jugular venous pressure normal, no edema bilaterally    Abdomen/GI:  no organomegaly, masses, bruits, or tenderness; bowel sounds are present   Extremities: no cyanosis or clubbing   Skin: no xanthelasma, warm.    Heme/lymph/ Immunology No apparent bleeding noted.   Neurologic: Alert and oriented. normal gait, no tremors     Psychiatric: Pleasant, calm, appropriate affect.    A complete 10 system review of systems was performed and is negative except as mentioned in the HPI/subjective.         Past History   Past Medical History:   Past Medical History:   Diagnosis Date    BPH (benign prostatic hyperplasia)     Hyperlipidemia     Hypertension     Left ankle pain     Type 2 diabetes mellitus (H)        Past Surgical History:   Past Surgical History:   Procedure Laterality Date    CHOLECYSTECTOMY          HERNIA REPAIR, UMBILICAL      ?     Family History:   Family History   Problem Relation Age of Onset    No Known Problems Father          at age 105    Heart Failure Brother     Diabetes Type 2  Brother        Social History:   Social History     Socioeconomic History    Marital status:      Spouse name: Not on file    Number of children: Not on file    Years of education: Not on file    Highest education level: Not on file   Occupational History    Not on file   Tobacco Use    Smoking status: Never     Passive exposure: Never    Smokeless tobacco: Never   Vaping Use    Vaping status: Never Used   Substance and Sexual Activity    Alcohol use: Yes     Comment: 2-3 drinks a week hard liquor    Drug use: Never    Sexual activity: Not on file   Other Topics Concern    Not on file   Social History Narrative    Not on file     Social Determinants of Health     Financial Resource Strain: Low Risk  (2024)    Financial Resource Strain     Within the past 12 months, have you or your family  members you live with been unable to get utilities (heat, electricity) when it was really needed?: No   Food Insecurity: Low Risk  (1/19/2024)    Food Insecurity     Within the past 12 months, did you worry that your food would run out before you got money to buy more?: No     Within the past 12 months, did the food you bought just not last and you didn t have money to get more?: No   Transportation Needs: Low Risk  (1/19/2024)    Transportation Needs     Within the past 12 months, has lack of transportation kept you from medical appointments, getting your medicines, non-medical meetings or appointments, work, or from getting things that you need?: No   Physical Activity: Not on file   Stress: Not on file   Social Connections: Not on file   Interpersonal Safety: Not on file   Housing Stability: High Risk (1/19/2024)    Housing Stability     Do you have housing? : No     Are you worried about losing your housing?: No              Lab Results    Chemistry/lipid CBC Cardiac Enzymes/BNP/TSH/INR   Lab Results   Component Value Date    CHOL 175 01/19/2024    HDL 62 01/19/2024     01/19/2024    TRIG 63 01/19/2024    CR 0.95 01/19/2024    BUN 9.9 01/19/2024    POTASSIUM 4.4 01/19/2024     01/19/2024    CO2 23 01/19/2024      Lab Results   Component Value Date    WBC 6.7 08/31/2021    HGB 13.4 08/31/2021    HCT 40.8 08/31/2021    MCV 86 08/31/2021     08/31/2021    A1C 6.5 (H) 05/23/2024     Lab Results   Component Value Date    A1C 6.5 (H) 05/23/2024    Lab Results   Component Value Date    TSH 0.77 06/11/2021          Brown Bradshaw MD [facility]  Non-Invasive Cardiologist  [facility]  Pager [phone]      Thank you for allowing me to participate in the care of your patient.      Sincerely,     Brown Bradshaw MD     [facility]  cc:   Brown Bradshaw MD  [address]  [place] [address]

## 2024-06-06 NOTE — PROGRESS NOTES
HEART CARE ENCOUNTER NOTE        Assessment/Recommendations   Assessment:    Preventive health examination. No signs or symptoms of cardiac disease with excellent risk factor control.  Essential hypertension. Controlled.  Hyperlipidemia.  Non insulin-dependent diabetes mellitus type 2. Last hemoglobin A1c 6.5%.  Body mass index is 31.22 kg/m .    Plan:  Continue current medications.  He can follow-up with me in 2 years per his request, or sooner if he develops new symptoms.       The longitudinal plan of care for the diagnosis(es)/condition(s) as documented were addressed during this visit. Due to the added complexity in care, I will continue to support Jordan in the subsequent management and with ongoing continuity of care.    History of Present Illness   Mr. Jordan Miller is a 69 year old male with a significant past history of HTN, HLD and NIDDM2 presenting for a preventive health visit. He has no specific complaints or concerns but wants to make sure his heart is healthy.    He previously saw my cardiology colleague Dr. Chavarria 2 years ago with complaints of chest pain. He had an exercise stress echo 11/30/2022 which showed good functional capacity and no ischemia.    He walks and exercises regularly. No chest pain/pressure/tightness, shortness of breath at rest or with exertion, light headedness/dizziness, pre-syncope, syncope, lower extremity swelling, palpitations, paroxysmal nocturnal dyspnea (PND), or orthopnea.    He was recently prescribed rosuvastatin 5 mg daily. He is currently taking this only twice per week.     Cardiac Problems and Cardiac Diagnostics     Most Recent Cardiac testing:  ECG dated 6/6/2024 (personaly reviewed and interpreted): SR, normal ECG    Stress test 11/30/2022 (report reviewed):   1. Normal stress echocardiogram without evidence of stress induced ischemia.  2. Normal resting LV systolic performance with an ejection fraction of 55-60%. There is normal improvement in left ventricular  systolic performance with a peak ejection fraction of 70-75%.  3. No ECG evidence of ischemia.  4. No anginal chest pain reported with exercise.  5. Good functional capacity for age.       Medications  Allergies   Current Outpatient Medications   Medication Sig Dispense Refill    acetaminophen (TYLENOL) 650 MG CR tablet Take 1 tablet (650 mg) by mouth every 8 hours as needed for mild pain or fever 90 tablet 0    acetaminophen (TYLENOL) 650 MG CR tablet Take 1 tablet (650 mg) by mouth every 8 hours as needed for mild pain or fever 90 tablet 3    alcohol swab prep pads Use to swab area of injection/loraine as directed. 100 each 3    Alcohol Swabs (ALCOHOL WIPES) 70 % PADS USE AS DIRECTED TO TEST BLOOD SUGARS 100 each 3    AREXVY injection       blood glucose (NO BRAND SPECIFIED) test strip Accu-Chek Guide test strips   TEST TWICE A DAY      blood glucose (NO BRAND SPECIFIED) test strip 1 each by Other route      blood glucose calibration (ONETOUCH VERIO) solution Use to calibrate blood glucose monitor as directed. 1 each 1    blood glucose monitoring (ONETOUCH VERIO IQ SYSTEM) meter device kit Use to test blood sugar 1 times daily. 1 kit 0    ciclopirox (PENLAC) 8 % external solution Apply to adjacent skin and affected nails daily.  Remove with alcohol every 7 days, then repeat. 6.6 mL 11    cyanocobalamin (VITAMIN B-12) 1000 MCG tablet Take 1 tablet (1,000 mcg) by mouth every other day 90 tablet 2    glimepiride (AMARYL) 1 MG tablet Take 1 tablet (1 mg) by mouth every morning (before breakfast) 90 tablet 1    imiquimod (ALDARA) 5 % external cream Apply imiquimod to warts and cover with bandaid. Repeat nightly until resolved. Pause treatment if the area becomes very inflamed, resume when things are more calm 8 packet 4    ketoconazole (NIZORAL) 2 % external shampoo Wet affected area daily, apply shampoo and lather, let sit for 3-5 minutes and then rinse. 120 mL 11    lisinopril (ZESTRIL) 5 MG tablet Take 1 tablet (5 mg)  "by mouth daily 90 tablet 3    metFORMIN (GLUCOPHAGE) 500 MG tablet Take 1 tablet (500 mg) by mouth daily (with breakfast) 90 tablet 3    methyl salicylate-menthol (ICY HOT) ointment Apply topically every 6 hours as needed (arthirist pain) 99.2 g 1    ONETOUCH VERIO IQ test strip USE TO TEST BLOOD SUGAR1 TIMES DAILY. 100 strip 0    RESTASIS 0.05 % ophthalmic emulsion Place 1 drop into both eyes as needed      rosuvastatin (CRESTOR) 5 MG tablet Take 1 tablet (5 mg) by mouth daily (Patient taking differently: Take 5 mg by mouth daily Once a week or twice a week) 90 tablet 0    tamsulosin (FLOMAX) 0.4 MG capsule TAKE 1 CAPSULE BY MOUTH 2 TIMES DAILY. (Patient taking differently: Take 0.4 mg by mouth daily) 180 capsule 2    vitamin D3 (CHOLECALCIFEROL) 50 mcg (2000 units) tablet Take 1 tablet (50 mcg) by mouth daily 90 tablet 1      Allergies   Allergen Reactions    No Known Allergies         Physical Examination Review of Systems   /50 (BP Location: Left arm, Patient Position: Sitting, Cuff Size: Adult Regular)   Pulse 72   Resp 20   Ht 1.676 m (5' 6\")   Wt 87.7 kg (193 lb 6.4 oz)   BMI 31.22 kg/m    Body mass index is 31.22 kg/m .  Wt Readings from Last 3 Encounters:   06/06/24 87.7 kg (193 lb 6.4 oz)   05/23/24 87 kg (191 lb 14.4 oz)   01/19/24 85.5 kg (188 lb 9.6 oz)       General Appearance:   Pleasant  male, appears  stated age. no acute distress, normal body habitus   ENT/Mouth: membranes moist, no apparent gingival bleeding.      EYES:  no scleral icterus, normal conjunctivae   Neck: no carotid bruits. No anterior cervical lymphadenopaty   Respiratory:   lungs are clear to auscultation, no rales or wheezing, equal chest wall expansion    Cardiovascular:   Regular rhythm, normal rate. Normal first and second heart sounds with no murmurs, rubs, or gallops; the carotid, radial and posterior tibial pulses are intact, Jugular venous pressure normal, no edema bilaterally    Abdomen/GI:  no organomegaly, " masses, bruits, or tenderness; bowel sounds are present   Extremities: no cyanosis or clubbing   Skin: no xanthelasma, warm.    Heme/lymph/ Immunology No apparent bleeding noted.   Neurologic: Alert and oriented. normal gait, no tremors     Psychiatric: Pleasant, calm, appropriate affect.    A complete 10 system review of systems was performed and is negative except as mentioned in the HPI/subjective.         Past History   Past Medical History:   Past Medical History:   Diagnosis Date    BPH (benign prostatic hyperplasia)     Hyperlipidemia     Hypertension     Left ankle pain     Type 2 diabetes mellitus (H)        Past Surgical History:   Past Surgical History:   Procedure Laterality Date    CHOLECYSTECTOMY          HERNIA REPAIR, UMBILICAL      ?     Family History:   Family History   Problem Relation Age of Onset    No Known Problems Father          at age 105    Heart Failure Brother     Diabetes Type 2  Brother        Social History:   Social History     Socioeconomic History    Marital status:      Spouse name: Not on file    Number of children: Not on file    Years of education: Not on file    Highest education level: Not on file   Occupational History    Not on file   Tobacco Use    Smoking status: Never     Passive exposure: Never    Smokeless tobacco: Never   Vaping Use    Vaping status: Never Used   Substance and Sexual Activity    Alcohol use: Yes     Comment: 2-3 drinks a week hard liquor    Drug use: Never    Sexual activity: Not on file   Other Topics Concern    Not on file   Social History Narrative    Not on file     Social Determinants of Health     Financial Resource Strain: Low Risk  (2024)    Financial Resource Strain     Within the past 12 months, have you or your family members you live with been unable to get utilities (heat, electricity) when it was really needed?: No   Food Insecurity: Low Risk  (2024)    Food Insecurity     Within the past 12 months, did you  worry that your food would run out before you got money to buy more?: No     Within the past 12 months, did the food you bought just not last and you didn t have money to get more?: No   Transportation Needs: Low Risk  (1/19/2024)    Transportation Needs     Within the past 12 months, has lack of transportation kept you from medical appointments, getting your medicines, non-medical meetings or appointments, work, or from getting things that you need?: No   Physical Activity: Not on file   Stress: Not on file   Social Connections: Not on file   Interpersonal Safety: Not on file   Housing Stability: High Risk (1/19/2024)    Housing Stability     Do you have housing? : No     Are you worried about losing your housing?: No              Lab Results    Chemistry/lipid CBC Cardiac Enzymes/BNP/TSH/INR   Lab Results   Component Value Date    CHOL 175 01/19/2024    HDL 62 01/19/2024     01/19/2024    TRIG 63 01/19/2024    CR 0.95 01/19/2024    BUN 9.9 01/19/2024    POTASSIUM 4.4 01/19/2024     01/19/2024    CO2 23 01/19/2024      Lab Results   Component Value Date    WBC 6.7 08/31/2021    HGB 13.4 08/31/2021    HCT 40.8 08/31/2021    MCV 86 08/31/2021     08/31/2021    A1C 6.5 (H) 05/23/2024     Lab Results   Component Value Date    A1C 6.5 (H) 05/23/2024    Lab Results   Component Value Date    TSH 0.77 06/11/2021          Brown Bradshaw MD [facility]  Non-Invasive Cardiologist  [facility]  Pager [phone]

## 2024-06-07 LAB
ATRIAL RATE - MUSE: 63 BPM
DIASTOLIC BLOOD PRESSURE - MUSE: NORMAL MMHG
INTERPRETATION ECG - MUSE: NORMAL
P AXIS - MUSE: 53 DEGREES
PR INTERVAL - MUSE: 130 MS
QRS DURATION - MUSE: 92 MS
QT - MUSE: 360 MS
QTC - MUSE: 368 MS
R AXIS - MUSE: -28 DEGREES
SYSTOLIC BLOOD PRESSURE - MUSE: NORMAL MMHG
T AXIS - MUSE: 8 DEGREES
VENTRICULAR RATE- MUSE: 63 BPM

## 2024-07-24 ENCOUNTER — OFFICE VISIT (OUTPATIENT)
Dept: FAMILY MEDICINE | Facility: CLINIC | Age: 69
End: 2024-07-24
Payer: COMMERCIAL

## 2024-07-24 VITALS
DIASTOLIC BLOOD PRESSURE: 73 MMHG | HEART RATE: 85 BPM | HEIGHT: 66 IN | BODY MASS INDEX: 30.89 KG/M2 | SYSTOLIC BLOOD PRESSURE: 126 MMHG | RESPIRATION RATE: 18 BRPM | OXYGEN SATURATION: 100 % | WEIGHT: 192.19 LBS

## 2024-07-24 DIAGNOSIS — I10 HYPERTENSION, UNSPECIFIED TYPE: ICD-10-CM

## 2024-07-24 DIAGNOSIS — E11.9 TYPE 2 DIABETES MELLITUS WITHOUT COMPLICATION, WITHOUT LONG-TERM CURRENT USE OF INSULIN (H): ICD-10-CM

## 2024-07-24 DIAGNOSIS — M75.41 IMPINGEMENT SYNDROME, SHOULDER, RIGHT: Primary | ICD-10-CM

## 2024-07-24 PROCEDURE — G2211 COMPLEX E/M VISIT ADD ON: HCPCS | Performed by: FAMILY MEDICINE

## 2024-07-24 PROCEDURE — 99213 OFFICE O/P EST LOW 20 MIN: CPT | Performed by: FAMILY MEDICINE

## 2024-07-24 NOTE — PROGRESS NOTES
Jordan was seen today for recheck.    Diagnoses and all orders for this visit:    Impingement syndrome, shoulder, right  -     Physical Therapy  Referral; Future    Type 2 diabetes mellitus without complication, without long-term current use of insulin (H)  -     Hemoglobin A1c; Future    Hypertension, unspecified type    The longitudinal plan of care for the diagnosis(es)/condition(s) as documented were addressed during this visit. Due to the added complexity in care, I will continue to support Jordan in the subsequent management and with ongoing continuity of care.  Written education provided on home exercise program for strengthening the rotator cuff and increased range of motion  Referral physical therapy  The longitudinal plan of care for the diagnosis(es)/condition(s) as documented were addressed during this visit. Due to the added complexity in care, I will continue to support Jordan in the subsequent management and with ongoing continuity of care.  Subjective     Jordan Miller 69 year old  presenting for the following health issues:    Patient presents with:  RECHECK:  right shoulder pain for two days, no injury, not fasting           7/24/2024    12:00 PM   Additional Questions   Roomed by FAREED Huerta       History of Present Illness       Reason for visit:  Orders for bloodwork        Shoulder pain on the right side.    Onset of injury or pain occurred 1 week ago.  The pain is achy, constant, usually at night, and worse with activity and superior and anterior. .range of motion restricted with overhead extension and internal rotation. Rates the pain 5/10 all the time . Does do wt lifting at the gym and regular exercises , no known injury   Diabetes Follow-up    How often are you checking your blood sugar? A few times a week  What time of day are you checking your blood sugars (select all that apply)?  Before meals  Have you had any blood sugars above 200?  No  Have you had any blood sugars below 70?   No  What symptoms do you notice when your blood sugar is low?  None  What concerns do you have today about your diabetes? None   Do you have any of these symptoms? (Select all that apply)  No numbness or tingling in feet.  No redness, sores or blisters on feet.  No complaints of excessive thirst.  No reports of blurry vision.  No significant changes to weight.  Have you had a diabetic eye exam in the last 12 months? yes        BP Readings from Last 2 Encounters:   07/24/24 126/73   06/06/24 118/50     Hemoglobin A1C (%)   Date Value   05/23/2024 6.5 (H)   11/28/2023 6.2 (H)   06/11/2021 6.4 (H)   04/23/2021 6.5 (H)     LDL Cholesterol Calculated (mg/dL)   Date Value   01/19/2024 100   11/28/2023 104 (H)   04/23/2021 106 (H)   07/03/2019 82             Hyperlipidemia Follow-Up    Are you regularly taking any medication or supplement to lower your cholesterol?   Yes- lipitor   Are you having muscle aches or other side effects that you think could be caused by your cholesterol lowering medication?  No    Hypertension Follow-up    Do you check your blood pressure regularly outside of the clinic? Yes   Are you following a low salt diet? Yes  Are your blood pressures ever more than 140 on the top number (systolic) OR more   than 90 on the bottom number (diastolic), for example 140/90? No          Patient Active Problem List   Diagnosis    Type 2 diabetes mellitus without complication, without long-term current use of insulin (H)    Hypertension, unspecified type    Benign prostatic hyperplasia with urinary obstruction    Dyslipidemia    Primary osteoarthritis of both knees    Achilles tendinosis of left lower extremity    Cognitive impairment    Vitamin D insufficiency    Bilateral cataracts    Skin cyst    Increased frequency of urination    History of dislocation of elbow    High prostate specific antigen (PSA)      Social History     Social History Narrative    Not on file      Current Outpatient Medications    Medication Sig Dispense Refill    acetaminophen (TYLENOL) 650 MG CR tablet Take 1 tablet (650 mg) by mouth every 8 hours as needed for mild pain or fever 90 tablet 0    acetaminophen (TYLENOL) 650 MG CR tablet Take 1 tablet (650 mg) by mouth every 8 hours as needed for mild pain or fever 90 tablet 3    alcohol swab prep pads Use to swab area of injection/loraine as directed. 100 each 3    Alcohol Swabs (ALCOHOL WIPES) 70 % PADS USE AS DIRECTED TO TEST BLOOD SUGARS 100 each 3    AREXVY injection       blood glucose (NO BRAND SPECIFIED) test strip Accu-Chek Guide test strips   TEST TWICE A DAY      blood glucose (NO BRAND SPECIFIED) test strip 1 each by Other route      blood glucose calibration (ONETOUCH VERIO) solution Use to calibrate blood glucose monitor as directed. 1 each 1    blood glucose monitoring (ONETOUCH VERIO IQ SYSTEM) meter device kit Use to test blood sugar 1 times daily. 1 kit 0    ciclopirox (PENLAC) 8 % external solution Apply to adjacent skin and affected nails daily.  Remove with alcohol every 7 days, then repeat. 6.6 mL 11    cyanocobalamin (VITAMIN B-12) 1000 MCG tablet Take 1 tablet (1,000 mcg) by mouth every other day 90 tablet 2    glimepiride (AMARYL) 1 MG tablet Take 1 tablet (1 mg) by mouth every morning (before breakfast) 90 tablet 1    imiquimod (ALDARA) 5 % external cream Apply imiquimod to warts and cover with bandaid. Repeat nightly until resolved. Pause treatment if the area becomes very inflamed, resume when things are more calm 8 packet 4    ketoconazole (NIZORAL) 2 % external shampoo Wet affected area daily, apply shampoo and lather, let sit for 3-5 minutes and then rinse. 120 mL 11    lisinopril (ZESTRIL) 5 MG tablet Take 1 tablet (5 mg) by mouth daily 90 tablet 3    metFORMIN (GLUCOPHAGE) 500 MG tablet Take 1 tablet (500 mg) by mouth daily (with breakfast) 90 tablet 3    methyl salicylate-menthol (ICY HOT) ointment Apply topically every 6 hours as needed (arthirist pain) 99.2  "g 1    ONETOUCH VERIO IQ test strip USE TO TEST BLOOD SUGAR1 TIMES DAILY. 100 strip 0    RESTASIS 0.05 % ophthalmic emulsion Place 1 drop into both eyes as needed      rosuvastatin (CRESTOR) 5 MG tablet Take 1 tablet (5 mg) by mouth daily (Patient taking differently: Take 5 mg by mouth daily Once a week or twice a week) 90 tablet 0    tamsulosin (FLOMAX) 0.4 MG capsule TAKE 1 CAPSULE BY MOUTH 2 TIMES DAILY. (Patient taking differently: Take 0.4 mg by mouth daily) 180 capsule 2    vitamin D3 (CHOLECALCIFEROL) 50 mcg (2000 units) tablet Take 1 tablet (50 mcg) by mouth daily 90 tablet 1     No current facility-administered medications for this visit.            Review of Systems   Constitutional, HEENT, cardiovascular, pulmonary, gi and gu systems are negative, except as otherwise noted.      Wt Readings from Last 3 Encounters:   07/24/24 87.2 kg (192 lb 3 oz)   06/06/24 87.7 kg (193 lb 6.4 oz)   05/23/24 87 kg (191 lb 14.4 oz)      Objective      /73 (BP Location: Left arm, Patient Position: Sitting, Cuff Size: Adult Regular)   Pulse 85   Resp 18   Ht 1.676 m (5' 6\")   Wt 87.2 kg (192 lb 3 oz)   SpO2 100%   BMI 31.02 kg/m     Physical Exam   GENERAL: alert and no distress  NECK: no adenopathy, no asymmetry, masses, or scars  RESP: lungs clear to auscultation - no rales, rhonchi or wheezes  CV: regular rate and rhythm, normal S1 S2, no S3 or S4, no murmur, click or rub, no peripheral edema  MS: RUE exam shows normal strength and muscle mass .decreased range of motion with pain at right shoulder area specifically at 90 degrees and over head extension  Office Visit on 06/06/2024   Component Date Value Ref Range Status    Ventricular Rate 06/06/2024 63  BPM Incomplete    Atrial Rate 06/06/2024 63  BPM Incomplete    TX Interval 06/06/2024 130  ms Incomplete    QRS Duration 06/06/2024 92  ms Incomplete    QT 06/06/2024 360  ms Incomplete    QTc 06/06/2024 368  ms Incomplete    P Axis 06/06/2024 53  degrees " Incomplete    R AXIS 06/06/2024 -28  degrees Incomplete    T Axis 06/06/2024 8  degrees Incomplete    Interpretation ECG 06/06/2024    Incomplete                    Value:Sinus rhythm  Normal ECG  No previous ECGs available         Mira Han MD 7/24/2024    [facility].  [phone]

## 2024-07-26 ENCOUNTER — THERAPY VISIT (OUTPATIENT)
Dept: PHYSICAL THERAPY | Facility: REHABILITATION | Age: 69
End: 2024-07-26
Attending: FAMILY MEDICINE
Payer: COMMERCIAL

## 2024-07-26 DIAGNOSIS — M75.41 IMPINGEMENT SYNDROME, SHOULDER, RIGHT: ICD-10-CM

## 2024-07-26 PROCEDURE — 97161 PT EVAL LOW COMPLEX 20 MIN: CPT | Mod: GP

## 2024-07-26 PROCEDURE — 97110 THERAPEUTIC EXERCISES: CPT | Mod: GP

## 2024-07-26 NOTE — PROGRESS NOTES
PHYSICAL THERAPY EVALUATION  Type of Visit: Evaluation       Fall Risk Screen:  Fall screen completed by: PT  Have you fallen 2 or more times in the past year?: No  Have you fallen and had an injury in the past year?: No  Is patient a fall risk?: No    Subjective         Pt presents to PT for R shoulder pain with insidious  onset  about 4 days ago. Pt does not know what could have caused this pain.  He currently reports that he is not able to lift his R arm very high and is  not able to lift  like he  normally does. Pt goes to the gym 7 days a week, walks ~ 2.5 miles every day, swims, and lifts weights at the gym. He reports  that all overhead motions increase his pain. Pt denies numbness and tingling. He reports the  pain does  not impact his sleep.    Presenting condition or subjective complaint: Right shoulder pain  Date of onset: 07/24/24 (order  date)    Relevant medical history: Diabetes   Dates & types of surgery: Gall bladder removal 30 years ago    Prior diagnostic imaging/testing results:       Prior therapy history for the same diagnosis, illness or injury: No      Prior Level of Function  Transfers: Independent  Ambulation: Independent  ADL: Independent  IADL:     Living Environment  Social support: With family members   Type of home: House; 1 level   Stairs to enter the home: Yes 6 Is there a railing: Yes     Ramp: Yes   Stairs inside the home: Yes 4     Help at home: Self Cares (home health aide/personal care attendant, family, etc)  Equipment owned:       Employment: No    Hobbies/Interests: walk and gym    Patient goals for therapy:      Pain assessment: Pain present     Objective   SHOULDER EVALUATION  PAIN: Pain Quality: Sharp  INTEGUMENTARY (edema, incisions): WNL  POSTURE: Sitting Posture: Rounded shoulders, Thoracic kyphosis increased  GAIT: WFL  BALANCE/PROPRIOCEPTION: WFL  WEIGHTBEARING ALIGNMENT:   ROM:   (Degrees) Left AROM Left PROM Right AROM  Right PROM   Shoulder Flexion 150  119     Shoulder Extension       Shoulder Abduction 160  109    Shoulder Adduction       Shoulder Internal Rotation 75  14    Shoulder External Rotation 73  30    Shoulder Horizontal Abduction       Shoulder Horizontal Adduction       Shoulder Flexion ER       Shoulder Flexion IR       Elbow Extension 0  0    Elbow Flexion 144  144, slight pain  at end range    Pain: pain with all overhead motions  End feel: empty    STRENGTH:   Pain: - none + mild ++ moderate +++ severe  Strength Scale: 0-5/5 Left Right   Shoulder Flexion 5 4   Shoulder Extension 5 4+   Shoulder Abduction 5 4   Shoulder Adduction     Shoulder Internal Rotation 5 4, + (mild)   Shoulder External Rotation 5 4, + (mild)   Shoulder Horizontal Abduction     Shoulder Horizontal Adduction     Elbow Flexion 5 4+   Elbow Extension 5 4+   Mid Trap     Lower Trap     Rhomboid     Serratus Anterior       FLEXIBILITY:   SPECIAL TESTS:    Left Right   Impingement     Neer's  Positive   Hawkin's-Reuben  Negative    Coracoid Impingement  Negative    Internal impingement     Labral     Anterior Slide     Kingfisher's     Crank     Instability     Apprehension (anterior)  Positive   Relocation (anterior)  Positive   Anterior Load & Shift  Positive   Posterior Load & Shift  Positive   Posterior instability (with 90 degrees flex)     Multi-Directional Instability      Sulcus     Biceps      Speed's  Positive   Rotator Cuff Tear     Drop Arm     Belly Press  Positive   Lift off   Positive     PALPATION:   + Tenderness At Location Left Right   Clavicle     Sternoclavicular     Acromioclavicular  +   Biceps  +   Triceps     Supraspinatus     Infraspinatus  +,tendon   Teres minor     Subscapularis     Deltoid     Levator     Rhomboids     Upper trap     Incisional     Bicipital groove  +         Assessment & Plan   CLINICAL IMPRESSIONS  Medical Diagnosis: Impingement syndrome, shoulder, right    Treatment Diagnosis: Right shoulder pain, weakness, motion deficits, poor posture    Impression/Assessment: Patient is a 69 year old male with right shoulder pain complaints.  The following significant findings have been identified: Pain, Decreased ROM/flexibility, Decreased joint mobility, Decreased strength, Impaired muscle performance, Decreased activity tolerance, and Impaired posture. These impairments interfere with their ability to perform self care tasks, work tasks, recreational activities, and household chores as compared to previous level of function.     Clinical Decision Making (Complexity):  Clinical Presentation: Stable/Uncomplicated  Clinical Presentation Rationale: based on medical and personal factors listed in PT evaluation  Clinical Decision Making (Complexity): Low complexity    PLAN OF CARE  Treatment Interventions:  Modalities: Dry Needling, E-stim  Interventions: Manual Therapy, Neuromuscular Re-education, Therapeutic Activity, Therapeutic Exercise, Self-Care/Home Management    Long Term Goals     PT Goal 1  Goal Identifier: HEP  Goal Description: Pt will demonstrate understanding and independece of HEP in 30 days.  Rationale: to maximize safety and independence with performance of ADLs and functional tasks  Goal Progress: initial  Target Date: 08/24/24  PT Goal 2  Goal Identifier: ROM  Goal Description: Pt will improve R  shoulder  AROM to match contralateral side by the end of therapy in order to improve functional ADLs  Goal Progress: initial  Target Date: 10/24/24  PT Goal 3  Goal Identifier: strength  Goal Description: Pt will improve R sided strength by 2 grades by the end of therapy in order to return  to previous weight lifting routine.  Goal Progress: initial  Target Date: 10/24/24      Frequency of Treatment: 1 x / week  Duration of Treatment: 90 days    Recommended Referrals to Other Professionals:   Education Assessment:   Learner/Method: Patient;Demonstration    Risks and benefits of evaluation/treatment have been explained.   Patient/Family/caregiver agrees  with Plan of Care.     Evaluation Time:     PT Eval, Low Complexity Minutes (38075): 28       Signing Clinician: SUSANNE Scott [facility]                                                                                   OUTPATIENT PHYSICAL THERAPY      PLAN OF TREATMENT FOR OUTPATIENT REHABILITATION   Patient's Last Name, First Name, Jordan Mcdonald    YOB: 1955   Provider's Name   [facility]   Medical Record No.  [id number]     Onset Date: 07/24/24 (order  date)  Start of Care Date: 07/26/24     Medical Diagnosis:  Impingement syndrome, shoulder, right      PT Treatment Diagnosis:  Right shoulder pain, weakness, motion deficits, poor posture Plan of Treatment  Frequency/Duration: 1 x / week/ 90 days    Certification date from 07/26/24 to 10/24/24         See note for plan of treatment details and functional goals     Andreea Sharma PT                         I CERTIFY THE NEED FOR THESE SERVICES FURNISHED UNDER        THIS PLAN OF TREATMENT AND WHILE UNDER MY CARE     (Physician attestation of this document indicates review and certification of the therapy plan).              Referring Provider:  Mira Han    Initial Assessment  See Epic Evaluation- Start of Care Date: 07/26/24

## 2024-07-29 ENCOUNTER — OFFICE VISIT (OUTPATIENT)
Dept: DERMATOLOGY | Facility: CLINIC | Age: 69
End: 2024-07-29
Payer: COMMERCIAL

## 2024-07-29 DIAGNOSIS — B07.8 OTHER VIRAL WARTS: Primary | ICD-10-CM

## 2024-07-29 DIAGNOSIS — L30.4 INTERTRIGO: ICD-10-CM

## 2024-07-29 PROCEDURE — 17110 DESTRUCTION B9 LES UP TO 14: CPT | Performed by: STUDENT IN AN ORGANIZED HEALTH CARE EDUCATION/TRAINING PROGRAM

## 2024-07-29 PROCEDURE — 99214 OFFICE O/P EST MOD 30 MIN: CPT | Mod: 25 | Performed by: STUDENT IN AN ORGANIZED HEALTH CARE EDUCATION/TRAINING PROGRAM

## 2024-07-29 RX ORDER — HYDROCORTISONE 2.5 %
CREAM (GRAM) TOPICAL 2 TIMES DAILY
Qty: 30 G | Refills: 0 | Status: SHIPPED | OUTPATIENT
Start: 2024-07-29

## 2024-07-29 RX ORDER — IMIQUIMOD 12.5 MG/.25G
CREAM TOPICAL
Qty: 24 PACKET | Refills: 3 | Status: SHIPPED | OUTPATIENT
Start: 2024-07-29

## 2024-07-29 NOTE — PROGRESS NOTES
[facility] Dermatology Note    Encounter Date: Jul 29, 2024    Dermatology Problem List:    ______________________________________    Impression/Plan:  Jordan was seen today for derm problem.    Diagnoses and all orders for this visit:    Other viral warts  -     imiquimod (ALDARA) 5 % external cream; Apply nightly to warts, cover with band aid after application  -     DESTRUCT BENIGN LESION, UP TO 14  - x4 R and L hand  - apply aldara nightly     Intertrigo  -     hydrocortisone 2.5 % cream; Apply topically 2 times daily  - rec moisture and friction reduction  - hydro cortisone BID PRN     Cryotherapy procedure note: After verbal consent and discussion of risks and benefits including but no limited to dyspigmentation/scar, blister, and pain, 4 warts on L and R hadn was(were) treated with 1-2mm freeze border for 2 cycles with liquid nitrogen. Post cryotherapy instructions were provided.     Follow-up PRN.       Staff Involved:  Staff Only    Jassi Zayas MD   of Dermatology  Department of Dermatology  [facility]      CC:   Chief Complaint   Patient presents with    Derm Problem     Follow up right hand        History of Present Illness:  Mr. Jordan Miller is a 69 year old male who presents as a return patient.    Last seen 10/2023 for warts. LN2 + imiquimod. Keto shampoo for seb derm. Presents today for persistence of hand warts and itching in groin exacerbated by sweating and heat.     Labs:      Physical exam:  Vitals: There were no vitals taken for this visit.  GEN: well developed, well-nourished, in no acute distress, in a pleasant mood.     SKIN: Crane phototype 3  - Focused examination of the hands was performed.  - verrucous papules on hands  - No other lesions of concern on areas examined.     Past Medical History:   Past Medical History:   Diagnosis Date    BPH (benign prostatic hyperplasia)     Hyperlipidemia     Hypertension      Left ankle pain     Type 2 diabetes mellitus (H)      Past Surgical History:   Procedure Laterality Date    CHOLECYSTECTOMY          HERNIA REPAIR, UMBILICAL      ?       Social History:   reports that he has never smoked. He has never been exposed to tobacco smoke. He has never used smokeless tobacco. He reports current alcohol use. He reports that he does not use drugs.    Family History:  Family History   Problem Relation Age of Onset    No Known Problems Father          at age 105    Heart Failure Brother     Diabetes Type 2  Brother        Medications:  Current Outpatient Medications   Medication Sig Dispense Refill    acetaminophen (TYLENOL) 650 MG CR tablet Take 1 tablet (650 mg) by mouth every 8 hours as needed for mild pain or fever 90 tablet 0    acetaminophen (TYLENOL) 650 MG CR tablet Take 1 tablet (650 mg) by mouth every 8 hours as needed for mild pain or fever 90 tablet 3    alcohol swab prep pads Use to swab area of injection/loraine as directed. 100 each 3    Alcohol Swabs (ALCOHOL WIPES) 70 % PADS USE AS DIRECTED TO TEST BLOOD SUGARS 100 each 3    AREXVY injection       blood glucose (NO BRAND SPECIFIED) test strip Accu-Chek Guide test strips   TEST TWICE A DAY      blood glucose (NO BRAND SPECIFIED) test strip 1 each by Other route      blood glucose calibration (ONETOUCH VERIO) solution Use to calibrate blood glucose monitor as directed. 1 each 1    blood glucose monitoring (ONETOUCH VERIO IQ SYSTEM) meter device kit Use to test blood sugar 1 times daily. 1 kit 0    ciclopirox (PENLAC) 8 % external solution Apply to adjacent skin and affected nails daily.  Remove with alcohol every 7 days, then repeat. 6.6 mL 11    cyanocobalamin (VITAMIN B-12) 1000 MCG tablet Take 1 tablet (1,000 mcg) by mouth every other day 90 tablet 2    glimepiride (AMARYL) 1 MG tablet Take 1 tablet (1 mg) by mouth every morning (before breakfast) 90 tablet 1    hydrocortisone 2.5 % cream Apply topically 2 times  daily 30 g 0    imiquimod (ALDARA) 5 % external cream Apply nightly to warts, cover with band aid after application 24 packet 3    imiquimod (ALDARA) 5 % external cream Apply imiquimod to warts and cover with bandaid. Repeat nightly until resolved. Pause treatment if the area becomes very inflamed, resume when things are more calm 8 packet 4    ketoconazole (NIZORAL) 2 % external shampoo Wet affected area daily, apply shampoo and lather, let sit for 3-5 minutes and then rinse. 120 mL 11    lisinopril (ZESTRIL) 5 MG tablet Take 1 tablet (5 mg) by mouth daily 90 tablet 3    metFORMIN (GLUCOPHAGE) 500 MG tablet Take 1 tablet (500 mg) by mouth daily (with breakfast) 90 tablet 3    methyl salicylate-menthol (ICY HOT) ointment Apply topically every 6 hours as needed (arthirist pain) 99.2 g 1    ONETOUCH VERIO IQ test strip USE TO TEST BLOOD SUGAR1 TIMES DAILY. 100 strip 0    RESTASIS 0.05 % ophthalmic emulsion Place 1 drop into both eyes as needed      rosuvastatin (CRESTOR) 5 MG tablet Take 1 tablet (5 mg) by mouth daily (Patient taking differently: Take 5 mg by mouth daily Once a week or twice a week) 90 tablet 0    tamsulosin (FLOMAX) 0.4 MG capsule TAKE 1 CAPSULE BY MOUTH 2 TIMES DAILY. (Patient taking differently: Take 0.4 mg by mouth daily) 180 capsule 2    vitamin D3 (CHOLECALCIFEROL) 50 mcg (2000 units) tablet Take 1 tablet (50 mcg) by mouth daily 90 tablet 1     Allergies   Allergen Reactions    No Known Allergies

## 2024-07-29 NOTE — LETTER
7/29/2024      Jordan Miller  [address] [place] [address]      Dear Colleague,    Thank you for referring your patient, Jordan Miller, to the [facility]. Please see a copy of my visit note below.    [facility] Dermatology Note    Encounter Date: Jul 29, 2024    Dermatology Problem List:    ______________________________________    Impression/Plan:  Jordan was seen today for derm problem.    Diagnoses and all orders for this visit:    Other viral warts  -     imiquimod (ALDARA) 5 % external cream; Apply nightly to warts, cover with band aid after application  -     DESTRUCT BENIGN LESION, UP TO 14  - x4 R and L hand  - apply aldara nightly     Intertrigo  -     hydrocortisone 2.5 % cream; Apply topically 2 times daily  - rec moisture and friction reduction  - hydro cortisone BID PRN     Cryotherapy procedure note: After verbal consent and discussion of risks and benefits including but no limited to dyspigmentation/scar, blister, and pain, 4 warts on L and R hadn was(were) treated with 1-2mm freeze border for 2 cycles with liquid nitrogen. Post cryotherapy instructions were provided.     Follow-up PRN.       Staff Involved:  Staff Only    Jassi Zayas MD   of Dermatology  Department of Dermatology  [facility]      CC:   Chief Complaint   Patient presents with     Derm Problem     Follow up right hand        History of Present Illness:  Mr. Jordan Miller is a 69 year old male who presents as a return patient.    Last seen 10/2023 for warts. LN2 + imiquimod. Keto shampoo for seb derm. Presents today for persistence of hand warts and itching in groin exacerbated by sweating and heat.     Labs:      Physical exam:  Vitals: There were no vitals taken for this visit.  GEN: well developed, well-nourished, in no acute distress, in a pleasant mood.     SKIN: Crane phototype 3  - Focused examination of the hands  was performed.  - verrucous papules on hands  - No other lesions of concern on areas examined.     Past Medical History:   Past Medical History:   Diagnosis Date     BPH (benign prostatic hyperplasia)      Hyperlipidemia      Hypertension      Left ankle pain      Type 2 diabetes mellitus (H)      Past Surgical History:   Procedure Laterality Date     CHOLECYSTECTOMY           HERNIA REPAIR, UMBILICAL      ?       Social History:   reports that he has never smoked. He has never been exposed to tobacco smoke. He has never used smokeless tobacco. He reports current alcohol use. He reports that he does not use drugs.    Family History:  Family History   Problem Relation Age of Onset     No Known Problems Father          at age 105     Heart Failure Brother      Diabetes Type 2  Brother        Medications:  Current Outpatient Medications   Medication Sig Dispense Refill     acetaminophen (TYLENOL) 650 MG CR tablet Take 1 tablet (650 mg) by mouth every 8 hours as needed for mild pain or fever 90 tablet 0     acetaminophen (TYLENOL) 650 MG CR tablet Take 1 tablet (650 mg) by mouth every 8 hours as needed for mild pain or fever 90 tablet 3     alcohol swab prep pads Use to swab area of injection/loraine as directed. 100 each 3     Alcohol Swabs (ALCOHOL WIPES) 70 % PADS USE AS DIRECTED TO TEST BLOOD SUGARS 100 each 3     AREXVY injection        blood glucose (NO BRAND SPECIFIED) test strip Accu-Chek Guide test strips   TEST TWICE A DAY       blood glucose (NO BRAND SPECIFIED) test strip 1 each by Other route       blood glucose calibration (ONETOUCH VERIO) solution Use to calibrate blood glucose monitor as directed. 1 each 1     blood glucose monitoring (ONETOUCH VERIO IQ SYSTEM) meter device kit Use to test blood sugar 1 times daily. 1 kit 0     ciclopirox (PENLAC) 8 % external solution Apply to adjacent skin and affected nails daily.  Remove with alcohol every 7 days, then repeat. 6.6 mL 11     cyanocobalamin  (VITAMIN B-12) 1000 MCG tablet Take 1 tablet (1,000 mcg) by mouth every other day 90 tablet 2     glimepiride (AMARYL) 1 MG tablet Take 1 tablet (1 mg) by mouth every morning (before breakfast) 90 tablet 1     hydrocortisone 2.5 % cream Apply topically 2 times daily 30 g 0     imiquimod (ALDARA) 5 % external cream Apply nightly to warts, cover with band aid after application 24 packet 3     imiquimod (ALDARA) 5 % external cream Apply imiquimod to warts and cover with bandaid. Repeat nightly until resolved. Pause treatment if the area becomes very inflamed, resume when things are more calm 8 packet 4     ketoconazole (NIZORAL) 2 % external shampoo Wet affected area daily, apply shampoo and lather, let sit for 3-5 minutes and then rinse. 120 mL 11     lisinopril (ZESTRIL) 5 MG tablet Take 1 tablet (5 mg) by mouth daily 90 tablet 3     metFORMIN (GLUCOPHAGE) 500 MG tablet Take 1 tablet (500 mg) by mouth daily (with breakfast) 90 tablet 3     methyl salicylate-menthol (ICY HOT) ointment Apply topically every 6 hours as needed (arthirist pain) 99.2 g 1     ONETOUCH VERIO IQ test strip USE TO TEST BLOOD SUGAR1 TIMES DAILY. 100 strip 0     RESTASIS 0.05 % ophthalmic emulsion Place 1 drop into both eyes as needed       rosuvastatin (CRESTOR) 5 MG tablet Take 1 tablet (5 mg) by mouth daily (Patient taking differently: Take 5 mg by mouth daily Once a week or twice a week) 90 tablet 0     tamsulosin (FLOMAX) 0.4 MG capsule TAKE 1 CAPSULE BY MOUTH 2 TIMES DAILY. (Patient taking differently: Take 0.4 mg by mouth daily) 180 capsule 2     vitamin D3 (CHOLECALCIFEROL) 50 mcg (2000 units) tablet Take 1 tablet (50 mcg) by mouth daily 90 tablet 1     Allergies   Allergen Reactions     No Known Allergies                Again, thank you for allowing me to participate in the care of your patient.        Sincerely,        Jassi Zayas MD

## 2024-07-29 NOTE — PATIENT INSTRUCTIONS
Apply imiquimod to warts nightly    Apply hydrocortisone to groin twice daily as needed for itch  - try your best to keep area dry  - utilize underwear that prevents skin on skin friction     CRYOTHERAPY    What is it?  Use of a very cold liquid, such as liquid nitrogen, to freeze and destroy abnormal skin cells that need to be removed    What should I expect?  Tenderness and redness  A small blister that might grow and fill with dark purple blood.  More than one treatment may be needed if the lesions do not go away.    How do I care for the treated area?  Gently wash the area with your hands when bathing.  Use a thin layer of VaselineÆ to help with healing.   The area should heal within 7-10 days.  Do not use an antibiotic or NeosporinÆ ointment.   You may take acetaminophen (TylenolÆ) for pain.     Call your Doctor if you have:  Severe pain  Signs of infection (warmth, redness, cloudy yellow drainage, and or a bad smell)  Questions or concerns

## 2024-08-05 ENCOUNTER — THERAPY VISIT (OUTPATIENT)
Dept: PHYSICAL THERAPY | Facility: REHABILITATION | Age: 69
End: 2024-08-05
Attending: FAMILY MEDICINE
Payer: COMMERCIAL

## 2024-08-05 DIAGNOSIS — M75.41 IMPINGEMENT SYNDROME, SHOULDER, RIGHT: Primary | ICD-10-CM

## 2024-08-05 PROCEDURE — 97110 THERAPEUTIC EXERCISES: CPT | Mod: GP

## 2024-08-05 PROCEDURE — 97140 MANUAL THERAPY 1/> REGIONS: CPT | Mod: GP

## 2024-08-12 ENCOUNTER — THERAPY VISIT (OUTPATIENT)
Dept: PHYSICAL THERAPY | Facility: REHABILITATION | Age: 69
End: 2024-08-12
Attending: FAMILY MEDICINE
Payer: COMMERCIAL

## 2024-08-12 DIAGNOSIS — M75.41 IMPINGEMENT SYNDROME, SHOULDER, RIGHT: Primary | ICD-10-CM

## 2024-08-12 PROCEDURE — 97140 MANUAL THERAPY 1/> REGIONS: CPT | Mod: GP

## 2024-08-12 PROCEDURE — 97110 THERAPEUTIC EXERCISES: CPT | Mod: GP

## 2024-08-13 ENCOUNTER — PATIENT OUTREACH (OUTPATIENT)
Dept: GERIATRIC MEDICINE | Facility: CLINIC | Age: 69
End: 2024-08-13
Payer: COMMERCIAL

## 2024-08-13 NOTE — PROGRESS NOTES
[facility] Care Coordination Contact    Called member to schedule annual HRA home visit. HRA has been scheduled for 8/22/2024 at 2:00 pm.    Maricarmen Penny RN, BSN, PHN  [facility]  Phone: [phone]

## 2024-08-19 DIAGNOSIS — I10 HYPERTENSION, UNSPECIFIED TYPE: ICD-10-CM

## 2024-08-19 RX ORDER — GLIMEPIRIDE 1 MG/1
1 TABLET ORAL
Qty: 90 TABLET | Refills: 1 | Status: SHIPPED | OUTPATIENT
Start: 2024-08-19

## 2024-08-22 ENCOUNTER — PATIENT OUTREACH (OUTPATIENT)
Dept: GERIATRIC MEDICINE | Facility: CLINIC | Age: 69
End: 2024-08-22

## 2024-08-22 ENCOUNTER — LAB (OUTPATIENT)
Dept: LAB | Facility: CLINIC | Age: 69
End: 2024-08-22
Payer: COMMERCIAL

## 2024-08-22 DIAGNOSIS — E11.9 TYPE 2 DIABETES MELLITUS WITHOUT COMPLICATION, WITHOUT LONG-TERM CURRENT USE OF INSULIN (H): ICD-10-CM

## 2024-08-22 LAB — HBA1C MFR BLD: 6.1 % (ref 0–5.6)

## 2024-08-22 PROCEDURE — 83036 HEMOGLOBIN GLYCOSYLATED A1C: CPT

## 2024-08-22 PROCEDURE — 36415 COLL VENOUS BLD VENIPUNCTURE: CPT

## 2024-08-22 NOTE — Clinical Note
Justino Han,   Just an FYI. Please see notes from annual reassessment visit. Let me know if you have any questions.   Thank you, Maricarmen Penny RN, BSN, PHN [facility] Phone: [phone]

## 2024-08-26 ENCOUNTER — THERAPY VISIT (OUTPATIENT)
Dept: PHYSICAL THERAPY | Facility: REHABILITATION | Age: 69
End: 2024-08-26
Payer: COMMERCIAL

## 2024-08-26 DIAGNOSIS — M75.41 IMPINGEMENT SYNDROME, SHOULDER, RIGHT: Primary | ICD-10-CM

## 2024-08-26 PROCEDURE — 97140 MANUAL THERAPY 1/> REGIONS: CPT | Mod: GP

## 2024-08-26 PROCEDURE — 97110 THERAPEUTIC EXERCISES: CPT | Mod: GP

## 2024-08-29 ASSESSMENT — ACTIVITIES OF DAILY LIVING (ADL): DEPENDENT_IADLS:: INDEPENDENT

## 2024-09-04 ENCOUNTER — THERAPY VISIT (OUTPATIENT)
Dept: PHYSICAL THERAPY | Facility: REHABILITATION | Age: 69
End: 2024-09-04
Payer: COMMERCIAL

## 2024-09-04 DIAGNOSIS — M75.41 IMPINGEMENT SYNDROME, SHOULDER, RIGHT: Primary | ICD-10-CM

## 2024-09-04 PROCEDURE — 97110 THERAPEUTIC EXERCISES: CPT | Mod: GP

## 2024-09-04 PROCEDURE — 97140 MANUAL THERAPY 1/> REGIONS: CPT | Mod: GP

## 2024-09-10 ENCOUNTER — PATIENT OUTREACH (OUTPATIENT)
Dept: GERIATRIC MEDICINE | Facility: CLINIC | Age: 69
End: 2024-09-10
Payer: COMMERCIAL

## 2024-09-10 DIAGNOSIS — I10 HYPERTENSION, UNSPECIFIED TYPE: Primary | ICD-10-CM

## 2024-09-10 NOTE — PROGRESS NOTES
[facility] Care Coordination Contact    [facility] Home Visit Assessment     Home visit for Health Risk Assessment with Jordan Miller completed on August 22, 2024    Type of residence:: Private home - stairs  Current living arrangement:: I live in a private home with family     Assessment completed with:: Patient, Care Team Member, Spouse or significant other    Current Care Plan  Member currently receiving the following home care services:     Member currently receiving the following community resources: None      Medication Review  Medication reconciliation completed in Epic: Yes  Medication set-up & administration: Independent-does not set up.  Self-administers medications.  Medication Risk Assessment Medication (1 or more, place referral to MTM): N/A: No risk factors identified  MTM Referral Placed: No: No risk factors idenified    Mental/Behavioral Health   Depression Screening:   PHQ-2 Total Score (Adult) - Positive if 3 or more points; Administer PHQ-9 if positive: 0       Mental health DX:: No        Falls Assessment:   Fallen 2 or more times in the past year?: No   Any fall with injury in the past year?: No    ADL/IADL Dependencies:   Dependent ADLs:: Independent  Dependent IADLs:: Independent    Health Plan sponsored benefits: [employer]: Shared information regarding [employer]. Reviewed preventative health screening and health plan supplemental benefits/incentives. Reviewed medication disposal form.    PCA Assessment completed at visit: Not Applicable     Elderly Waiver Eligibility: No-does not meet criteria    Care Plan & Recommendations:   Jordan continues to remain independent in his home. He continues to live with his daughter and would like to remain living with his daughter. He goes to [place] at least once a year during the winter and return when the weather is warm in [place].     He remains active by going to the gym daily. No concerns. He is proactive with his healthcare. He is  followed by PCP and cardiology.     Jordan reports his DM is managed and is able to get his supplies to check his sugars daily.     He would like to monitor his BP at home and asking if he can get a BP machine. CC placed DME order to PCP to approve and will coordinate delivery. Jordan reports has never received one through insurance before (or at least the last 5 years).       See [facility] Assessment for detailed assessment information.    Follow-Up Plan: Member informed of future contact, plan to f/u with member with a 6 month telephone assessment.  Contact information shared with member and family, encouraged member to call with any questions or concerns at any time.    [place] care continuum providers: Please see Snapshot and Care Management Flowsheets for Specific details of care plan.    This CC note routed to PCP, Mira Han.      Maricarmen Penny RN, BSN, PHN  [facility]  Phone: [phone]

## 2024-09-10 NOTE — PROGRESS NOTES
[facility] Care Coordination Contact  DME supply(s) have been requested by the patient. DME orders(s) have been queued up and pended for the provider to review. Patient reports the need for DME supplies due to HTN. Once completed, please route the encounter to care coordinator, Maricarmen Penny, to fax completed documentation and order requisition to [facility].     Maricarmen Penny RN, BSN, PHN  [facility]  Phone: [phone]    '

## 2024-09-12 NOTE — PROGRESS NOTES
[facility] Care Coordination Contact          CC emailed DME order to [facility].    Maricarmen Penny RN, BSN, PHN  [facility]  Phone: [phone]

## 2024-09-13 ENCOUNTER — PATIENT OUTREACH (OUTPATIENT)
Dept: GERIATRIC MEDICINE | Facility: CLINIC | Age: 69
End: 2024-09-13
Payer: COMMERCIAL

## 2024-09-13 NOTE — LETTER
September 13, 2024       Jordan Miller  [address] [place] [address]      Dear Jordan,    At [facility], we re dedicated to improving your health and wellness. Enclosed is the Support Plan developed with you on 8/22/24. Please review the Support Plan carefully.    As a reminder, during your visit we talked about:   Ways to manage your physical and mental health   Using health care to maintain and improve your health    Your preventive care needs      Remember to contact your care coordinator if you:   Are hospitalized or plan to be hospitalized    Have a fall     Have a change in your physical or mental health   Need help finding support or services    If you have questions or don t agree with your Support Plan, call me at [phone]. You can also call me if your needs change. TTY users call the [place] Rel[employer] at 324 or 1-686.150.2097 (hghwdb-wm-kozxyc relay service).    Sincerely,         Maricarmen Penny RN, N  [phone]  [email or web address]      [employer] [employer]                [id number] accepted     (06/2024)                [address], [place] [address]  [phone]  fax [phone]  [facility]

## 2024-09-13 NOTE — PROGRESS NOTES
[facility] Care Coordination Contact    Received after visit chart from care coordinator.  Completed following tasks: Mailed copy of support plan to member, Mailed [place] Choices signature sheet pages 3-4, Mailed Safe Medication Disposal , and Updated services in Database    CC placed order for BP Unit.     Sara Russell  [facility]  Case Management Specialist  [phone]

## 2024-09-19 ENCOUNTER — OFFICE VISIT (OUTPATIENT)
Dept: FAMILY MEDICINE | Facility: CLINIC | Age: 69
End: 2024-09-19
Attending: FAMILY MEDICINE
Payer: COMMERCIAL

## 2024-09-19 VITALS
SYSTOLIC BLOOD PRESSURE: 124 MMHG | BODY MASS INDEX: 30.51 KG/M2 | DIASTOLIC BLOOD PRESSURE: 58 MMHG | HEART RATE: 76 BPM | WEIGHT: 189 LBS | OXYGEN SATURATION: 96 %

## 2024-09-19 DIAGNOSIS — N13.8 BENIGN PROSTATIC HYPERPLASIA WITH URINARY OBSTRUCTION: ICD-10-CM

## 2024-09-19 DIAGNOSIS — E11.9 TYPE 2 DIABETES MELLITUS WITHOUT COMPLICATION, WITHOUT LONG-TERM CURRENT USE OF INSULIN (H): ICD-10-CM

## 2024-09-19 DIAGNOSIS — M25.511 CHRONIC RIGHT SHOULDER PAIN: ICD-10-CM

## 2024-09-19 DIAGNOSIS — N40.1 BENIGN PROSTATIC HYPERPLASIA WITH URINARY OBSTRUCTION: ICD-10-CM

## 2024-09-19 DIAGNOSIS — G89.29 CHRONIC RIGHT SHOULDER PAIN: ICD-10-CM

## 2024-09-19 DIAGNOSIS — N32.81 OAB (OVERACTIVE BLADDER): Primary | ICD-10-CM

## 2024-09-19 DIAGNOSIS — I10 HYPERTENSION, UNSPECIFIED TYPE: ICD-10-CM

## 2024-09-19 DIAGNOSIS — R79.89 LOW VITAMIN B12 LEVEL: ICD-10-CM

## 2024-09-19 LAB
ALBUMIN UR-MCNC: NEGATIVE MG/DL
APPEARANCE UR: CLEAR
BILIRUB UR QL STRIP: NEGATIVE
COLOR UR AUTO: YELLOW
GLUCOSE UR STRIP-MCNC: NEGATIVE MG/DL
HGB UR QL STRIP: NEGATIVE
KETONES UR STRIP-MCNC: NEGATIVE MG/DL
LEUKOCYTE ESTERASE UR QL STRIP: NEGATIVE
NITRATE UR QL: NEGATIVE
PH UR STRIP: 7 [PH] (ref 5–8)
SP GR UR STRIP: 1.01 (ref 1–1.03)
UROBILINOGEN UR STRIP-ACNC: 0.2 E.U./DL

## 2024-09-19 PROCEDURE — 81003 URINALYSIS AUTO W/O SCOPE: CPT | Performed by: FAMILY MEDICINE

## 2024-09-19 PROCEDURE — 99214 OFFICE O/P EST MOD 30 MIN: CPT | Performed by: FAMILY MEDICINE

## 2024-09-19 PROCEDURE — G2211 COMPLEX E/M VISIT ADD ON: HCPCS | Performed by: FAMILY MEDICINE

## 2024-09-19 RX ORDER — LANOLIN ALCOHOL/MO/W.PET/CERES
1000 CREAM (GRAM) TOPICAL EVERY OTHER DAY
Qty: 90 TABLET | Refills: 2 | Status: SHIPPED | OUTPATIENT
Start: 2024-09-19

## 2024-09-19 RX ORDER — FINASTERIDE 5 MG/1
5 TABLET, FILM COATED ORAL DAILY
Qty: 90 TABLET | Refills: 1 | Status: SHIPPED | OUTPATIENT
Start: 2024-09-19

## 2024-09-19 RX ORDER — CHOLECALCIFEROL (VITAMIN D3) 50 MCG
1 TABLET ORAL DAILY
Qty: 90 TABLET | Refills: 1 | Status: SHIPPED | OUTPATIENT
Start: 2024-09-19

## 2024-09-19 NOTE — PATIENT INSTRUCTIONS
Justino Ortega,    Today your urine showing no infection   I reviewed the notes from [place] [facility] which you had seen last year.  Per their recommendation medical treatment versus surgery.  For now they recommend medication I will add the finasteride which they suggested to add to the Flomax.  And like you already have appointment with urology on 11/5 for lab for PSA and urine and then 11/12 for urologist  Your urine test showing noes concern for kidney problems  For now start taking new medicine called finasteride 5 mg with your Flomax every day and keep your urologist appointment    Mira Han MD

## 2024-10-10 NOTE — PROGRESS NOTES
"    DISCHARGE  Reason for Discharge: Patient has met all goals.  Patient chooses to discontinue therapy.    Equipment Issued: NA    Discharge Plan: Patient to continue home program.    Referring Provider:  Mira Han        09/04/24 0500   Appointment Info   Signing clinician's name / credentials Andreea Sharma PT   Total/Authorized Visits 12   Visits Used 5   Medical Diagnosis Impingement syndrome, shoulder, right   PT Tx Diagnosis Right shoulder pain, weakness, motion deficits, poor posture   Progress Note/Certification   Start of Care Date 07/26/24   Onset of illness/injury or Date of Surgery 07/24/24  (order  date)   Therapy Frequency 1 x / week   Predicted Duration 90 days   Certification date from 07/26/24   Certification date to 10/24/24   Progress Note Due Date 09/27/24   Progress Note Completed Date 07/26/24   GOALS   PT Goals 2;3   PT Goal 1   Goal Identifier HEP   Goal Description Pt will demonstrate understanding and independece of HEP in 30 days.   Rationale to maximize safety and independence with performance of ADLs and functional tasks   Goal Progress MET   Target Date 08/24/24   Date Met 09/04/24   PT Goal 2   Goal Identifier ROM   Goal Description Pt will improve R  shoulder  AROM to match contralateral side by the end of therapy in order to improve functional ADLs   Goal Progress MET   Target Date 10/24/24   Date Met 09/04/24   PT Goal 3   Goal Identifier strength   Goal Description Pt will improve R sided strength by 2 grades by the end of therapy in order to return  to previous weight lifting routine.   Goal Progress MET   Target Date 10/24/24   Date Met 09/04/24   Subjective Report   Subjective Report Pt reports he has  improved significantly since  starting therapy. He feels  he is back to his baseline  and is able to  function at \"100%\". He has continued  with his ex.   Objective Measures   Objective Measures Objective Measure 1;Objective Measure 2;Objective Measure 3;Objective Measure 4 "   Objective Measure 1   Objective Measure SPADI   Details on 7/26/24 56.92, on 9/4/24: 0   Objective Measure 2   Objective Measure R  IR/ER   Details on 8/12/24: IR: 60 degrees, ER: 80 degrees, on 9/4/24: ER: 84 degrees, IR: to stomach   Objective Measure 3   Objective Measure R shoulder AROM   Details on 9/4/24: flex- 156 degrees, ABD- 158 degrees   Objective Measure 4   Objective Measure R shoulder MMT   Details on  8/4/24: 5/5 strength all motions   Treatment Interventions (PT)   Interventions Therapeutic Procedure/Exercise;Manual Therapy;Neuromuscular Re-education   Therapeutic Procedure/Exercise   Therapeutic Procedures: strength, endurance, ROM, flexibility minutes (25318) 10   Therapeutic Procedures Ther Proc 2;Ther Proc 3;Ther Proc 4;Ther Proc 5;Ther Proc 6   Ther Proc 1 UBE: x 4 min fwd/bkwd   Ther Proc 1 - Details NT: Pulley: flex and abd x 10-15 reps   Ther Proc 2 VR: Wall taps: x 10 B, green gym TB   Ther Proc 2 - Details verbal review: Rows/shoulder ext: x 10 reps, green TB for HEP, cues for shoulders  down and proper form   Ther Proc 3 verbal review: AAROM: supine flexion x   Ther Proc 3 - Details verbal review: Press ups   Ther Proc 4 VR: standing shoulder horizontal  ABD: x 10  reps, green gym TB   Ther Proc 4 - Details NT: standing Y and T: 2# x 10 reps each, cues for proper form   Ther Proc 5 NT: wall alphabet: x 1 set, no pain   Ther Proc 5 - Details NT: sidelying ER: no weight - x 10 reps on R   PTRx Ther Proc 1 NT: Passive Shoulder Flexion Sitting   PTRx Ther Proc 1 - Details x 10 reps on R   PTRx Ther Proc 2 NT: Passive Shoulder Abduction   PTRx Ther Proc 2 - Details x 10 reps on R   PTRx Ther Proc 3 NT: Wall Slide Shoulder Flexion   PTRx Ther Proc 3 - Details x 10 reps on R   PTRx Ther Proc 4 NT: Wall Slides Abduction   PTRx Ther Proc 4 - Details x 10 reps on R   PTRx Ther Proc 5 NT: Wand Shoulder External Rotation in Neutral   PTRx Ther Proc 5 - Details x 10 reps on R   PTRx Ther Proc 6 NT:  [place] Shoulder Internal Rotation   PTRx Ther Proc 6 - Details x 10 reps on R   Skilled Intervention Exercises to help improve R shoulder mobility, discussed therapy POC, see obj   Patient Response/Progress pt demonstrated understanding   Ther Proc 6 Edu pt on performing ex  3-4 x per week to maintain his progress, but encouraged  him to not  over do  ex either in  order to avoid  overuse  injury   Neuromuscular Re-education   Neuro Re-ed 1 Supine shoulder flex/ext and med/lat  w/ blue ball in supine: 2  sets x 10 reps   Neuro Re-ed 1 - Details Body blade: x 20-30 seconds -  shoulder 90/90: med/lat, sup/inf, fwd/bkwd   Skilled Intervention Instreucted pt in proper form of ex   Patient Response/Progress pt tolerated well, no pain   Manual Therapy   Manual Therapy: Mobilization, MFR, MLD, friction massage minutes (43035) 13   Manual Therapy 1 MFR, STM to pts R deltoid, bicep and tricep in supine   Manual Therapy 1 - Details NT: PROM: supine -  flex, abd, IR, ER   Skilled Intervention MT for release of taught bands and relief of symptoms   Patient Response/Progress pt reported feeling  good at  the end of  session   Education   Learner/Method Patient;Demonstration   Plan   Home program PTRx   Plan for next session Pt will trial independent symptom management and HEP. PT will keep his chart open for the next ~30 days, if pt does not schedule in between now and then PT will close chart.   Comments   Comments Assessment: Pt returns to PT for his 5th visit. He has made significant improvements in his overall symptoms since  starting therapy.  Today pt demonstrates improved AROM, strength and  SPADI   score.  He is able to  sleep on his affected side pain free and has been able  to  return  to his previous activites  pain  free. We  continued MT today as pt reported this continues to help  improve his pain  symptoms. Pt will trial independent symptom management and HEP. PT will keep his chart open for the next ~30 days, if pt  does not schedule in between now and then PT will close chart.   Total Session Time   Timed Code Treatment Minutes 23   Total Treatment Time (sum of timed and untimed services) 23

## 2024-11-12 ENCOUNTER — TRANSFERRED RECORDS (OUTPATIENT)
Dept: HEALTH INFORMATION MANAGEMENT | Facility: CLINIC | Age: 69
End: 2024-11-12
Payer: COMMERCIAL

## 2024-11-24 ENCOUNTER — HEALTH MAINTENANCE LETTER (OUTPATIENT)
Age: 69
End: 2024-11-24

## 2024-12-04 ENCOUNTER — LAB (OUTPATIENT)
Dept: LAB | Facility: CLINIC | Age: 69
End: 2024-12-04
Payer: COMMERCIAL

## 2024-12-04 DIAGNOSIS — E11.9 TYPE 2 DIABETES MELLITUS WITHOUT COMPLICATION, WITHOUT LONG-TERM CURRENT USE OF INSULIN (H): Primary | ICD-10-CM

## 2024-12-04 DIAGNOSIS — E11.9 TYPE 2 DIABETES MELLITUS WITHOUT COMPLICATION, WITHOUT LONG-TERM CURRENT USE OF INSULIN (H): ICD-10-CM

## 2024-12-04 LAB
CREAT UR-MCNC: 23.6 MG/DL
EST. AVERAGE GLUCOSE BLD GHB EST-MCNC: 143 MG/DL
HBA1C MFR BLD: 6.6 % (ref 0–5.6)
MICROALBUMIN UR-MCNC: <12 MG/L
MICROALBUMIN/CREAT UR: NORMAL MG/G{CREAT}

## 2024-12-04 PROCEDURE — 83036 HEMOGLOBIN GLYCOSYLATED A1C: CPT

## 2024-12-04 PROCEDURE — 36415 COLL VENOUS BLD VENIPUNCTURE: CPT

## 2024-12-04 PROCEDURE — 82570 ASSAY OF URINE CREATININE: CPT

## 2024-12-04 PROCEDURE — 82043 UR ALBUMIN QUANTITATIVE: CPT

## 2024-12-14 ENCOUNTER — MYC REFILL (OUTPATIENT)
Dept: FAMILY MEDICINE | Facility: CLINIC | Age: 69
End: 2024-12-14
Payer: COMMERCIAL

## 2024-12-14 ENCOUNTER — MYC REFILL (OUTPATIENT)
Dept: DERMATOLOGY | Facility: CLINIC | Age: 69
End: 2024-12-14
Payer: COMMERCIAL

## 2024-12-14 DIAGNOSIS — N40.1 BENIGN PROSTATIC HYPERPLASIA WITH URINARY OBSTRUCTION: ICD-10-CM

## 2024-12-14 DIAGNOSIS — B07.8 OTHER VIRAL WARTS: ICD-10-CM

## 2024-12-14 DIAGNOSIS — L30.4 INTERTRIGO: ICD-10-CM

## 2024-12-14 DIAGNOSIS — N13.8 BENIGN PROSTATIC HYPERPLASIA WITH URINARY OBSTRUCTION: ICD-10-CM

## 2024-12-14 DIAGNOSIS — E11.9 TYPE 2 DIABETES MELLITUS WITHOUT COMPLICATION, WITHOUT LONG-TERM CURRENT USE OF INSULIN (H): ICD-10-CM

## 2024-12-14 DIAGNOSIS — M17.0 PRIMARY OSTEOARTHRITIS OF BOTH KNEES: ICD-10-CM

## 2024-12-16 RX ORDER — IMIQUIMOD 12.5 MG/.25G
CREAM TOPICAL
Qty: 24 PACKET | Refills: 3 | Status: SHIPPED | OUTPATIENT
Start: 2024-12-16

## 2024-12-16 RX ORDER — HYDROCORTISONE 25 MG/G
CREAM TOPICAL 2 TIMES DAILY
Qty: 30 G | Refills: 0 | Status: SHIPPED | OUTPATIENT
Start: 2024-12-16

## 2024-12-16 RX ORDER — SENNOSIDES 8.6 MG
650 CAPSULE ORAL EVERY 8 HOURS PRN
Qty: 90 TABLET | Refills: 0 | Status: SHIPPED | OUTPATIENT
Start: 2024-12-16

## 2024-12-16 RX ORDER — FINASTERIDE 5 MG/1
5 TABLET, FILM COATED ORAL DAILY
Qty: 90 TABLET | Refills: 1 | OUTPATIENT
Start: 2024-12-16

## 2024-12-16 NOTE — TELEPHONE ENCOUNTER
Routing to provider for approval/denial since med not on protocol    Alison DIANA RN  Dermatology   [phone]

## 2024-12-16 NOTE — TELEPHONE ENCOUNTER
Routing to provider for approval/denial since med not on protocol    Alison DIANA RN  Dermatology   [phone]

## 2024-12-17 ENCOUNTER — MYC REFILL (OUTPATIENT)
Dept: FAMILY MEDICINE | Facility: CLINIC | Age: 69
End: 2024-12-17
Payer: COMMERCIAL

## 2024-12-17 DIAGNOSIS — H04.123 DRY EYES: Primary | ICD-10-CM

## 2024-12-17 DIAGNOSIS — B35.1 DERMATOPHYTOSIS OF NAIL: ICD-10-CM

## 2024-12-17 DIAGNOSIS — L21.9 DERMATITIS, SEBORRHEIC: ICD-10-CM

## 2024-12-17 RX ORDER — CICLOPIROX 80 MG/ML
SOLUTION TOPICAL
Qty: 6.6 ML | Refills: 11 | Status: SHIPPED | OUTPATIENT
Start: 2024-12-17

## 2024-12-17 RX ORDER — CYCLOSPORINE 0.5 MG/ML
1 EMULSION OPHTHALMIC PRN
Qty: 5.5 ML | Refills: 1 | Status: SHIPPED | OUTPATIENT
Start: 2024-12-17

## 2024-12-18 RX ORDER — KETOCONAZOLE 20 MG/ML
SHAMPOO, SUSPENSION TOPICAL
Qty: 120 ML | Refills: 11 | Status: SHIPPED | OUTPATIENT
Start: 2024-12-18

## 2024-12-19 ENCOUNTER — TELEPHONE (OUTPATIENT)
Dept: FAMILY MEDICINE | Facility: CLINIC | Age: 69
End: 2024-12-19

## 2024-12-19 DIAGNOSIS — N40.1 BENIGN PROSTATIC HYPERPLASIA WITH URINARY OBSTRUCTION: ICD-10-CM

## 2024-12-19 DIAGNOSIS — N13.8 BENIGN PROSTATIC HYPERPLASIA WITH URINARY OBSTRUCTION: ICD-10-CM

## 2024-12-19 DIAGNOSIS — N32.81 OAB (OVERACTIVE BLADDER): Primary | ICD-10-CM

## 2024-12-19 RX ORDER — MIRABEGRON 25 MG/1
25 TABLET, FILM COATED, EXTENDED RELEASE ORAL DAILY
Qty: 90 TABLET | Refills: 1 | Status: SHIPPED | OUTPATIENT
Start: 2024-12-19

## 2024-12-19 NOTE — TELEPHONE ENCOUNTER
Patient: Genaro Ortiz    Procedure: Procedure(s):  ARTHROPLASTY, HIP, TOTAL LEFT       Diagnosis: Osteoarthritis of both hips, unspecified osteoarthritis type [M16.0]  Diagnosis Additional Information: No value filed.    Anesthesia Type:   General     Note:    Oropharynx: oropharynx clear of all foreign objects and spontaneously breathing  Level of Consciousness: awake  Oxygen Supplementation: face mask  Level of Supplemental Oxygen (L/min / FiO2): 10  Independent Airway: airway patency satisfactory and stable  Dentition: dentition unchanged  Vital Signs Stable: post-procedure vital signs reviewed and stable  Report to RN Given: handoff report given  Patient transferred to: PACU    Handoff Report: Identifed the Patient, Identified the Reponsible Provider, Reviewed the pertinent medical history, Discussed the surgical course, Reviewed Intra-OP anesthesia mangement and issues during anesthesia, Set expectations for post-procedure period and Allowed opportunity for questions and acknowledgement of understanding      Vitals:  Vitals Value Taken Time   /70 07/21/23 1415   Temp     Pulse 89 07/21/23 1419   Resp 10 07/21/23 1419   SpO2 99 % 07/21/23 1419   Vitals shown include unvalidated device data.    Electronically Signed By: Ale Johnson CRNA, NATALYA JACK  July 21, 2023  2:19 PM   12/19/24  Please advise.  Mary

## 2024-12-19 NOTE — TELEPHONE ENCOUNTER
Patient came to clinic to schedule same-day appointment, nothing available today or until 1/14. Patient is requesting Sildosin mg and Mirabegron 50 be prescribed for urinary symptoms. Advised patient visit wilth provider would be needed. Patient did not want to schedule with another provider or try an e-visit, he wanted a message sent.

## 2024-12-19 NOTE — TELEPHONE ENCOUNTER
Sorry I meant to say that I will prescribe urinary incontinence medicine but will wait for Viagra  Send the prescription for mirabegron

## 2024-12-19 NOTE — TELEPHONE ENCOUNTER
12/19/24  Spoke with pt and relayed message. Pt states the medication is urgent. Pt also wants Dr. Han to fit him in for appt for urinary issues. Pt refuses to see another provider. Please advise if able to work him in.  Mary

## 2024-12-19 NOTE — TELEPHONE ENCOUNTER
Let patient know that I cannot prescribe mirabegron to help with urine frequency and incontinence  But for Viagra  we will have him wait for actual appointment  Let patient be aware that prescription might not be covered by insurance and could be expensive  If he like we can always have him see a urologist    Mira Han MD

## 2024-12-21 ENCOUNTER — MYC MEDICAL ADVICE (OUTPATIENT)
Dept: FAMILY MEDICINE | Facility: CLINIC | Age: 69
End: 2024-12-21
Payer: COMMERCIAL

## 2024-12-22 ENCOUNTER — MYC REFILL (OUTPATIENT)
Dept: FAMILY MEDICINE | Facility: CLINIC | Age: 69
End: 2024-12-22
Payer: COMMERCIAL

## 2024-12-22 DIAGNOSIS — M17.0 PRIMARY OSTEOARTHRITIS OF BOTH KNEES: ICD-10-CM

## 2024-12-24 RX ORDER — SILODOSIN 8 MG/1
8 CAPSULE ORAL DAILY
Qty: 90 CAPSULE | Refills: 3 | Status: SHIPPED | OUTPATIENT
Start: 2024-12-24

## 2024-12-24 RX ORDER — SENNOSIDES 8.6 MG
650 CAPSULE ORAL EVERY 8 HOURS PRN
Qty: 90 TABLET | Refills: 0 | Status: SHIPPED | OUTPATIENT
Start: 2024-12-24

## 2024-12-24 NOTE — TELEPHONE ENCOUNTER
Provider prescribed Silodosin (RAPAFLO) 8 MG CAPS capsule for patient.  RN called to let patient know the medication has been sent to his pharmacy.    Mag Monk RN

## 2024-12-24 NOTE — TELEPHONE ENCOUNTER
Preet Ortega, my name is Elpidio Alejandro MD and I am covering for Mira Han who is out of the office today.      I have sent a prescription for silodosin 8 mg, but he should not take tamsulosin and silodosin together as they have the same mechanism of action.    Elpidio Alejandro MD

## 2025-01-15 ENCOUNTER — OFFICE VISIT (OUTPATIENT)
Dept: FAMILY MEDICINE | Facility: CLINIC | Age: 70
End: 2025-01-15
Payer: COMMERCIAL

## 2025-01-15 VITALS
DIASTOLIC BLOOD PRESSURE: 79 MMHG | SYSTOLIC BLOOD PRESSURE: 128 MMHG | TEMPERATURE: 97.7 F | BODY MASS INDEX: 31.81 KG/M2 | RESPIRATION RATE: 12 BRPM | HEART RATE: 77 BPM | WEIGHT: 197.9 LBS | HEIGHT: 66 IN | OXYGEN SATURATION: 98 %

## 2025-01-15 DIAGNOSIS — R35.0 BENIGN PROSTATIC HYPERPLASIA WITH URINARY FREQUENCY: Primary | ICD-10-CM

## 2025-01-15 DIAGNOSIS — I10 PRIMARY HYPERTENSION: ICD-10-CM

## 2025-01-15 DIAGNOSIS — E11.9 TYPE 2 DIABETES MELLITUS WITHOUT COMPLICATION, WITHOUT LONG-TERM CURRENT USE OF INSULIN (H): ICD-10-CM

## 2025-01-15 DIAGNOSIS — N40.1 BENIGN PROSTATIC HYPERPLASIA WITH URINARY FREQUENCY: Primary | ICD-10-CM

## 2025-01-15 DIAGNOSIS — N32.81 OVERACTIVE BLADDER: ICD-10-CM

## 2025-01-15 PROCEDURE — 99213 OFFICE O/P EST LOW 20 MIN: CPT | Performed by: FAMILY MEDICINE

## 2025-01-15 RX ORDER — MIRABEGRON 50 MG/1
50 TABLET, FILM COATED, EXTENDED RELEASE ORAL DAILY
Qty: 90 TABLET | Refills: 2 | COMMUNITY
Start: 2025-01-15

## 2025-01-15 RX ORDER — MIRABEGRON 50 MG/1
1 TABLET, FILM COATED, EXTENDED RELEASE ORAL 2 TIMES DAILY
COMMUNITY
Start: 2025-01-04 | End: 2025-01-15

## 2025-01-15 NOTE — PATIENT INSTRUCTIONS
Patient Education   Preventive Care Advice   This is general advice given by our system to help you stay healthy. However, your care team may have specific advice just for you. Please talk to your care team about your preventive care needs.  Nutrition  Eat 5 or more servings of fruits and vegetables each day.  Try wheat bread, brown rice and whole grain pasta (instead of white bread, rice, and pasta).  Get enough calcium and vitamin D. Check the label on foods and aim for 100% of the RDA (recommended daily allowance).  Lifestyle  Exercise at least 150 minutes each week  (30 minutes a day, 5 days a week).  Do muscle strengthening activities 2 days a week. These help control your weight and prevent disease.  No smoking.  Wear sunscreen to prevent skin cancer.  Have a dental exam and cleaning every 6 months.  Yearly exams  See your health care team every year to talk about:  Any changes in your health.  Any medicines your care team has prescribed.  Preventive care, family planning, and ways to prevent chronic diseases.  Shots (vaccines)   HPV shots (up to age 26), if you've never had them before.  Hepatitis B shots (up to age 59), if you've never had them before.  COVID-19 shot: Get this shot when it's due.  Flu shot: Get a flu shot every year.  Tetanus shot: Get a tetanus shot every 10 years.  Pneumococcal, hepatitis A, and RSV shots: Ask your care team if you need these based on your risk.  Shingles shot (for age 50 and up)  General health tests  Diabetes screening:  Starting at age 35, Get screened for diabetes at least every 3 years.  If you are younger than age 35, ask your care team if you should be screened for diabetes.  Cholesterol test: At age 39, start having a cholesterol test every 5 years, or more often if advised.  Bone density scan (DEXA): At age 50, ask your care team if you should have this scan for osteoporosis (brittle bones).  Hepatitis C: Get tested at least once in your life.  STIs (sexually  transmitted infections)  Before age 24: Ask your care team if you should be screened for STIs.  After age 24: Get screened for STIs if you're at risk. You are at risk for STIs (including HIV) if:  You are sexually active with more than one person.  You don't use condoms every time.  You or a partner was diagnosed with a sexually transmitted infection.  If you are at risk for HIV, ask about PrEP medicine to prevent HIV.  Get tested for HIV at least once in your life, whether you are at risk for HIV or not.  Cancer screening tests  Cervical cancer screening: If you have a cervix, begin getting regular cervical cancer screening tests starting at age 21.  Breast cancer scan (mammogram): If you've ever had breasts, begin having regular mammograms starting at age 40. This is a scan to check for breast cancer.  Colon cancer screening: It is important to start screening for colon cancer at age 45.  Have a colonoscopy test every 10 years (or more often if you're at risk) Or, ask your provider about stool tests like a FIT test every year or Cologuard test every 3 years.  To learn more about your testing options, visit:   .  For help making a decision, visit:   https://bit.ly/vk51553.  Prostate cancer screening test: If you have a prostate, ask your care team if a prostate cancer screening test (PSA) at age 55 is right for you.  Lung cancer screening: If you are a current or former smoker ages 50 to 80, ask your care team if ongoing lung cancer screenings are right for you.  For informational purposes only. Not to replace the advice of your health care provider. Copyright   2023 [employer]. All rights reserved. Clinically reviewed by the [facility]itions [facility]. ELENZA [id number] - REV 01/24.

## 2025-01-15 NOTE — PROGRESS NOTES
Assessment & Plan     Jordan was seen today for urinary problem.    Diagnoses and all orders for this visit:    Benign prostatic hyperplasia with urinary frequency  Comments:  continue follow up with urologist for continue care , patient taking mirabegro BID by himself and feel that dose helping his nighttime frequency    Type 2 diabetes mellitus without complication, without long-term current use of insulin (H)  -     Lipid panel reflex to direct LDL Non-fasting; Future    Primary hypertension  -     BASIC METABOLIC PANEL; Future    Overactive bladder    Other orders  -     PRIMARY CARE FOLLOW-UP SCHEDULING; Future  -     INFLUENZA HIGH DOSE, TRIVALENT, PF (FLUZONE); Future  -     COVID-19 12+ (PFIZER); Future  -     REVIEW OF HEALTH MAINTENANCE PROTOCOL ORDERS       After long discussion I recommend that if he likes to continue mirabegron twice daily should be fine.  And discuss further treatment in [place] because he is going for 4-month in next 2 weeks and have the uroflowmetry done there and when you come back to [place] then have a in person appointment with the urologist to discuss his treatment options  He is aComing for Medicare preventive visit in couple weeks we can follow-up on that then as well    Subjective   Jordan is a 69 year old, presenting for the following health issues:  Urinary Problem (Discuss uroflowmetry test. Discuss prostate testing. )        1/15/2025    11:12 AM   Additional Questions   Roomed by Domonique TELLEZ MA     HPI   Patient is here to talk about his prostate and overactive bladder medication, he currently taking Proscar, silodosin and recently started on mirabegron 50 mg daily.  He talked to the specialist in [place] and started taking mirabegron twice daily so total 100 mg dose(max dose this medication is 50 mg which was prescribed by urologist) he feels twice daily dose is working very well for him and wondering if he can prescribe him twice daily dosing.  I did advise patient to  "reach out to urologist to increase the dose they might not agree to it but might prescribe twice daily dosing but as patient already leaving for [place] and he is a ex [employer] and get a free care in [place] as well he might continue twice daily doses of mirabegron and when he come back he can talk in person with the urologist since he is working well for me and he was also wondering if we can order a uroflowmetry which I again suggest that he talk in person with the urologist  Patient he was going to the bathroom 5-8 times at night now is only waking up twice so definitely mirabegron helped his symptoms and also the frequency in the daytime is improving and flow is getting better  He is not a candidate for surgery yet his prostate volume was only 24 mL review the most recent urologist note    Review of Systems  Constitutional, neuro, ENT, endocrine, pulmonary, cardiac, gastrointestinal, , musculoskeletal, integument and psychiatric systems are negative, except as otherwise noted.      Objective    /79   Pulse 77   Temp 97.7  F (36.5  C) (Oral)   Resp 12   Ht 1.676 m (5' 6\")   Wt 89.8 kg (197 lb 14.4 oz)   SpO2 98%   BMI 31.94 kg/m    Body mass index is 31.94 kg/m .  Physical Exam   GENERAL: alert and no distress  EYES: Eyes grossly normal to inspection, PERRL and conjunctivae and sclerae normal  MS: no gross musculoskeletal defects noted, no edema    Lab on 12/04/2024   Component Date Value Ref Range Status    Estimated Average Glucose 12/04/2024 143 (H)  <117 mg/dL Final    Hemoglobin A1C 12/04/2024 6.6 (H)  0.0 - 5.6 % Final    Normal <5.7%   Prediabetes 5.7-6.4%    Diabetes 6.5% or higher     Note: Adopted from ADA consensus guidelines.    Creatinine Urine mg/dL 12/04/2024 23.6  mg/dL Final    The reference ranges have not been established in urine creatinine. The results should be integrated into the clinical context for interpretation.    Albumin Urine mg/L 12/04/2024 <12.0  mg/L Final    The reference " ranges have not been established in urine albumin. The results should be integrated into the clinical context for interpretation.    Albumin Urine mg/g Cr 12/04/2024    Final    Unable to calculate, urine albumin and/or urine creatinine is outside detectable limits.  Microalbuminuria is defined as an albumin:creatinine ratio of 17 to 299 for males and 25 to 299 for females. A ratio of albumin:creatinine of 300 or higher is indicative of overt proteinuria.  Due to biologic variability, positive results should be confirmed by a second, first-morning random or 24-hour timed urine specimen. If there is discrepancy, a third specimen is recommended. When 2 out of 3 results are in the microalbuminuria range, this is evidence for incipient nephropathy and warrants increased efforts at glucose control, blood pressure control, and institution of therapy with an angiotensin-converting-enzyme (ACE) inhibitor (if the patient can tolerate it).             Signed Electronically by: Mira Han MD

## 2025-01-21 ENCOUNTER — OFFICE VISIT (OUTPATIENT)
Dept: FAMILY MEDICINE | Facility: CLINIC | Age: 70
End: 2025-01-21
Payer: COMMERCIAL

## 2025-01-21 VITALS
DIASTOLIC BLOOD PRESSURE: 62 MMHG | WEIGHT: 197 LBS | OXYGEN SATURATION: 100 % | RESPIRATION RATE: 12 BRPM | HEART RATE: 64 BPM | BODY MASS INDEX: 31.66 KG/M2 | TEMPERATURE: 97.8 F | HEIGHT: 66 IN | SYSTOLIC BLOOD PRESSURE: 118 MMHG

## 2025-01-21 DIAGNOSIS — Z79.899 MEDICATION MANAGEMENT: ICD-10-CM

## 2025-01-21 DIAGNOSIS — E11.9 TYPE 2 DIABETES MELLITUS WITHOUT COMPLICATION, WITHOUT LONG-TERM CURRENT USE OF INSULIN (H): ICD-10-CM

## 2025-01-21 DIAGNOSIS — I10 PRIMARY HYPERTENSION: ICD-10-CM

## 2025-01-21 DIAGNOSIS — Z00.01 ENCOUNTER FOR GENERAL ADULT MEDICAL EXAMINATION WITH ABNORMAL FINDINGS: Primary | ICD-10-CM

## 2025-01-21 LAB — HOLD SPECIMEN: NORMAL

## 2025-01-21 PROCEDURE — 99214 OFFICE O/P EST MOD 30 MIN: CPT | Mod: 25 | Performed by: FAMILY MEDICINE

## 2025-01-21 PROCEDURE — 36415 COLL VENOUS BLD VENIPUNCTURE: CPT | Performed by: FAMILY MEDICINE

## 2025-01-21 PROCEDURE — 80048 BASIC METABOLIC PNL TOTAL CA: CPT | Performed by: FAMILY MEDICINE

## 2025-01-21 PROCEDURE — G2211 COMPLEX E/M VISIT ADD ON: HCPCS | Performed by: FAMILY MEDICINE

## 2025-01-21 PROCEDURE — 80061 LIPID PANEL: CPT | Performed by: FAMILY MEDICINE

## 2025-01-21 PROCEDURE — G0439 PPPS, SUBSEQ VISIT: HCPCS | Performed by: FAMILY MEDICINE

## 2025-01-21 NOTE — PATIENT INSTRUCTIONS
Patient Education   Preventive Care Advice   This is general advice given by our system to help you stay healthy. However, your care team may have specific advice just for you. Please talk to your care team about your preventive care needs.  Nutrition  Eat 5 or more servings of fruits and vegetables each day.  Try wheat bread, brown rice and whole grain pasta (instead of white bread, rice, and pasta).  Get enough calcium and vitamin D. Check the label on foods and aim for 100% of the RDA (recommended daily allowance).  Lifestyle  Exercise at least 150 minutes each week  (30 minutes a day, 5 days a week).  Do muscle strengthening activities 2 days a week. These help control your weight and prevent disease.  No smoking.  Wear sunscreen to prevent skin cancer.  Have a dental exam and cleaning every 6 months.  Yearly exams  See your health care team every year to talk about:  Any changes in your health.  Any medicines your care team has prescribed.  Preventive care, family planning, and ways to prevent chronic diseases.  Shots (vaccines)   HPV shots (up to age 26), if you've never had them before.  Hepatitis B shots (up to age 59), if you've never had them before.  COVID-19 shot: Get this shot when it's due.  Flu shot: Get a flu shot every year.  Tetanus shot: Get a tetanus shot every 10 years.  Pneumococcal, hepatitis A, and RSV shots: Ask your care team if you need these based on your risk.  Shingles shot (for age 50 and up)  General health tests  Diabetes screening:  Starting at age 35, Get screened for diabetes at least every 3 years.  If you are younger than age 35, ask your care team if you should be screened for diabetes.  Cholesterol test: At age 39, start having a cholesterol test every 5 years, or more often if advised.  Bone density scan (DEXA): At age 50, ask your care team if you should have this scan for osteoporosis (brittle bones).  Hepatitis C: Get tested at least once in your life.  STIs (sexually  transmitted infections)  Before age 24: Ask your care team if you should be screened for STIs.  After age 24: Get screened for STIs if you're at risk. You are at risk for STIs (including HIV) if:  You are sexually active with more than one person.  You don't use condoms every time.  You or a partner was diagnosed with a sexually transmitted infection.  If you are at risk for HIV, ask about PrEP medicine to prevent HIV.  Get tested for HIV at least once in your life, whether you are at risk for HIV or not.  Cancer screening tests  Cervical cancer screening: If you have a cervix, begin getting regular cervical cancer screening tests starting at age 21.  Breast cancer scan (mammogram): If you've ever had breasts, begin having regular mammograms starting at age 40. This is a scan to check for breast cancer.  Colon cancer screening: It is important to start screening for colon cancer at age 45.  Have a colonoscopy test every 10 years (or more often if you're at risk) Or, ask your provider about stool tests like a FIT test every year or Cologuard test every 3 years.  To learn more about your testing options, visit:   .  For help making a decision, visit:   https://bit.ly/cl02394.  Prostate cancer screening test: If you have a prostate, ask your care team if a prostate cancer screening test (PSA) at age 55 is right for you.  Lung cancer screening: If you are a current or former smoker ages 50 to 80, ask your care team if ongoing lung cancer screenings are right for you.  For informational purposes only. Not to replace the advice of your health care provider. Copyright   2023 [employer]. All rights reserved. Clinically reviewed by the [facility]. [employer] 279433 - REV 01/24.

## 2025-01-21 NOTE — PROGRESS NOTES
Preventive Care Visit  [facility] BENJI Han MD, Family Medicine  Jan 21, 2025      Assessment & Plan     Jordan was seen today for medicare visit.    Diagnoses and all orders for this visit:    Encounter for general adult medical examination with abnormal findings    Medication management  Comments:  Yearly referral to med management done today for Medicare patient  Orders:  -     Med Therapy Management Referral    Type 2 diabetes mellitus without complication, without long-term current use of insulin (H)  Comments:  Continue metformin and Amaryl as before last A1c was 6.6 continue work on diet.  Patient is traveling to [place] for next 4 months  Orders:  -     Med Therapy Management Referral  -     Lipid panel reflex to direct LDL Non-fasting  -     Extra Purple Top EDTA (LAB USE ONLY)    Primary hypertension  Comments:  Blood pressure well-controlled  Orders:  -     Med Therapy Management Referral  -     BASIC METABOLIC PANEL    Other orders  -     PRIMARY CARE FOLLOW-UP SCHEDULING; Future         Patient has been advised of split billing requirements and indicates understanding: Yes        Counseling  Appropriate preventive services were addressed with this patient via screening, questionnaire, or discussion as appropriate for fall prevention, nutrition, physical activity, Tobacco-use cessation, social engagement, weight loss and cognition.  Checklist reviewing preventive services available has been given to the patient.  Reviewed patient's diet, addressing concerns and/or questions.   Updated plan of care.  Patient reported difficulty with activities of daily living were addressed today.        Subjective   Jordan is a 69 year old, presenting for the following:  Medicare Visit (AWV)        1/15/2025    11:12 AM   Additional Questions   Roomed by Domonique TELLEZ MA           HPI  Wt Readings from Last 4 Encounters:   01/21/25 89.4 kg (197 lb)   01/15/25 89.8 kg (197 lb 14.4 oz)   09/19/24 85.7 kg  (189 lb)   07/24/24 87.2 kg (192 lb 3 oz)    Diabetes Follow-up    How often are you checking your blood sugar? A few times a week  What time of day are you checking your blood sugars (select all that apply)?  Before meals  Have you had any blood sugars above 200?  No  Have you had any blood sugars below 70?  No  What symptoms do you notice when your blood sugar is low?  None  What concerns do you have today about your diabetes? None   Do you have any of these symptoms? (Select all that apply)  No numbness or tingling in feet.  No redness, sores or blisters on feet.  No complaints of excessive thirst.  No reports of blurry vision.  No significant changes to weight.  Have you had a diabetic eye exam in the last 12 months?  yes      Hyperlipidemia Follow-Up    Are you regularly taking any medication or supplement to lower your cholesterol?   Yes- crestor  Are you having muscle aches or other side effects that you think could be caused by your cholesterol lowering medication?  No    Hypertension Follow-up    Do you check your blood pressure regularly outside of the clinic? No   Are you following a low salt diet? Yes  Are your blood pressures ever more than 140 on the top number (systolic) OR more   than 90 on the bottom number (diastolic), for example 140/90? No    BP Readings from Last 2 Encounters:   01/21/25 118/62   01/15/25 128/79     Hemoglobin A1C (%)   Date Value   12/04/2024 6.6 (H)   08/22/2024 6.1 (H)   06/11/2021 6.4 (H)   04/23/2021 6.5 (H)     LDL Cholesterol Calculated (mg/dL)   Date Value   01/19/2024 100   11/28/2023 104 (H)   04/23/2021 106 (H)   07/03/2019 82       Health Care Directive  Patient does not have a Health Care Directive: Discussed advance care planning with patient; however, patient declined at this time.      1/19/2024   General Health   How would you rate your overall physical health? Good         1/19/2024   Nutrition   At least 4 servings of fruits and vegetables/day Yes          1/19/2024   Exercise   Frequency of exercise: 6-7 days/week         1/19/2024   Social Factors   Worry food won't last until get money to buy more No   Food not last or not have enough money for food? No   Do you have housing? (Housing is defined as stable permanent housing and does not include staying ouside in a car, in a tent, in an abandoned building, in an overnight shelter, or couch-surfing.) No   Are you worried about losing your housing? No   Lack of transportation? No   Unable to get utilities (heat,electricity)? No   Want help with housing or utility concern? No         1/19/2024   Activities of Daily Living- Home Safety   Needs help with the following daily activites NO assistance is needed   Safety concerns in the home None of the above          No data to display                  1/19/2024   Hearing Screening   Hearing concerns? No concerns            No data to display                       Today's PHQ-2 Score:       1/3/2025    10:15 AM   PHQ-2 ( 1999 Pfizer)   Q1: Little interest or pleasure in doing things 0   Q2: Feeling down, depressed or hopeless 0   PHQ-2 Score 0         1/19/2024   Substance Use   Alcohol more than 3/day or more than 7/wk No     Social History     Tobacco Use    Smoking status: Never     Passive exposure: Never    Smokeless tobacco: Never   Vaping Use    Vaping status: Never Used   Substance Use Topics    Alcohol use: Yes     Comment: 2-3 drinks a week hard liquor    Drug use: Never       Last PSA:   PSA   Date Value Ref Range Status   04/23/2021 0.50 0 - 4 ug/L Final     Comment:     Assay Method:  Chemiluminescence using Siemens Vista analyzer     Prostate Specific Antigen Screen   Date Value Ref Range Status   01/19/2024 0.98 0.00 - 4.50 ng/mL Final   05/20/2022 0.76 0.00 - 4.50 ug/L Final     ASCVD Risk   The 10-year ASCVD risk score (Magda ALBA, et al., 2019) is: 24.2%    Values used to calculate the score:      Age: 69 years      Sex: Male      Is Non-  : No      Diabetic: Yes      Tobacco smoker: No      Systolic Blood Pressure: 118 mmHg      Is BP treated: Yes      HDL Cholesterol: 62 mg/dL      Total Cholesterol: 175 mg/dL    Fracture Risk Assessment Tool  Link to Frax Calculator  Use the information below to complete the Frax calculator  : 1955  Sex: male  Weight (kg): 89.4 kg (actual weight)  Height (cm): 167.6 cm  Previous Fragility Fracture:  No  History of parent with fractured hip:  No  Current Smoking:  No  Patient has been on glucocorticoids for more than 3 months (5mg/day or more): No  Rheumatoid Arthritis on Problem List:  No  Secondary Osteoporosis on Problem List:  No  Consumes 3 or more units of alcohol per day: Yes  Femoral Neck BMD (g/cm2)            Reviewed and updated as needed this visit by Provider                      Current providers sharing in care for this patient include:  Patient Care Team:  Mira Han MD as PCP - General (Family Medicine)  Johanna Palencia RD as Diabetes Educator (Dietitian, Registered)  Mira Han MD as Assigned PCP  Johnnie Chavarria MD as MD (Cardiovascular Disease)  Jassi Zayas MD as Assigned Surgical Provider  Maricarmen Penny RN as Lead Care Coordinator (Primary Care - CC)  Brown Bradshaw MD as MD (Cardiovascular Disease)  Brown Bradshaw MD as Assigned Heart and Vascular Provider  Jassi Zayas MD as MD (Dermatology)  Marian Barajas as Lead Care Coordinator (Primary Care - CC)  Alysia Galeas PA-C as Physician Assistant    The following health maintenance items are reviewed in Epic and correct as of today:  Health Maintenance   Topic Date Due    EYE EXAM  2024    INFLUENZA VACCINE (1) 2024    COVID-19 Vaccine (2024- season) 2024    Medicare Annual MTM Pharmacist Visit (once per calendar year)  Never done    BMP  2025    LIPID  2025    A1C  2025    DIABETIC FOOT EXAM  2025    MICROALBUMIN  2025    ANNUAL REVIEW OF HM ORDERS  " 01/15/2026    MEDICARE ANNUAL WELLNESS VISIT  01/21/2026    FALL RISK ASSESSMENT  01/21/2026    ADVANCE CARE PLANNING  01/21/2030    COLORECTAL CANCER SCREENING  05/27/2032    PHQ-2 (once per calendar year)  Completed    Pneumococcal Vaccine: 50+ Years  Completed    RSV VACCINE  Completed    HPV IMMUNIZATION  Aged Out    MENINGITIS IMMUNIZATION  Aged Out    RSV MONOCLONAL ANTIBODY  Aged Out    HEPATITIS C SCREENING  Discontinued    ZOSTER IMMUNIZATION  Discontinued    DTAP/TDAP/TD IMMUNIZATION  Discontinued         Review of Systems  Constitutional, neuro, ENT, endocrine, pulmonary, cardiac, gastrointestinal, genitourinary, musculoskeletal, integument and psychiatric systems are negative, except as otherwise noted.     Objective    Exam  /62   Pulse 64   Temp 97.8  F (36.6  C) (Temporal)   Resp 12   Ht 1.676 m (5' 6\")   Wt 89.4 kg (197 lb)   SpO2 100%   BMI 31.80 kg/m     Estimated body mass index is 31.8 kg/m  as calculated from the following:    Height as of this encounter: 1.676 m (5' 6\").    Weight as of this encounter: 89.4 kg (197 lb).    Physical Exam  GENERAL: alert and no distress  NECK: no adenopathy, no asymmetry, masses, or scars  RESP: lungs clear to auscultation - no rales, rhonchi or wheezes  CV: regular rate and rhythm, normal S1 S2, no S3 or S4, no murmur, click or rub, no peripheral edema  ABDOMEN: soft, nontender, no hepatosplenomegaly, no masses and bowel sounds normal  MS: no gross musculoskeletal defects noted, no edema         1/21/2025   Mini Cog   Clock Draw Score 2 Normal   3 Item Recall 3 objects recalled   Mini Cog Total Score 5              Signed Electronically by: Mira Han MD    "

## 2025-01-22 ENCOUNTER — TELEPHONE (OUTPATIENT)
Dept: FAMILY MEDICINE | Facility: CLINIC | Age: 70
End: 2025-01-22
Payer: COMMERCIAL

## 2025-01-22 LAB
ANION GAP SERPL CALCULATED.3IONS-SCNC: 11 MMOL/L (ref 7–15)
BUN SERPL-MCNC: 16.8 MG/DL (ref 8–23)
CALCIUM SERPL-MCNC: 9.1 MG/DL (ref 8.8–10.4)
CHLORIDE SERPL-SCNC: 104 MMOL/L (ref 98–107)
CHOLEST SERPL-MCNC: 184 MG/DL
CREAT SERPL-MCNC: 1.04 MG/DL (ref 0.67–1.17)
EGFRCR SERPLBLD CKD-EPI 2021: 78 ML/MIN/1.73M2
FASTING STATUS PATIENT QL REPORTED: YES
FASTING STATUS PATIENT QL REPORTED: YES
GLUCOSE SERPL-MCNC: 137 MG/DL (ref 70–99)
HCO3 SERPL-SCNC: 23 MMOL/L (ref 22–29)
HDLC SERPL-MCNC: 64 MG/DL
LDLC SERPL CALC-MCNC: 107 MG/DL
NONHDLC SERPL-MCNC: 120 MG/DL
POTASSIUM SERPL-SCNC: 4.6 MMOL/L (ref 3.4–5.3)
SODIUM SERPL-SCNC: 138 MMOL/L (ref 135–145)
TRIGL SERPL-MCNC: 63 MG/DL

## 2025-01-22 NOTE — TELEPHONE ENCOUNTER
MTM referral from: [facility] visit (referral by provider)    MTM referral outreach attempt #1 on January 22, 2025 at 11:44 AM      Outcome: Patient is not interested at this time because pt declined     Use ucare part d  for the carrier/Plan on the flowsheet          Laura [facility]    [phone]

## 2025-01-27 ENCOUNTER — TRANSFERRED RECORDS (OUTPATIENT)
Dept: HEALTH INFORMATION MANAGEMENT | Facility: CLINIC | Age: 70
End: 2025-01-27
Payer: COMMERCIAL

## 2025-02-08 DIAGNOSIS — N13.8 BENIGN PROSTATIC HYPERPLASIA WITH URINARY OBSTRUCTION: ICD-10-CM

## 2025-02-08 DIAGNOSIS — N40.1 BENIGN PROSTATIC HYPERPLASIA WITH URINARY OBSTRUCTION: ICD-10-CM

## 2025-02-10 RX ORDER — TAMSULOSIN HYDROCHLORIDE 0.4 MG/1
0.4 CAPSULE ORAL 2 TIMES DAILY
Qty: 180 CAPSULE | Refills: 2 | Status: SHIPPED | OUTPATIENT
Start: 2025-02-10

## 2025-02-24 ENCOUNTER — PATIENT OUTREACH (OUTPATIENT)
Dept: GERIATRIC MEDICINE | Facility: CLINIC | Age: 70
End: 2025-02-24
Payer: COMMERCIAL

## 2025-02-24 NOTE — LETTER
February 26, 2025    JORDAN LOZANO  [address] [place] [address]    Dear Jordan:     I m your care coordinator. I ve been unable to reach you by phone. I am writing to ask you or your authorized representative to call me at [phone]. If you reach my voicemail, leave a message with your daytime phone number. Include a date and time that I can call you. If you are hearing impaired, call the [place] Relay at 647 or [phone] (xlcclf-qi-dhtuxe relay service).    The reason I am trying to reach you is:     [] To schedule an assessment  [x] For your six (6)-month check-in  [] Other:      Please call me as soon as you receive this letter. I look forward to speaking with you.    Sincerely,       Esteban Cerrato RN, BSN   [phone]  [email or web address]                                                                   <[id number]>    (08/2024)

## 2025-02-24 NOTE — PROGRESS NOTES
[facility] Care Coordination Contact    Message left on patient cell phone for 6 month assessment. Waiting for call back.    Esteban Cerrato RN  [facility]  [phone]

## 2025-02-26 NOTE — PROGRESS NOTES
"[facility] Care Coordination Contact    Per CC, mailed client an \"Unable to Contact\" letter.    Mar Garrett  Care Management Specialist  [facility]  [phone]      "

## 2025-03-09 ENCOUNTER — HEALTH MAINTENANCE LETTER (OUTPATIENT)
Age: 70
End: 2025-03-09

## 2025-03-12 ENCOUNTER — PATIENT OUTREACH (OUTPATIENT)
Dept: GERIATRIC MEDICINE | Facility: CLINIC | Age: 70
End: 2025-03-12
Payer: COMMERCIAL

## 2025-03-12 NOTE — PROGRESS NOTES
[facility] Care Coordination Contact      [facility] Six-Month Telephone Assessment    6 month telephone assessment completed on unable to reach.    ER visits: Unknown  Hospitalizations: Unkown  TCU stays: Unknown  Significant health status changes: Unknown  Falls/Injuries: Unknwon  ADL/IADL changes: No  Changes in services: No    Caregiver Assessment follow up:  N    Goals: See Support Plan for goal progress documentation.      Will see member in 6 months for an annual health risk assessment.   Encouraged member to call CC with any questions or concerns in the meantime.     Esteban Cerrato RN  [facility]  [phone]

## 2025-03-24 ENCOUNTER — PATIENT OUTREACH (OUTPATIENT)
Dept: GERIATRIC MEDICINE | Facility: CLINIC | Age: 70
End: 2025-03-24
Payer: COMMERCIAL

## 2025-03-24 NOTE — LETTER
March 24, 2025    JORDAN LOZANO  [address] [place] [address]      Dear Jordan:    As a member of [facility] ([facility]), you are provided a care coordinator. I will be your new care coordinator as of 4/1/2025. I will be calling you soon to see how you are doing and determine your needs.    If you have any questions, please feel free to call me at [phone]. If you reach my voice mail, please leave a message and your phone number. If you are hearing impaired, please call the [place] Relay at [phone] or [phone] (rvaono-aj-gxsgiv relay service).    I look forward to speaking with you soon.    Sincerely,       Esteban Cerrato RN, BSN   [phone]  [email or web address] is a health plan that contracts with both Medicare and the [place] Medical Assistance (Medicaid) program to provide benefits of both programs to enrollees. Enrollment in [facility] depends on contract renewal.      [facility]  [id number] DHS Approved ([id number])  [id number] (11/18)

## 2025-03-24 NOTE — PROGRESS NOTES
[facility] Care Coordination Contact    Internal CC change effective 4/1/2025.  Mailed member CC Change letter.  Additional tasks to be completed by CMS include: update database & EPIC, enter CC Change in MMIS, and move member file.    Lucita Blanton  Case Management Specialist   [facility]  [phone]

## 2025-06-04 ENCOUNTER — OFFICE VISIT (OUTPATIENT)
Dept: DERMATOLOGY | Facility: CLINIC | Age: 70
End: 2025-06-04
Payer: COMMERCIAL

## 2025-06-04 DIAGNOSIS — D22.9 MULTIPLE NEVI: ICD-10-CM

## 2025-06-04 DIAGNOSIS — B35.1 ONYCHOMYCOSIS: ICD-10-CM

## 2025-06-04 DIAGNOSIS — Z12.83 SKIN CANCER SCREENING: Primary | ICD-10-CM

## 2025-06-04 PROCEDURE — 99213 OFFICE O/P EST LOW 20 MIN: CPT | Performed by: STUDENT IN AN ORGANIZED HEALTH CARE EDUCATION/TRAINING PROGRAM

## 2025-06-04 NOTE — PATIENT INSTRUCTIONS
Patient Education       Proper skin care from [facility]:    -Eliminate harsh soaps as they strip the natural oils from the skin, often resulting in dry itchy skin ( i.e. Dial, Zest, [place] Spring)  -Use mild soaps such as Cetaphil or Dove Sensitive Skin in the shower. You do not need to use soap on arms, legs, and trunk every time you shower unless visibly soiled.   -Avoid hot or cold showers.  -After showering, lightly dry off and apply moisturizing within 2-3 minutes. This will help trap moisture in the skin.   -Aggressive use of a moisturizer at least 1-2 times a day to the entire body (including -Vanicream, Cetaphil, Aquaphor or Cerave) and moisturize hands after every washing.  -We recommend using moisturizers that come in a tub that needs to be scooped out, not a pump. This has more of an oil base. It will hold moisture in your skin much better than a water base moisturizer. The above recommended are non-pore clogging.      Wear a sunscreen with at least SPF 30 on your face, ears, neck and V of the chest daily. Wear sunscreen on other areas of the body if those areas are exposed to the sun throughout the day. Sunscreens can contain physical and/or chemical blockers. Physical blockers are less likely to clog pores, these include zinc oxide and titanium dioxide. Reapply every two hour and after swimming.     Sunscreen examples: https://www.ewg.org/sunscreen/    UV radiation  UVA radiation remains constant throughout the day and throughout the year. It is a longer wavelength than UVB and therefore penetrates deeper into the skin leading to immediate and delayed tanning, photoaging, and skin cancer. 70-80% of UVA and UVB radiation occurs between the hours of 10am-2pm.  UVB radiation  UVB radiation causes the most harmful effects and is more significant during the summer months. However, snow and ice can reflect UVB radiation leading to skin damage during the winter months as well. UVB radiation is  responsible for tanning, burning, inflammation, delayed erythema (pinkness), pigmentation (brown spots), and skin cancer.     I recommend self monthly full body exams and yearly full body exams with a dermatology provider. If you develop a new or changing lesion please follow up for examination. Most skin cancers are pink and scaly or pink and pearly. However, we do see blue/brown/black skin cancers.  Consider the ABCDEs of melanoma when giving yourself your monthly full body exam ( don't forget the groin, buttocks, feet, toes, etc). A-asymmetry, B-borders, C-color, D-diameter, E-elevation or evolving. If you see any of these changes please follow up in clinic. If you cannot see your back I recommend purchasing a hand held mirror to use with a larger wall mirror.       Checking for Skin Cancer  You can find cancer early by checking your skin each month. There are 3 kinds of skin cancer. They are melanoma, basal cell carcinoma, and squamous cell carcinoma. Doing monthly skin checks is the best way to find new marks or skin changes. Follow the instructions below for checking your skin.   The ABCDEs of checking moles for melanoma   Check your moles or growths for signs of melanoma using ABCDE:   Asymmetry: the sides of the mole or growth don t match  Border: the edges are ragged, notched, or blurred  Color: the color within the mole or growth varies  Diameter: the mole or growth is larger than 6 mm (size of a pencil eraser)  Evolving: the size, shape, or color of the mole or growth is changing (evolving is not shown in the images below)    Checking for other types of skin cancer  Basal cell carcinoma or squamous cell carcinoma have symptoms such as:     A spot or mole that looks different from all other marks on your skin  Changes in how an area feels, such as itching, tenderness, or pain  Changes in the skin's surface, such as oozing, bleeding, or scaliness  A sore that does not heal  New swelling or redness beyond  the border of a mole    Who s at risk?  Anyone can get skin cancer. But you are at greater risk if you have:   Fair skin, light-colored hair, or light-colored eyes  Many moles or abnormal moles on your skin  A history of sunburns from sunlight or tanning beds  A family history of skin cancer  A history of exposure to radiation or chemicals  A weakened immune system  If you have had skin cancer in the past, you are at risk for recurring skin cancer.   How to check your skin  Do your monthly skin checkups in front of a full-length mirror. Check all parts of your body, including your:   Head (ears, face, neck, and scalp)  Torso (front, back, and sides)  Arms (tops, undersides, upper, and lower armpits)  Hands (palms, backs, and fingers, including under the nails)  Buttocks and genitals  Legs (front, back, and sides)  Feet (tops, soles, toes, including under the nails, and between toes)  If you have a lot of moles, take digital photos of them each month. Make sure to take photos both up close and from a distance. These can help you see if any moles change over time.   Most skin changes are not cancer. But if you see any changes in your skin, call your doctor right away. Only he or she can diagnose a problem. If you have skin cancer, seeing your doctor can be the first step toward getting the treatment that could save your life.   [employer] last reviewed this educational content on 4/1/2019 2000-2020 [employer] [address] [place] [address], [place] [address]. All rights reserved. This information is not intended as a substitute for professional medical care. Always follow your healthcare professional's instructions.       When should I call my doctor?  If you are worsening or not improving, please, contact us or seek urgent care as noted below.     Who should I call with questions (adults)?  [facility] (adult and pediatric): [phone]  [facility]  [place] (adult): [phone]  [facility] ([place], [place], [place] and [place]) [phone]  For urgent needs outside of business hours call the [facility] at [phone] and ask for the dermatology resident on call to be paged  If this is a medical emergency and you are unable to reach an ER, Call 911      If you need a prescription refill, please contact your pharmacy. Refills are approved or denied by our Physicians during normal business hours, Monday through Fridays  Per office policy, refills will not be granted if you have not been seen within the past year (or sooner depending on your child's condition

## 2025-06-04 NOTE — PROGRESS NOTES
[facility] Dermatology Note  Encounter Date: Jun 4, 2025  Office Visit     Reviewed patients past medical history and pertinent chart review prior to patients visit today.     Dermatology Problem List:  Last skin check: 6/4/2025  # Onychomycosis, penlac, nail clipping pending  # Seborrheic dermatitis, ketoconazole shampoo, Lidex solution  # Verruca vulgaris, resolved  -s/p cryo, compounded cimetidine 5%/salicylic acid 15%/fluorouracil 5% adhesive peel  - Prior tx: aldara cream  # Intertrigo, hydrocortisone cream    Personal Hx: No personal history of skin cancer  Family Hx: Negative for family history of skin cancer.  _________________________________________    Assessment & Plan:     # Onychomycosis   -We discussed the difficulty associated with treating fungal infections of the nails. We discussed treatment with oral medications, nail lacquer, as well as the option to not treat.  Nail clipping obtained today by nursing staff. Pending results, oral terbinafine 250 mg daily for 12 weeks could be pursued.  - Continue Ciclopirox (Penlac) nightly to the affected nails.    # Verruca vulgaris, hands, resolved  -No warts present on exam.  Recommend continued observation.    # Multiple nevi, trunk and extremities  - Continued observation recommended.   - Nevi demonstrate no concerning features on dermoscopy. We discussed the importance of self exams at home.   - ABCDEs: Counseled ABCDEs of melanoma: Asymmetry, Border (irregularity), Color (not uniform, changes in color), Diameter (greater than 6 mm which is about the size of a pencil eraser), and Evolving (any changes in preexisting moles).  - Sun protection: Counseled SPF 30+ sunscreen, UPF clothing, sun avoidance, tanning bed avoidance.    Follow-up: 1 year(s) for follow up full body skin exam, prn for new or changing lesions or new concerns    All risks, benefits and alternatives were discussed with patient.  Patient is in agreement and understands  the assessment and plan.  All questions were answered.  Marissa White PA-C  [facility] Dermatology  ____________________________________________    CC: Skin cancer screening exam    HPI:  Mr. Jordan Miller is a(n) 70 year old male who presents today as a return patient for a full body skin cancer screening.  He was last seen in dermatology on 1/24/2025 at which time cryotherapy was performed for verruca vulgaris involving the hands.  He was also started on compounded cimetidine 5%/salicylic acid 15%/fluorouracil 5% adhesive peel.    Today, he states his warts have resolved on his hands.  His main concern today is he still continues to experience onychomycosis of the toenails.  He has been using the Penlac consistently with only minimal improvement.    Patient is otherwise feeling well, without additional skin concerns.     Physical Exam:  Vitals: There were no vitals taken for this visit.  SKIN: Total skin excluding the genitalia areas was performed. The exam included the head/face, neck, both arms, chest, back, abdomen, both legs, digits, mons pubis, buttock and nails.   -Crane 4  -left great toenail, right great toenail, few of the other toenails, nail thickening, yellow discoloration, and dystrophy   -multiple tan/brown flat round macules and raised papules scattered throughout trunk, extremities, and face. No worrisome features for malignancy noted on examination.    - No other lesions of concern on areas examined.     Medications:  Current Outpatient Medications   Medication Sig Dispense Refill    acetaminophen (TYLENOL) 650 MG CR tablet Take 1 tablet (650 mg) by mouth every 8 hours as needed for mild pain or fever. 90 tablet 0    acetaminophen (TYLENOL) 650 MG CR tablet Take 1 tablet (650 mg) by mouth every 8 hours as needed for mild pain or fever 90 tablet 3    alcohol swab prep pads Use to swab area of injection/loraine as directed. 100 each 3    Alcohol Swabs (ALCOHOL WIPES) 70 % PADS USE AS  DIRECTED TO TEST BLOOD SUGARS 100 each 3    AREXVY injection       blood glucose (NO BRAND SPECIFIED) test strip 1 strip by In Vitro route daily. 100 strip 1    blood glucose (NO BRAND SPECIFIED) test strip       blood glucose calibration (ONETOUCH VERIO) solution Use to calibrate blood glucose monitor as directed. 1 each 1    blood glucose monitoring (ONETOUCH VERIO IQ SYSTEM) meter device kit Use to test blood sugar 1 times daily. 1 kit 0    ciclopirox (PENLAC) 8 % external solution Apply to adjacent skin and affected nails daily.  Remove with alcohol every 7 days, then repeat. 6.6 mL 11    COMPOUNDED NON-CONTROLLED SUBSTANCE (CMPD RX) - PHARMACY TO MIX COMPOUNDED MEDICATION Cimetidine 5%/salicylic acid 15%/fluorouracil 5% adhesive peel sig apply to warts at bedtime 1 kit 11    cyanocobalamin (VITAMIN B-12) 1000 MCG tablet Take 1 tablet (1,000 mcg) by mouth every other day. 90 tablet 2    finasteride (PROSCAR) 5 MG tablet Take 1 tablet (5 mg) by mouth daily. 90 tablet 1    fluocinonide (LIDEX) 0.05 % external solution Apply topically 2 times daily. Apply twice daily to scalp for up to 2 weeks until resolution, thereafter use as needed for flares. 60 mL 1    glimepiride (AMARYL) 1 MG tablet TAKE 1 TABLET (1 MG) BY MOUTH EVERY MORNING (BEFORE BREAKFAST) 90 tablet 0    hydrocortisone 2.5 % cream Apply topically 2 times daily as needed for rash or itching. 30 g 1    imiquimod (ALDARA) 5 % external cream Apply nightly to warts, cover with band aid after application 24 packet 3    imiquimod (ALDARA) 5 % external cream Apply imiquimod to warts and cover with bandaid. Repeat nightly until resolved. Pause treatment if the area becomes very inflamed, resume when things are more calm 8 packet 4    ketoconazole (NIZORAL) 2 % external shampoo Wet affected area daily, apply shampoo and lather, let sit for 3-5 minutes and then rinse. 120 mL 11    lisinopril (ZESTRIL) 5 MG tablet TAKE 1 TABLET BY MOUTH EVERY DAY 90 tablet 2     metFORMIN (GLUCOPHAGE) 500 MG tablet TAKE 1 TABLET BY MOUTH DAILY WITH BREAKFAST 90 tablet 0    methyl salicylate-menthol (ICY HOT) ointment Apply topically every 6 hours as needed (arthirist pain) 99.2 g 1    mirabegron (MYRBETRIQ) 50 MG 24 hr tablet Take 1 tablet (50 mg) by mouth daily. 90 tablet 2    ONETOUCH VERIO IQ test strip USE TO TEST BLOOD SUGAR1 TIMES DAILY. 100 strip 0    pramoxine-menthol (SARNA CALM + COOL) 1-0.5 % external lotion Apply as needed for itchy skin on back. 222 mL 3    RESTASIS 0.05 % ophthalmic emulsion Place 1 drop into both eyes as needed for dry eyes. 5.5 mL 1    rosuvastatin (CRESTOR) 5 MG tablet Take 1 tablet (5 mg) by mouth daily (Patient taking differently: Take 5 mg by mouth daily. Once a week or twice a week) 90 tablet 0    Silodosin (RAPAFLO) 8 MG CAPS capsule Take 1 capsule (8 mg) by mouth daily. 90 capsule 3    tamsulosin (FLOMAX) 0.4 MG capsule TAKE 1 CAPSULE BY MOUTH TWICE A  capsule 2    vitamin D3 (CHOLECALCIFEROL) 50 mcg (2000 units) tablet Take 1 tablet (50 mcg) by mouth daily. 90 tablet 1     No current facility-administered medications for this visit.      Past Medical History:   Patient Active Problem List   Diagnosis    Type 2 diabetes mellitus (H)    Hypertensive disorder    Benign prostatic hyperplasia with urinary obstruction    Dyslipidemia    Primary osteoarthritis of both knees    Achilles tendinosis of left lower extremity    Cognitive impairment    Vitamin D insufficiency    Bilateral cataracts    Skin cyst    Increased frequency of urination    History of dislocation of elbow    High prostate specific antigen (PSA)    Overactive bladder     Past Medical History:   Diagnosis Date    BPH (benign prostatic hyperplasia)     Hyperlipidemia     Hypertension     Left ankle pain     Type 2 diabetes mellitus (H)        CC Referred Self, MD  No address on file on close of this encounter.

## 2025-06-04 NOTE — LETTER
6/4/2025      Jordan Miller  [address] [place] [address]      Dear Colleague,    Thank you for referring your patient, Jordan Miller, to the [facility]. Please see a copy of my visit note below.    [facility] Dermatology Note  Encounter Date: Jun 4, 2025  Office Visit     Reviewed patients past medical history and pertinent chart review prior to patients visit today.     Dermatology Problem List:  Last skin check: 6/4/2025  # Onychomycosis, penlac, nail clipping pending  # Seborrheic dermatitis, ketoconazole shampoo, Lidex solution  # Verruca vulgaris, resolved  -s/p cryo, compounded cimetidine 5%/salicylic acid 15%/fluorouracil 5% adhesive peel  - Prior tx: aldara cream  # Intertrigo, hydrocortisone cream    Personal Hx: No personal history of skin cancer  Family Hx: Negative for family history of skin cancer.  _________________________________________    Assessment & Plan:     # Onychomycosis   -We discussed the difficulty associated with treating fungal infections of the nails. We discussed treatment with oral medications, nail lacquer, as well as the option to not treat.  Nail clipping obtained today by nursing staff. Pending results, oral terbinafine 250 mg daily for 12 weeks could be pursued.  - Continue Ciclopirox (Penlac) nightly to the affected nails.    # Verruca vulgaris, hands, resolved  -No warts present on exam.  Recommend continued observation.    # Multiple nevi, trunk and extremities  - Continued observation recommended.   - Nevi demonstrate no concerning features on dermoscopy. We discussed the importance of self exams at home.   - ABCDEs: Counseled ABCDEs of melanoma: Asymmetry, Border (irregularity), Color (not uniform, changes in color), Diameter (greater than 6 mm which is about the size of a pencil eraser), and Evolving (any changes in preexisting moles).  - Sun protection: Counseled SPF 30+ sunscreen, UPF clothing, sun avoidance,  tanning bed avoidance.    Follow-up: 1 year(s) for follow up full body skin exam, prn for new or changing lesions or new concerns    All risks, benefits and alternatives were discussed with patient.  Patient is in agreement and understands the assessment and plan.  All questions were answered.  Marissa White PA-C  [facility] Dermatology  ____________________________________________    CC: Skin cancer screening exam    HPI:  Mr. Jordan Miller is a(n) 70 year old male who presents today as a return patient for a full body skin cancer screening.  He was last seen in dermatology on 1/24/2025 at which time cryotherapy was performed for verruca vulgaris involving the hands.  He was also started on compounded cimetidine 5%/salicylic acid 15%/fluorouracil 5% adhesive peel.    Today, he states his warts have resolved on his hands.  His main concern today is he still continues to experience onychomycosis of the toenails.  He has been using the Penlac consistently with only minimal improvement.    Patient is otherwise feeling well, without additional skin concerns.     Physical Exam:  Vitals: There were no vitals taken for this visit.  SKIN: Total skin excluding the genitalia areas was performed. The exam included the head/face, neck, both arms, chest, back, abdomen, both legs, digits, mons pubis, buttock and nails.   -Crane 4  -left great toenail, right great toenail, few of the other toenails, nail thickening, yellow discoloration, and dystrophy   -multiple tan/brown flat round macules and raised papules scattered throughout trunk, extremities, and face. No worrisome features for malignancy noted on examination.    - No other lesions of concern on areas examined.     Medications:  Current Outpatient Medications   Medication Sig Dispense Refill     acetaminophen (TYLENOL) 650 MG CR tablet Take 1 tablet (650 mg) by mouth every 8 hours as needed for mild pain or fever. 90 tablet 0     acetaminophen (TYLENOL) 650 MG  CR tablet Take 1 tablet (650 mg) by mouth every 8 hours as needed for mild pain or fever 90 tablet 3     alcohol swab prep pads Use to swab area of injection/loraine as directed. 100 each 3     Alcohol Swabs (ALCOHOL WIPES) 70 % PADS USE AS DIRECTED TO TEST BLOOD SUGARS 100 each 3     AREXVY injection        blood glucose (NO BRAND SPECIFIED) test strip 1 strip by In Vitro route daily. 100 strip 1     blood glucose (NO BRAND SPECIFIED) test strip        blood glucose calibration (ONETOUCH VERIO) solution Use to calibrate blood glucose monitor as directed. 1 each 1     blood glucose monitoring (ONETOUCH VERIO IQ SYSTEM) meter device kit Use to test blood sugar 1 times daily. 1 kit 0     ciclopirox (PENLAC) 8 % external solution Apply to adjacent skin and affected nails daily.  Remove with alcohol every 7 days, then repeat. 6.6 mL 11     COMPOUNDED NON-CONTROLLED SUBSTANCE (CMPD RX) - PHARMACY TO MIX COMPOUNDED MEDICATION Cimetidine 5%/salicylic acid 15%/fluorouracil 5% adhesive peel sig apply to warts at bedtime 1 kit 11     cyanocobalamin (VITAMIN B-12) 1000 MCG tablet Take 1 tablet (1,000 mcg) by mouth every other day. 90 tablet 2     finasteride (PROSCAR) 5 MG tablet Take 1 tablet (5 mg) by mouth daily. 90 tablet 1     fluocinonide (LIDEX) 0.05 % external solution Apply topically 2 times daily. Apply twice daily to scalp for up to 2 weeks until resolution, thereafter use as needed for flares. 60 mL 1     glimepiride (AMARYL) 1 MG tablet TAKE 1 TABLET (1 MG) BY MOUTH EVERY MORNING (BEFORE BREAKFAST) 90 tablet 0     hydrocortisone 2.5 % cream Apply topically 2 times daily as needed for rash or itching. 30 g 1     imiquimod (ALDARA) 5 % external cream Apply nightly to warts, cover with band aid after application 24 packet 3     imiquimod (ALDARA) 5 % external cream Apply imiquimod to warts and cover with bandaid. Repeat nightly until resolved. Pause treatment if the area becomes very inflamed, resume when things are  more calm 8 packet 4     ketoconazole (NIZORAL) 2 % external shampoo Wet affected area daily, apply shampoo and lather, let sit for 3-5 minutes and then rinse. 120 mL 11     lisinopril (ZESTRIL) 5 MG tablet TAKE 1 TABLET BY MOUTH EVERY DAY 90 tablet 2     metFORMIN (GLUCOPHAGE) 500 MG tablet TAKE 1 TABLET BY MOUTH DAILY WITH BREAKFAST 90 tablet 0     methyl salicylate-menthol (ICY HOT) ointment Apply topically every 6 hours as needed (arthirist pain) 99.2 g 1     mirabegron (MYRBETRIQ) 50 MG 24 hr tablet Take 1 tablet (50 mg) by mouth daily. 90 tablet 2     ONETOUCH VERIO IQ test strip USE TO TEST BLOOD SUGAR1 TIMES DAILY. 100 strip 0     pramoxine-menthol (SARNA CALM + COOL) 1-0.5 % external lotion Apply as needed for itchy skin on back. 222 mL 3     RESTASIS 0.05 % ophthalmic emulsion Place 1 drop into both eyes as needed for dry eyes. 5.5 mL 1     rosuvastatin (CRESTOR) 5 MG tablet Take 1 tablet (5 mg) by mouth daily (Patient taking differently: Take 5 mg by mouth daily. Once a week or twice a week) 90 tablet 0     Silodosin (RAPAFLO) 8 MG CAPS capsule Take 1 capsule (8 mg) by mouth daily. 90 capsule 3     tamsulosin (FLOMAX) 0.4 MG capsule TAKE 1 CAPSULE BY MOUTH TWICE A  capsule 2     vitamin D3 (CHOLECALCIFEROL) 50 mcg (2000 units) tablet Take 1 tablet (50 mcg) by mouth daily. 90 tablet 1     No current facility-administered medications for this visit.      Past Medical History:   Patient Active Problem List   Diagnosis     Type 2 diabetes mellitus (H)     Hypertensive disorder     Benign prostatic hyperplasia with urinary obstruction     Dyslipidemia     Primary osteoarthritis of both knees     Achilles tendinosis of left lower extremity     Cognitive impairment     Vitamin D insufficiency     Bilateral cataracts     Skin cyst     Increased frequency of urination     History of dislocation of elbow     High prostate specific antigen (PSA)     Overactive bladder     Past Medical History:   Diagnosis  Date     BPH (benign prostatic hyperplasia)      Hyperlipidemia      Hypertension      Left ankle pain      Type 2 diabetes mellitus (H)        CC Referred Self, MD  No address on file on close of this encounter.    Again, thank you for allowing me to participate in the care of your patient.        Sincerely,        Alysia White PA-C    Electronically signed

## 2025-06-05 ENCOUNTER — RESULTS FOLLOW-UP (OUTPATIENT)
Dept: FAMILY MEDICINE | Facility: CLINIC | Age: 70
End: 2025-06-05

## 2025-06-05 ENCOUNTER — LAB (OUTPATIENT)
Dept: LAB | Facility: CLINIC | Age: 70
End: 2025-06-05
Payer: COMMERCIAL

## 2025-06-05 DIAGNOSIS — E11.9 TYPE 2 DIABETES MELLITUS (H): Primary | ICD-10-CM

## 2025-06-05 LAB
EST. AVERAGE GLUCOSE BLD GHB EST-MCNC: 143 MG/DL
HBA1C MFR BLD: 6.6 % (ref 0–5.6)

## 2025-06-09 LAB
PATH REPORT.COMMENTS IMP SPEC: NORMAL
PATH REPORT.FINAL DX SPEC: NORMAL
PATH REPORT.GROSS SPEC: NORMAL
PATH REPORT.MICROSCOPIC SPEC OTHER STN: NORMAL
PATH REPORT.RELEVANT HX SPEC: NORMAL

## 2025-06-10 ENCOUNTER — TRANSFERRED RECORDS (OUTPATIENT)
Dept: HEALTH INFORMATION MANAGEMENT | Facility: CLINIC | Age: 70
End: 2025-06-10
Payer: COMMERCIAL

## 2025-06-13 ENCOUNTER — RESULTS FOLLOW-UP (OUTPATIENT)
Dept: DERMATOLOGY | Facility: CLINIC | Age: 70
End: 2025-06-13

## 2025-06-30 ENCOUNTER — TELEPHONE (OUTPATIENT)
Dept: FAMILY MEDICINE | Facility: CLINIC | Age: 70
End: 2025-06-30
Payer: COMMERCIAL

## 2025-06-30 DIAGNOSIS — Z12.11 COLON CANCER SCREENING: Primary | ICD-10-CM

## 2025-06-30 NOTE — TELEPHONE ENCOUNTER
Contacts       Contact Date/Time Type Contact Phone/Fax    06/30/2025 08:49 AM CDT In Person (Incoming) Jordan Miller (Self)           Attempted to reach patient to: Relay a message    Regarding: Colon Cancer Screening    Action to take: OK to relay (verbatim):   Patient had done Cologuard in the past and is due for another Cologuard testing.  But I did put the request for colonoscopy if he wants to do that     Mira Hna MD

## 2025-06-30 NOTE — TELEPHONE ENCOUNTER
06/30/25    Patient stopped in for Reward from [facility] for Colon cancer screening, he would like to know if he needs a referral or does he need to make an appointment. He says you can reach out to him Via [email or web address]    Thank you

## 2025-06-30 NOTE — TELEPHONE ENCOUNTER
Patient had done Cologuard in the past and is due for another Cologuard testing.  But I did put the request for colonoscopy if he wants to do that    Mira Han MD

## 2025-07-01 NOTE — TELEPHONE ENCOUNTER
Contacts       Contact Date/Time Type Contact Phone/Fax    06/30/2025 08:49 AM CDT In Person (Incoming) Jordan Miller (Self)     06/30/2025 09:54 AM CDT Phone (Outgoing) Jordan Miller (Self) [phone] (M)    Left Message           Attempted to reach patient to: Relay a message    Regarding: Colon Cancer Screening    Action to take: OK to relay (verbatim): Patient had done Cologuard in the past and is due for another Cologuard testing.  But I did put the request for colonoscopy if he wants to do that     Mira Han MD

## 2025-08-11 ENCOUNTER — PATIENT OUTREACH (OUTPATIENT)
Dept: GERIATRIC MEDICINE | Facility: CLINIC | Age: 70
End: 2025-08-11
Payer: COMMERCIAL

## 2025-08-12 ENCOUNTER — TELEPHONE (OUTPATIENT)
Dept: FAMILY MEDICINE | Facility: CLINIC | Age: 70
End: 2025-08-12
Payer: COMMERCIAL

## 2025-08-12 DIAGNOSIS — E11.9 TYPE 2 DIABETES MELLITUS WITHOUT COMPLICATION, WITHOUT LONG-TERM CURRENT USE OF INSULIN (H): ICD-10-CM

## 2025-08-13 DIAGNOSIS — E11.9 TYPE 2 DIABETES MELLITUS WITHOUT COMPLICATION, WITHOUT LONG-TERM CURRENT USE OF INSULIN (H): ICD-10-CM

## 2025-08-13 RX ORDER — BLOOD SUGAR DIAGNOSTIC
STRIP MISCELLANEOUS
Qty: 100 STRIP | Refills: 0 | Status: SHIPPED | OUTPATIENT
Start: 2025-08-13

## 2025-08-15 ENCOUNTER — PATIENT OUTREACH (OUTPATIENT)
Dept: GERIATRIC MEDICINE | Facility: CLINIC | Age: 70
End: 2025-08-15
Payer: COMMERCIAL

## 2025-08-18 ASSESSMENT — ACTIVITIES OF DAILY LIVING (ADL): DEPENDENT_IADLS:: INDEPENDENT

## 2025-08-19 ENCOUNTER — PATIENT OUTREACH (OUTPATIENT)
Dept: GERIATRIC MEDICINE | Facility: CLINIC | Age: 70
End: 2025-08-19
Payer: COMMERCIAL

## 2025-08-28 ENCOUNTER — OFFICE VISIT (OUTPATIENT)
Dept: CARDIOLOGY | Facility: CLINIC | Age: 70
End: 2025-08-28
Payer: COMMERCIAL

## 2025-08-28 VITALS
BODY MASS INDEX: 31.2 KG/M2 | RESPIRATION RATE: 16 BRPM | OXYGEN SATURATION: 98 % | SYSTOLIC BLOOD PRESSURE: 130 MMHG | HEART RATE: 62 BPM | WEIGHT: 193.3 LBS | DIASTOLIC BLOOD PRESSURE: 60 MMHG

## 2025-08-28 DIAGNOSIS — E11.9 TYPE 2 DIABETES MELLITUS WITHOUT COMPLICATION, WITHOUT LONG-TERM CURRENT USE OF INSULIN (H): ICD-10-CM

## 2025-08-28 DIAGNOSIS — I10 HYPERTENSION, UNSPECIFIED TYPE: ICD-10-CM

## 2025-08-28 DIAGNOSIS — I10 ESSENTIAL HYPERTENSION: ICD-10-CM
